# Patient Record
Sex: MALE | Race: WHITE | NOT HISPANIC OR LATINO | URBAN - METROPOLITAN AREA
[De-identification: names, ages, dates, MRNs, and addresses within clinical notes are randomized per-mention and may not be internally consistent; named-entity substitution may affect disease eponyms.]

---

## 2017-01-10 ENCOUNTER — HOSPITAL ENCOUNTER (OUTPATIENT)
Dept: LAB | Facility: MEDICAL CENTER | Age: 62
End: 2017-01-10
Payer: COMMERCIAL

## 2017-01-10 DIAGNOSIS — Z51.81 ENCOUNTER FOR THERAPEUTIC DRUG MONITORING: ICD-10-CM

## 2017-01-10 DIAGNOSIS — I25.10 CORONARY ARTERY DISEASE INVOLVING NATIVE CORONARY ARTERY OF NATIVE HEART WITHOUT ANGINA PECTORIS: ICD-10-CM

## 2017-01-10 DIAGNOSIS — E78.2 MIXED HYPERLIPIDEMIA: ICD-10-CM

## 2017-01-10 LAB
ALBUMIN SERPL BCP-MCNC: 4.5 G/DL (ref 3.2–4.9)
ALBUMIN/GLOB SERPL: 1.9 G/DL
ALP SERPL-CCNC: 50 U/L (ref 30–99)
ALT SERPL-CCNC: 34 U/L (ref 2–50)
ANION GAP SERPL CALC-SCNC: 5 MMOL/L (ref 0–11.9)
AST SERPL-CCNC: 34 U/L (ref 12–45)
BILIRUB SERPL-MCNC: 1.6 MG/DL (ref 0.1–1.5)
BUN SERPL-MCNC: 17 MG/DL (ref 8–22)
CALCIUM SERPL-MCNC: 9.5 MG/DL (ref 8.4–10.2)
CHLORIDE SERPL-SCNC: 108 MMOL/L (ref 96–112)
CHOLEST SERPL-MCNC: 108 MG/DL (ref 100–199)
CO2 SERPL-SCNC: 29 MMOL/L (ref 20–33)
CREAT SERPL-MCNC: 0.88 MG/DL (ref 0.5–1.4)
GFR SERPL CREATININE-BSD FRML MDRD: >60 ML/MIN/1.73 M 2
GLOBULIN SER CALC-MCNC: 2.4 G/DL (ref 1.9–3.5)
GLUCOSE SERPL-MCNC: 98 MG/DL (ref 65–99)
HDLC SERPL-MCNC: 45 MG/DL
LDLC SERPL CALC-MCNC: 28 MG/DL
POTASSIUM SERPL-SCNC: 4.7 MMOL/L (ref 3.6–5.5)
PROT SERPL-MCNC: 6.9 G/DL (ref 6–8.2)
SODIUM SERPL-SCNC: 142 MMOL/L (ref 135–145)
TRIGL SERPL-MCNC: 175 MG/DL (ref 0–149)

## 2017-01-10 PROCEDURE — 80061 LIPID PANEL: CPT

## 2017-01-10 PROCEDURE — 36415 COLL VENOUS BLD VENIPUNCTURE: CPT

## 2017-01-10 PROCEDURE — 80053 COMPREHEN METABOLIC PANEL: CPT

## 2017-01-12 ENCOUNTER — OFFICE VISIT (OUTPATIENT)
Dept: CARDIOLOGY | Facility: MEDICAL CENTER | Age: 62
End: 2017-01-12
Payer: COMMERCIAL

## 2017-01-12 VITALS
HEIGHT: 68 IN | HEART RATE: 60 BPM | WEIGHT: 188 LBS | DIASTOLIC BLOOD PRESSURE: 82 MMHG | OXYGEN SATURATION: 94 % | BODY MASS INDEX: 28.49 KG/M2 | SYSTOLIC BLOOD PRESSURE: 116 MMHG

## 2017-01-12 DIAGNOSIS — R00.2 PALPITATIONS: ICD-10-CM

## 2017-01-12 DIAGNOSIS — I25.10 CORONARY ARTERY DISEASE INVOLVING NATIVE CORONARY ARTERY OF NATIVE HEART WITHOUT ANGINA PECTORIS: ICD-10-CM

## 2017-01-12 DIAGNOSIS — E78.5 HYPERLIPIDEMIA, UNSPECIFIED HYPERLIPIDEMIA TYPE: ICD-10-CM

## 2017-01-12 DIAGNOSIS — I49.3 PREMATURE VENTRICULAR CONTRACTIONS: ICD-10-CM

## 2017-01-12 DIAGNOSIS — R00.2 HEART PALPITATIONS: ICD-10-CM

## 2017-01-12 DIAGNOSIS — E78.2 MIXED HYPERLIPIDEMIA: ICD-10-CM

## 2017-01-12 PROCEDURE — 99214 OFFICE O/P EST MOD 30 MIN: CPT | Performed by: NURSE PRACTITIONER

## 2017-01-12 RX ORDER — CLOPIDOGREL BISULFATE 75 MG/1
75 TABLET ORAL DAILY
Qty: 90 TAB | Refills: 3 | Status: SHIPPED | OUTPATIENT
Start: 2017-01-12 | End: 2018-01-19

## 2017-01-12 RX ORDER — ROSUVASTATIN CALCIUM 40 MG/1
40 TABLET, COATED ORAL
Qty: 90 TAB | Refills: 3 | Status: SHIPPED | OUTPATIENT
Start: 2017-01-12 | End: 2018-01-19 | Stop reason: SDUPTHER

## 2017-01-12 RX ORDER — EZETIMIBE 10 MG/1
10 TABLET ORAL
Qty: 90 TAB | Refills: 3 | Status: SHIPPED | OUTPATIENT
Start: 2017-01-12 | End: 2018-01-19 | Stop reason: SDUPTHER

## 2017-01-12 RX ORDER — METOPROLOL SUCCINATE 25 MG/1
25 TABLET, EXTENDED RELEASE ORAL DAILY
Qty: 90 TAB | Refills: 3 | Status: SHIPPED | OUTPATIENT
Start: 2017-01-12 | End: 2018-01-19 | Stop reason: SDUPTHER

## 2017-01-12 ASSESSMENT — ENCOUNTER SYMPTOMS
CLAUDICATION: 0
ABDOMINAL PAIN: 0
MYALGIAS: 0
SHORTNESS OF BREATH: 0
PND: 0
DIZZINESS: 0
PALPITATIONS: 0
ORTHOPNEA: 0
FEVER: 0
COUGH: 0

## 2017-01-12 NOTE — MR AVS SNAPSHOT
"        Sarabjit Almendarez   2017 9:20 AM   Office Visit   MRN: [id number]    Department:  [facility]   Dept Phone:  [phone]    Description:  Male : 1955   Provider:  SHARON Villafuerte           Reason for Visit     Follow-Up           Allergies as of 2017     No Known Allergies      You were diagnosed with     Coronary artery disease involving native coronary artery of native heart without angina pectoris   [5025778]       Mixed hyperlipidemia   [272.2.ICD-9-CM]       Heart palpitations   [290515]       Premature ventricular contractions   [928940]       Palpitations   [785.1.ICD-9-CM]       Hyperlipidemia, unspecified hyperlipidemia type   [9053933]         Vital Signs     Blood Pressure Pulse Height Weight Body Mass Index Oxygen Saturation    116/82 mmHg 60 1.727 m (5' 8\") 85.276 kg (188 lb) 28.59 kg/m2 94%    Smoking Status                   Never Smoker            Basic Information     Date Of Birth Sex Race Ethnicity Preferred Language    1955 Male White Non- English      Problem List              ICD-10-CM Priority Class Noted - Resolved    Coronary artery disease I25.10 Medium  Unknown - Present    Hyperlipidemia E78.5 Medium  Unknown - Present    Heart palpitations R00.2 Medium  Unknown - Present    Premature ventricular contractions I49.3 Medium  Unknown - Present      Health Maintenance        Date Due Completion Dates    IMM DTaP/Tdap/Td Vaccine (1 - Tdap) 1973 ---    COLONOSCOPY 2004 ---    IMM ZOSTER VACCINE 2014 ---    IMM INFLUENZA (1) 2016 ---            Current Immunizations     No immunizations on file.      Below and/or attached are the medications your provider expects you to take. Review all of your home medications and newly ordered medications with your provider and/or pharmacist. Follow medication instructions as directed by your provider and/or pharmacist. Please keep your medication list with you and share with " your provider. Update the information when medications are discontinued, doses are changed, or new medications (including over-the-counter products) are added; and carry medication information at all times in the event of emergency situations     Allergies:  No Known Allergies          Medications  Valid as of: January 12, 2017 - 10:04 AM    Generic Name Brand Name Tablet Size Instructions for use    Aspirin (Tablet Delayed Response) ECOTRIN 81 MG Take 81 mg by mouth every day.        Clopidogrel Bisulfate (Tab) PLAVIX 75 MG Take 1 Tab by mouth every day.        Ezetimibe (Tab) ZETIA 10 MG Take 1 Tab by mouth every bedtime.        Metoprolol Succinate (TABLET SR 24 HR) TOPROL XL 25 MG Take 1 Tab by mouth every day.        Rosuvastatin Calcium (Tab) CRESTOR 40 MG Take 1 Tab by mouth every bedtime.        .                 Medicines prescribed today were sent to:     [facility] [address] [place] [address], [place] - [id number] [facility] [place] [address]    Phone: [phone] Fax: [phone]    Open 24 Hours?: No      Medication refill instructions:       If your prescription bottle indicates you have medication refills left, it is not necessary to call your provider’s office. Please contact your pharmacy and they will refill your medication.    If your prescription bottle indicates you do not have any refills left, you may request refills at any time through one of the following ways: The online Xoopit system (except Urgent Care), by calling your provider’s office, or by asking your pharmacy to contact your provider’s office with a refill request. Medication refills are processed only during regular business hours and may not be available until the next business day. Your provider may request additional information or to have a follow-up visit with you prior to refilling your medication.   *Please Note: Medication refills are assigned a new Rx number when refilled electronically. Your pharmacy may indicate that  no refills were authorized even though a new prescription for the same medication is available at the pharmacy. Please request the medicine by name with the pharmacy before contacting your provider for a refill.           MyRealTriphart Access Code: Activation code not generated  Current Pickiet Status: Active

## 2017-01-12 NOTE — Clinical Note
[facility]   [address]  [place] [address]  Phone: [phone]  Fax: [phone]              Sarabjit Almendarez  1955    Encounter Date: 1/12/2017    SHARON Villafuerte          PROGRESS NOTE:  Subjective:   Sarabjit Almendarez is a 61 y.o. male who presents today for yearly follow up.    He is a patient of Dr. Treadwell in our office. Hx of HLD, HTN, PVC's, and CAD with previous CABG in '05.    He continues to exercise daily with no problems. He does the elliptical for 40-50 minutes daily.    He has had no episodes of chest pain, palpitations, dizziness/lightheadedness, shortness of breath, orthopnea, or peripheral edema.    Past Medical History   Diagnosis Date   • Hyperlipidemia    • CAD (coronary artery disease)      CABG    • Hypertension    • Palpitations      PVC's     Past Surgical History   Procedure Laterality Date   • Multiple coronary artery bypass     • Cardiac cath       Family History   Problem Relation Age of Onset   • Heart Disease Other      History   Smoking status   • Never Smoker    Smokeless tobacco   • Never Used     No Known Allergies  Outpatient Encounter Prescriptions as of 1/12/2017   Medication Sig Dispense Refill   • ezetimibe (ZETIA) 10 MG Tab Take 1 Tab by mouth every bedtime. 90 Tab 3   • metoprolol SR (TOPROL XL) 25 MG TABLET SR 24 HR Take 1 Tab by mouth every day. 90 Tab 3   • rosuvastatin (CRESTOR) 40 MG tablet Take 1 Tab by mouth every bedtime. 90 Tab 3   • clopidogrel (PLAVIX) 75 MG Tab Take 1 Tab by mouth every day. 90 Tab 3   • [DISCONTINUED] clopidogrel (PLAVIX) 75 MG Tab Take 1 Tab by mouth every day. 90 Tab 3   • [DISCONTINUED] ezetimibe (ZETIA) 10 MG Tab Take 1 Tab by mouth every bedtime. 90 Tab 3   • [DISCONTINUED] metoprolol SR (TOPROL XL) 25 MG TABLET SR 24 HR Take 1 Tab by mouth every day. 90 Tab 3   • [DISCONTINUED] rosuvastatin (CRESTOR) 40 MG tablet Take 1 Tab by mouth every bedtime. 90  "Tab 3   • aspirin EC (ECOTRIN) 81 MG TBEC Take 81 mg by mouth every day.       No facility-administered encounter medications on file as of 1/12/2017.     Review of Systems   Constitutional: Negative for fever and malaise/fatigue.   Respiratory: Negative for cough and shortness of breath.    Cardiovascular: Negative for chest pain, palpitations, orthopnea, claudication, leg swelling and PND.   Gastrointestinal: Negative for abdominal pain.   Musculoskeletal: Negative for myalgias.   Neurological: Negative for dizziness.   All other systems reviewed and are negative.       Objective:   /82 mmHg  Pulse 60  Ht 1.727 m (5' 8\")  Wt 85.276 kg (188 lb)  BMI 28.59 kg/m2  SpO2 94%    Physical Exam   Constitutional: He is oriented to person, place, and time. He appears well-developed and well-nourished. No distress.   HENT:   Head: Normocephalic and atraumatic.   Eyes: EOM are normal.   Neck: Normal range of motion. No JVD present.   Cardiovascular: Normal rate, regular rhythm, normal heart sounds and intact distal pulses.    No murmur heard.  Pulmonary/Chest: Effort normal and breath sounds normal. No respiratory distress. He has no wheezes. He has no rales.   Abdominal: Soft. Bowel sounds are normal.   Musculoskeletal: Normal range of motion. He exhibits no edema.   Neurological: He is alert and oriented to person, place, and time.   Skin: Skin is warm and dry.   Psychiatric: He has a normal mood and affect.   Nursing note and vitals reviewed.    Lab Results   Component Value Date/Time    CHOLESTEROL, 01/10/2017 10:06 AM    LDL 28 01/10/2017 10:06 AM    HDL 45 01/10/2017 10:06 AM    TRIGLYCERIDES 175* 01/10/2017 10:06 AM       Lab Results   Component Value Date/Time    SODIUM 142 01/10/2017 10:06 AM    POTASSIUM 4.7 01/10/2017 10:06 AM    CHLORIDE 108 01/10/2017 10:06 AM    CO2 29 01/10/2017 10:06 AM    GLUCOSE 98 01/10/2017 10:06 AM    BUN 17 01/10/2017 10:06 AM    CREATININE 0.88 01/10/2017 10:06 AM    " BUN-CREATININE RATIO 16 01/21/2015 08:52 AM     Lab Results   Component Value Date/Time    ALKALINE PHOSPHATASE 50 01/10/2017 10:06 AM    AST(SGOT) 34 01/10/2017 10:06 AM    ALT(SGPT) 34 01/10/2017 10:06 AM    TOTAL BILIRUBIN 1.6* 01/10/2017 10:06 AM      Assessment:     1. Coronary artery disease involving native coronary artery of native heart without angina pectoris  ezetimibe (ZETIA) 10 MG Tab    metoprolol SR (TOPROL XL) 25 MG TABLET SR 24 HR    rosuvastatin (CRESTOR) 40 MG tablet    clopidogrel (PLAVIX) 75 MG Tab   2. Mixed hyperlipidemia     3. Heart palpitations  ezetimibe (ZETIA) 10 MG Tab    metoprolol SR (TOPROL XL) 25 MG TABLET SR 24 HR    rosuvastatin (CRESTOR) 40 MG tablet    clopidogrel (PLAVIX) 75 MG Tab   4. Premature ventricular contractions     5. Palpitations  ezetimibe (ZETIA) 10 MG Tab    metoprolol SR (TOPROL XL) 25 MG TABLET SR 24 HR    rosuvastatin (CRESTOR) 40 MG tablet    clopidogrel (PLAVIX) 75 MG Tab   6. Hyperlipidemia, unspecified hyperlipidemia type  ezetimibe (ZETIA) 10 MG Tab    metoprolol SR (TOPROL XL) 25 MG TABLET SR 24 HR    rosuvastatin (CRESTOR) 40 MG tablet    clopidogrel (PLAVIX) 75 MG Tab     Medical Decision Making:  Today's Assessment / Status / Plan:     1. CAD with previous CABG in '05, no angina or ZARAGOZA. Continue ASA, plavix (consider discontinuation at next apt), metoprolol, and statin.    2. HLD, LDL at goal <70. Triglycerides a little elevated, recommend cutting back on sugars. Continue crestor and zetia.    3. Palpitations with PVC's, rare and improved with metoprolol.    4. HTN, great control on metoprolol only.    FU in clinic in 12 months with ST with yearly CMP and lipid; re-filled all med's for one year    Patient verbalizes understanding and agrees with the plan of care.     Collaborating MD: Addison SUAREZ    Spent 45 minutes in face-to-face patient contact in which greater than 50% of the visit was spent in counseling/coordination of care of medication  management, symptom management, re-assurance, discussion of coronary disease, medications and future plan of care.        No Recipients

## 2017-01-12 NOTE — PROGRESS NOTES
Subjective:   Sarabjit Almendarez is a 61 y.o. male who presents today for yearly follow up.    He is a patient of Dr. Treadwell in our office. Hx of HLD, HTN, PVC's, and CAD with previous CABG in '05.    He continues to exercise daily with no problems. He does the elliptical for 40-50 minutes daily.    He has had no episodes of chest pain, palpitations, dizziness/lightheadedness, shortness of breath, orthopnea, or peripheral edema.    Past Medical History   Diagnosis Date   • Hyperlipidemia    • CAD (coronary artery disease)      CABG    • Hypertension    • Palpitations      PVC's     Past Surgical History   Procedure Laterality Date   • Multiple coronary artery bypass     • Cardiac cath       Family History   Problem Relation Age of Onset   • Heart Disease Other      History   Smoking status   • Never Smoker    Smokeless tobacco   • Never Used     No Known Allergies  Outpatient Encounter Prescriptions as of 1/12/2017   Medication Sig Dispense Refill   • ezetimibe (ZETIA) 10 MG Tab Take 1 Tab by mouth every bedtime. 90 Tab 3   • metoprolol SR (TOPROL XL) 25 MG TABLET SR 24 HR Take 1 Tab by mouth every day. 90 Tab 3   • rosuvastatin (CRESTOR) 40 MG tablet Take 1 Tab by mouth every bedtime. 90 Tab 3   • clopidogrel (PLAVIX) 75 MG Tab Take 1 Tab by mouth every day. 90 Tab 3   • [DISCONTINUED] clopidogrel (PLAVIX) 75 MG Tab Take 1 Tab by mouth every day. 90 Tab 3   • [DISCONTINUED] ezetimibe (ZETIA) 10 MG Tab Take 1 Tab by mouth every bedtime. 90 Tab 3   • [DISCONTINUED] metoprolol SR (TOPROL XL) 25 MG TABLET SR 24 HR Take 1 Tab by mouth every day. 90 Tab 3   • [DISCONTINUED] rosuvastatin (CRESTOR) 40 MG tablet Take 1 Tab by mouth every bedtime. 90 Tab 3   • aspirin EC (ECOTRIN) 81 MG TBEC Take 81 mg by mouth every day.       No facility-administered encounter medications on file as of 1/12/2017.     Review of Systems   Constitutional: Negative for fever and malaise/fatigue.   Respiratory: Negative for cough and  "shortness of breath.    Cardiovascular: Negative for chest pain, palpitations, orthopnea, claudication, leg swelling and PND.   Gastrointestinal: Negative for abdominal pain.   Musculoskeletal: Negative for myalgias.   Neurological: Negative for dizziness.   All other systems reviewed and are negative.       Objective:   /82 mmHg  Pulse 60  Ht 1.727 m (5' 8\")  Wt 85.276 kg (188 lb)  BMI 28.59 kg/m2  SpO2 94%    Physical Exam   Constitutional: He is oriented to person, place, and time. He appears well-developed and well-nourished. No distress.   HENT:   Head: Normocephalic and atraumatic.   Eyes: EOM are normal.   Neck: Normal range of motion. No JVD present.   Cardiovascular: Normal rate, regular rhythm, normal heart sounds and intact distal pulses.    No murmur heard.  Pulmonary/Chest: Effort normal and breath sounds normal. No respiratory distress. He has no wheezes. He has no rales.   Abdominal: Soft. Bowel sounds are normal.   Musculoskeletal: Normal range of motion. He exhibits no edema.   Neurological: He is alert and oriented to person, place, and time.   Skin: Skin is warm and dry.   Psychiatric: He has a normal mood and affect.   Nursing note and vitals reviewed.    Lab Results   Component Value Date/Time    CHOLESTEROL, 01/10/2017 10:06 AM    LDL 28 01/10/2017 10:06 AM    HDL 45 01/10/2017 10:06 AM    TRIGLYCERIDES 175* 01/10/2017 10:06 AM       Lab Results   Component Value Date/Time    SODIUM 142 01/10/2017 10:06 AM    POTASSIUM 4.7 01/10/2017 10:06 AM    CHLORIDE 108 01/10/2017 10:06 AM    CO2 29 01/10/2017 10:06 AM    GLUCOSE 98 01/10/2017 10:06 AM    BUN 17 01/10/2017 10:06 AM    CREATININE 0.88 01/10/2017 10:06 AM    BUN-CREATININE RATIO 16 01/21/2015 08:52 AM     Lab Results   Component Value Date/Time    ALKALINE PHOSPHATASE 50 01/10/2017 10:06 AM    AST(SGOT) 34 01/10/2017 10:06 AM    ALT(SGPT) 34 01/10/2017 10:06 AM    TOTAL BILIRUBIN 1.6* 01/10/2017 10:06 AM      Assessment: "     1. Coronary artery disease involving native coronary artery of native heart without angina pectoris  ezetimibe (ZETIA) 10 MG Tab    metoprolol SR (TOPROL XL) 25 MG TABLET SR 24 HR    rosuvastatin (CRESTOR) 40 MG tablet    clopidogrel (PLAVIX) 75 MG Tab   2. Mixed hyperlipidemia     3. Heart palpitations  ezetimibe (ZETIA) 10 MG Tab    metoprolol SR (TOPROL XL) 25 MG TABLET SR 24 HR    rosuvastatin (CRESTOR) 40 MG tablet    clopidogrel (PLAVIX) 75 MG Tab   4. Premature ventricular contractions     5. Palpitations  ezetimibe (ZETIA) 10 MG Tab    metoprolol SR (TOPROL XL) 25 MG TABLET SR 24 HR    rosuvastatin (CRESTOR) 40 MG tablet    clopidogrel (PLAVIX) 75 MG Tab   6. Hyperlipidemia, unspecified hyperlipidemia type  ezetimibe (ZETIA) 10 MG Tab    metoprolol SR (TOPROL XL) 25 MG TABLET SR 24 HR    rosuvastatin (CRESTOR) 40 MG tablet    clopidogrel (PLAVIX) 75 MG Tab     Medical Decision Making:  Today's Assessment / Status / Plan:     1. CAD with previous CABG in '05, no angina or ZARAGOZA. Continue ASA, plavix (consider discontinuation at next apt), metoprolol, and statin.    2. HLD, LDL at goal <70. Triglycerides a little elevated, recommend cutting back on sugars. Continue crestor and zetia.    3. Palpitations with PVC's, rare and improved with metoprolol.    4. HTN, great control on metoprolol only.    FU in clinic in 12 months with ST with yearly CMP and lipid; re-filled all med's for one year    Patient verbalizes understanding and agrees with the plan of care.     Collaborating MD: Addison SUAREZ    Spent 45 minutes in face-to-face patient contact in which greater than 50% of the visit was spent in counseling/coordination of care of medication management, symptom management, re-assurance, discussion of coronary disease, medications and future plan of care.

## 2017-03-31 ENCOUNTER — TELEPHONE (OUTPATIENT)
Dept: CARDIOLOGY | Facility: MEDICAL CENTER | Age: 62
End: 2017-03-31

## 2017-03-31 NOTE — TELEPHONE ENCOUNTER
Message  Received: 3 days ago       Fiorella Dasilva, Med Ass't  Chen Rosales R.N.       Caller: Unspecified (3 days ago, 1:30 PM)                     i will send in PA   Thank you         ----------------------------------------------------------------------------------------  [facility] has denied coverage of Zetia.     To Fiorella to initiate a PA.

## 2017-10-18 ENCOUNTER — TELEPHONE (OUTPATIENT)
Dept: CARDIOLOGY | Facility: MEDICAL CENTER | Age: 62
End: 2017-10-18

## 2017-10-18 DIAGNOSIS — Z95.1 HX OF CABG: ICD-10-CM

## 2017-10-18 DIAGNOSIS — R07.89 CHEST DISCOMFORT: ICD-10-CM

## 2017-10-18 NOTE — TELEPHONE ENCOUNTER
"Pt explains he was out walking on the golf [place] this weekend and had \"discomfort\" in his chest but not pain he says. Discomfort lasted for the rest of the day until after the round. He was walking the tournament the next day and again had same feeling but went away with rest. He denies any other symptoms of SOB, weakness ect currently or during the activity.    To SC       ----- Message from Tamera Coburn sent at 10/18/2017 11:36 AM PDT -----  Regarding: tightening of chest  Contact: [phone]  SC/israel    Pt calling to discuss episodes over the w/e which included chest discomfort, a feeling of tightening that lasted during exertion.  Symptoms are gone at this time.  Please call pt at [phone].    "

## 2017-10-18 NOTE — TELEPHONE ENCOUNTER
Get him in for treadmill stress soon. If pain persists or worsens to go to ER for eval and EKG. Has his BP been okay at home? SC

## 2017-10-18 NOTE — TELEPHONE ENCOUNTER
Pt notified and treadmill stress test ordered. Ordered handed to scheduling to arrange in first available opening. Pt has not checked his BP lately. He will monitor this. Pt advised to take it easy and to call EMS if symptoms worsen.

## 2017-10-31 ENCOUNTER — NON-PROVIDER VISIT (OUTPATIENT)
Dept: CARDIOLOGY | Facility: MEDICAL CENTER | Age: 62
End: 2017-10-31
Payer: COMMERCIAL

## 2017-10-31 VITALS
HEIGHT: 68 IN | OXYGEN SATURATION: 93 % | HEART RATE: 72 BPM | WEIGHT: 188 LBS | SYSTOLIC BLOOD PRESSURE: 122 MMHG | DIASTOLIC BLOOD PRESSURE: 70 MMHG | BODY MASS INDEX: 28.49 KG/M2

## 2017-10-31 DIAGNOSIS — I25.10 CORONARY ARTERY DISEASE INVOLVING NATIVE CORONARY ARTERY OF NATIVE HEART WITHOUT ANGINA PECTORIS: ICD-10-CM

## 2017-10-31 LAB — TREADMILL STRESS: NORMAL

## 2017-10-31 PROCEDURE — 93015 CV STRESS TEST SUPVJ I&R: CPT | Performed by: INTERNAL MEDICINE

## 2017-11-06 ENCOUNTER — TELEPHONE (OUTPATIENT)
Dept: CARDIOLOGY | Facility: MEDICAL CENTER | Age: 62
End: 2017-11-06

## 2017-11-06 NOTE — TELEPHONE ENCOUNTER
L/m notifying pt of Stress test results. Educated him to please call office to discuss more in detail or if he has any questions.     ----- Message -----  From: SHARON Villafuerte  Sent: 10/31/2017   4:17 PM  To: Chen Donald R.N.    Good news. Treadmill stress looked good. No signs of ischemia. Some rare irregular heart beats noted. If these are bothersome, we can increase his metoprolol if BP allows. SC

## 2018-01-19 ENCOUNTER — OFFICE VISIT (OUTPATIENT)
Dept: CARDIOLOGY | Facility: MEDICAL CENTER | Age: 63
End: 2018-01-19
Payer: COMMERCIAL

## 2018-01-19 VITALS
DIASTOLIC BLOOD PRESSURE: 76 MMHG | SYSTOLIC BLOOD PRESSURE: 102 MMHG | WEIGHT: 190 LBS | OXYGEN SATURATION: 95 % | HEART RATE: 75 BPM | BODY MASS INDEX: 28.79 KG/M2 | HEIGHT: 68 IN

## 2018-01-19 DIAGNOSIS — E78.5 HYPERLIPIDEMIA, UNSPECIFIED HYPERLIPIDEMIA TYPE: ICD-10-CM

## 2018-01-19 DIAGNOSIS — R00.2 HEART PALPITATIONS: ICD-10-CM

## 2018-01-19 DIAGNOSIS — I25.10 CORONARY ARTERY DISEASE INVOLVING NATIVE CORONARY ARTERY OF NATIVE HEART WITHOUT ANGINA PECTORIS: ICD-10-CM

## 2018-01-19 DIAGNOSIS — E78.2 MIXED HYPERLIPIDEMIA: ICD-10-CM

## 2018-01-19 DIAGNOSIS — R00.2 PALPITATIONS: ICD-10-CM

## 2018-01-19 DIAGNOSIS — I49.3 PREMATURE VENTRICULAR CONTRACTIONS: ICD-10-CM

## 2018-01-19 PROCEDURE — 99214 OFFICE O/P EST MOD 30 MIN: CPT | Performed by: NURSE PRACTITIONER

## 2018-01-19 RX ORDER — ROSUVASTATIN CALCIUM 40 MG/1
40 TABLET, COATED ORAL
Qty: 90 TAB | Refills: 3 | Status: SHIPPED | OUTPATIENT
Start: 2018-01-19 | End: 2019-02-05 | Stop reason: SDUPTHER

## 2018-01-19 RX ORDER — METOPROLOL SUCCINATE 25 MG/1
12.5 TABLET, EXTENDED RELEASE ORAL DAILY
Qty: 45 TAB | Refills: 3 | Status: SHIPPED | OUTPATIENT
Start: 2018-01-19 | End: 2019-02-05 | Stop reason: SDUPTHER

## 2018-01-19 RX ORDER — EZETIMIBE 10 MG/1
10 TABLET ORAL
Qty: 90 TAB | Refills: 3 | Status: SHIPPED | OUTPATIENT
Start: 2018-01-19 | End: 2019-02-05 | Stop reason: SDUPTHER

## 2018-01-19 ASSESSMENT — ENCOUNTER SYMPTOMS
PALPITATIONS: 0
PND: 0
COUGH: 0
ABDOMINAL PAIN: 0
SHORTNESS OF BREATH: 0
MYALGIAS: 0
DIZZINESS: 0
ORTHOPNEA: 0
FEVER: 0
CLAUDICATION: 0

## 2018-01-19 NOTE — PROGRESS NOTES
Subjective:   Sarabjit Almendarez is a 61 y.o. male who presents today for yearly follow up.    He is a previous patient of Dr. Treadwell in our office, we will set him up with Dr. Vu in our office. Hx of HLD, HTN, PVC's, and CAD with previous CABG in '05.    He continues to exercise daily with no problems. 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 He golfs regularly and exercises daily with no exertional symptoms.    He has had no episodes of chest pain, palpitations, dizziness/lightheadedness, shortness of breath, orthopnea, or peripheral edema.    Past Medical History:   Diagnosis Date   • CAD (coronary artery disease)     CABG    • Hyperlipidemia    • Hypertension    • Palpitations     PVC's     Past Surgical History:   Procedure Laterality Date   • CARDIAC CATH     • MULTIPLE CORONARY ARTERY BYPASS       Family History   Problem Relation Age of Onset   • Heart Disease Other      History   Smoking Status   • Never Smoker   Smokeless Tobacco   • Never Used     No Known Allergies  Outpatient Encounter Prescriptions as of 1/19/2018   Medication Sig Dispense Refill   • metoprolol SR (TOPROL XL) 25 MG TABLET SR 24 HR Take 0.5 Tabs by mouth every day. 45 Tab 3   • ezetimibe (ZETIA) 10 MG Tab Take 1 Tab by mouth every bedtime. 90 Tab 3   • rosuvastatin (CRESTOR) 40 MG tablet Take 1 Tab by mouth every bedtime. 90 Tab 3   • aspirin EC (ECOTRIN) 81 MG TBEC Take 81 mg by mouth every day.     • [DISCONTINUED] ezetimibe (ZETIA) 10 MG Tab Take 1 Tab by mouth every bedtime. 90 Tab 3   • [DISCONTINUED] metoprolol SR (TOPROL XL) 25 MG TABLET SR 24 HR Take 1 Tab by mouth every day. 90 Tab 3   • [DISCONTINUED] rosuvastatin (CRESTOR) 40 MG tablet Take 1 Tab by mouth every bedtime. 90 Tab 3   • [DISCONTINUED] clopidogrel (PLAVIX) 75 MG Tab Take 1 Tab by mouth every day. 90 Tab 3     No facility-administered encounter medications on file as of 1/19/2018.      Review of Systems   Constitutional: Negative for fever and  "malaise/fatigue.   Respiratory: Negative for cough and shortness of breath.    Cardiovascular: Negative for chest pain, palpitations, orthopnea, claudication, leg swelling and PND.   Gastrointestinal: Negative for abdominal pain.   Musculoskeletal: Negative for myalgias.   Neurological: Negative for dizziness.   All other systems reviewed and are negative.       Objective:   /76   Pulse 75   Ht 1.727 m (5' 8\")   Wt 86.2 kg (190 lb)   SpO2 95%   BMI 28.89 kg/m²     Physical Exam   Constitutional: He is oriented to person, place, and time. He appears well-developed and well-nourished. No distress.   HENT:   Head: Normocephalic and atraumatic.   Eyes: EOM are normal.   Neck: Normal range of motion. No JVD present.   Cardiovascular: Normal rate, regular rhythm, normal heart sounds and intact distal pulses.    No murmur heard.  Pulmonary/Chest: Effort normal and breath sounds normal. No respiratory distress. He has no wheezes. He has no rales.   Abdominal: Soft. Bowel sounds are normal.   Musculoskeletal: Normal range of motion. He exhibits no edema.   Neurological: He is alert and oriented to person, place, and time.   Skin: Skin is warm and dry.   Psychiatric: He has a normal mood and affect.   Nursing note and vitals reviewed.    Lab Results   Component Value Date/Time    CHOLSTRLTOT 108 01/10/2017 10:06 AM    LDL 28 01/10/2017 10:06 AM    HDL 45 01/10/2017 10:06 AM    TRIGLYCERIDE 175 (H) 01/10/2017 10:06 AM       Lab Results   Component Value Date/Time    SODIUM 142 01/10/2017 10:06 AM    POTASSIUM 4.7 01/10/2017 10:06 AM    CHLORIDE 108 01/10/2017 10:06 AM    CO2 29 01/10/2017 10:06 AM    GLUCOSE 98 01/10/2017 10:06 AM    BUN 17 01/10/2017 10:06 AM    CREATININE 0.88 01/10/2017 10:06 AM    CREATININE 1.0 08/07/2006 10:20 AM    BUNCREATRAT 16 01/21/2015 08:52 AM     Lab Results   Component Value Date/Time    ALKPHOSPHAT 50 01/10/2017 10:06 AM    ASTSGOT 34 01/10/2017 10:06 AM    ALTSGPT 34 01/10/2017 " 10:06 AM    MARK ANTHONYBIN 1.6 (H) 01/10/2017 10:06 AM      Assessment:     1. Coronary artery disease involving native coronary artery of native heart without angina pectoris  metoprolol SR (TOPROL XL) 25 MG TABLET SR 24 HR    ezetimibe (ZETIA) 10 MG Tab    rosuvastatin (CRESTOR) 40 MG tablet    COMP METABOLIC PANEL    LIPID PANEL   2. Heart palpitations  metoprolol SR (TOPROL XL) 25 MG TABLET SR 24 HR    ezetimibe (ZETIA) 10 MG Tab    rosuvastatin (CRESTOR) 40 MG tablet   3. Mixed hyperlipidemia  COMP METABOLIC PANEL    LIPID PANEL   4. Premature ventricular contractions     5. Palpitations  metoprolol SR (TOPROL XL) 25 MG TABLET SR 24 HR    ezetimibe (ZETIA) 10 MG Tab    rosuvastatin (CRESTOR) 40 MG tablet   6. Hyperlipidemia, unspecified hyperlipidemia type  metoprolol SR (TOPROL XL) 25 MG TABLET SR 24 HR    ezetimibe (ZETIA) 10 MG Tab    rosuvastatin (CRESTOR) 40 MG tablet     Medical Decision Making:  Today's Assessment / Status / Plan:     1. CAD with previous CABG in '05, no angina or ZARAGOZA. Continue ASA, metoprolol, and statin. Stop plavix.    2. HLD, LDL at goal <70. Triglycerides were elevated last year, recommend cutting back on sugars/ETOH/carbs. Continue crestor and zetia.    3. Palpitations with PVC's, rare and improved with metoprolol.    4. HTN, great control on metoprolol only. Okay to cut back metoprolol to 12.5 mg QD, if PVC's bother him more, go back on 25 mg dose.    FU in clinic in 12 months with [facility] with yearly CMP and lipid; re-filled all med's for one year    Patient verbalizes understanding and agrees with the plan of care.     Collaborating MD: Addison SUAREZ

## 2018-01-19 NOTE — LETTER
[facility]   [address]  [place] [address]  Phone: [phone]  Fax: [phone]              Sarabjit Almendarez  1955    Encounter Date: 1/19/2018    SHARON Villafuerte          PROGRESS NOTE:  Subjective:   Sarabjit Almendarez is a 61 y.o. male who presents today for yearly follow up.    He is a previous patient of Dr. Treadwell in our office, we will set him up with Dr. Vu in our office. Hx of HLD, HTN, PVC's, and CAD with previous CABG in '05.    He continues to exercise daily with no problems. 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 He golfs regularly and exercises daily with no exertional symptoms.    He has had no episodes of chest pain, palpitations, dizziness/lightheadedness, shortness of breath, orthopnea, or peripheral edema.    Past Medical History:   Diagnosis Date   • CAD (coronary artery disease)     CABG    • Hyperlipidemia    • Hypertension    • Palpitations     PVC's     Past Surgical History:   Procedure Laterality Date   • CARDIAC CATH     • MULTIPLE CORONARY ARTERY BYPASS       Family History   Problem Relation Age of Onset   • Heart Disease Other      History   Smoking Status   • Never Smoker   Smokeless Tobacco   • Never Used     No Known Allergies  Outpatient Encounter Prescriptions as of 1/19/2018   Medication Sig Dispense Refill   • metoprolol SR (TOPROL XL) 25 MG TABLET SR 24 HR Take 0.5 Tabs by mouth every day. 45 Tab 3   • ezetimibe (ZETIA) 10 MG Tab Take 1 Tab by mouth every bedtime. 90 Tab 3   • rosuvastatin (CRESTOR) 40 MG tablet Take 1 Tab by mouth every bedtime. 90 Tab 3   • aspirin EC (ECOTRIN) 81 MG TBEC Take 81 mg by mouth every day.     • [DISCONTINUED] ezetimibe (ZETIA) 10 MG Tab Take 1 Tab by mouth every bedtime. 90 Tab 3   • [DISCONTINUED] metoprolol SR (TOPROL XL) 25 MG TABLET SR 24 HR Take 1 Tab by mouth every day. 90 Tab 3   • [DISCONTINUED] rosuvastatin (CRESTOR) 40 MG tablet Take 1  "Tab by mouth every bedtime. 90 Tab 3   • [DISCONTINUED] clopidogrel (PLAVIX) 75 MG Tab Take 1 Tab by mouth every day. 90 Tab 3     No facility-administered encounter medications on file as of 1/19/2018.      Review of Systems   Constitutional: Negative for fever and malaise/fatigue.   Respiratory: Negative for cough and shortness of breath.    Cardiovascular: Negative for chest pain, palpitations, orthopnea, claudication, leg swelling and PND.   Gastrointestinal: Negative for abdominal pain.   Musculoskeletal: Negative for myalgias.   Neurological: Negative for dizziness.   All other systems reviewed and are negative.       Objective:   /76   Pulse 75   Ht 1.727 m (5' 8\")   Wt 86.2 kg (190 lb)   SpO2 95%   BMI 28.89 kg/m²      Physical Exam   Constitutional: He is oriented to person, place, and time. He appears well-developed and well-nourished. No distress.   HENT:   Head: Normocephalic and atraumatic.   Eyes: EOM are normal.   Neck: Normal range of motion. No JVD present.   Cardiovascular: Normal rate, regular rhythm, normal heart sounds and intact distal pulses.    No murmur heard.  Pulmonary/Chest: Effort normal and breath sounds normal. No respiratory distress. He has no wheezes. He has no rales.   Abdominal: Soft. Bowel sounds are normal.   Musculoskeletal: Normal range of motion. He exhibits no edema.   Neurological: He is alert and oriented to person, place, and time.   Skin: Skin is warm and dry.   Psychiatric: He has a normal mood and affect.   Nursing note and vitals reviewed.    Lab Results   Component Value Date/Time    CHOLSTRLTOT 108 01/10/2017 10:06 AM    LDL 28 01/10/2017 10:06 AM    HDL 45 01/10/2017 10:06 AM    TRIGLYCERIDE 175 (H) 01/10/2017 10:06 AM       Lab Results   Component Value Date/Time    SODIUM 142 01/10/2017 10:06 AM    POTASSIUM 4.7 01/10/2017 10:06 AM    CHLORIDE 108 01/10/2017 10:06 AM    CO2 29 01/10/2017 10:06 AM    GLUCOSE 98 01/10/2017 10:06 AM    BUN 17 01/10/2017 " 10:06 AM    CREATININE 0.88 01/10/2017 10:06 AM    CREATININE 1.0 08/07/2006 10:20 AM    BUNCREATRAT 16 01/21/2015 08:52 AM     Lab Results   Component Value Date/Time    ALKPHOSPHAT 50 01/10/2017 10:06 AM    ASTSGOT 34 01/10/2017 10:06 AM    ALTSGPT 34 01/10/2017 10:06 AM    TBILIRUBIN 1.6 (H) 01/10/2017 10:06 AM      Assessment:     1. Coronary artery disease involving native coronary artery of native heart without angina pectoris  metoprolol SR (TOPROL XL) 25 MG TABLET SR 24 HR    ezetimibe (ZETIA) 10 MG Tab    rosuvastatin (CRESTOR) 40 MG tablet    COMP METABOLIC PANEL    LIPID PANEL   2. Heart palpitations  metoprolol SR (TOPROL XL) 25 MG TABLET SR 24 HR    ezetimibe (ZETIA) 10 MG Tab    rosuvastatin (CRESTOR) 40 MG tablet   3. Mixed hyperlipidemia  COMP METABOLIC PANEL    LIPID PANEL   4. Premature ventricular contractions     5. Palpitations  metoprolol SR (TOPROL XL) 25 MG TABLET SR 24 HR    ezetimibe (ZETIA) 10 MG Tab    rosuvastatin (CRESTOR) 40 MG tablet   6. Hyperlipidemia, unspecified hyperlipidemia type  metoprolol SR (TOPROL XL) 25 MG TABLET SR 24 HR    ezetimibe (ZETIA) 10 MG Tab    rosuvastatin (CRESTOR) 40 MG tablet     Medical Decision Making:  Today's Assessment / Status / Plan:     1. CAD with previous CABG in '05, no angina or ZARAGOZA. Continue ASA, metoprolol, and statin. Stop plavix.    2. HLD, LDL at goal <70. Triglycerides were elevated last year, recommend cutting back on sugars/ETOH/carbs. Continue crestor and zetia.    3. Palpitations with PVC's, rare and improved with metoprolol.    4. HTN, great control on metoprolol only. Okay to cut back metoprolol to 12.5 mg QD, if PVC's bother him more, go back on 25 mg dose.    FU in clinic in 12 months with SIGIFREDO with yearly CMP and lipid; re-filled all med's for one year    Patient verbalizes understanding and agrees with the plan of care.     Collaborating MD: Addison SUAREZ        No Recipients

## 2018-01-26 ENCOUNTER — HOSPITAL ENCOUNTER (OUTPATIENT)
Dept: LAB | Facility: MEDICAL CENTER | Age: 63
End: 2018-01-26
Attending: NURSE PRACTITIONER
Payer: COMMERCIAL

## 2018-01-26 DIAGNOSIS — E78.2 MIXED HYPERLIPIDEMIA: ICD-10-CM

## 2018-01-26 DIAGNOSIS — I25.10 CORONARY ARTERY DISEASE INVOLVING NATIVE CORONARY ARTERY OF NATIVE HEART WITHOUT ANGINA PECTORIS: ICD-10-CM

## 2018-01-26 LAB
ALBUMIN SERPL BCP-MCNC: 4.6 G/DL (ref 3.2–4.9)
ALBUMIN/GLOB SERPL: 2.2 G/DL
ALP SERPL-CCNC: 50 U/L (ref 30–99)
ALT SERPL-CCNC: 24 U/L (ref 2–50)
ANION GAP SERPL CALC-SCNC: 5 MMOL/L (ref 0–11.9)
AST SERPL-CCNC: 24 U/L (ref 12–45)
BILIRUB SERPL-MCNC: 1.4 MG/DL (ref 0.1–1.5)
BUN SERPL-MCNC: 24 MG/DL (ref 8–22)
CALCIUM SERPL-MCNC: 9.3 MG/DL (ref 8.5–10.5)
CHLORIDE SERPL-SCNC: 108 MMOL/L (ref 96–112)
CHOLEST SERPL-MCNC: 106 MG/DL (ref 100–199)
CO2 SERPL-SCNC: 29 MMOL/L (ref 20–33)
CREAT SERPL-MCNC: 0.97 MG/DL (ref 0.5–1.4)
GLOBULIN SER CALC-MCNC: 2.1 G/DL (ref 1.9–3.5)
GLUCOSE SERPL-MCNC: 89 MG/DL (ref 65–99)
HDLC SERPL-MCNC: 42 MG/DL
LDLC SERPL CALC-MCNC: 30 MG/DL
POTASSIUM SERPL-SCNC: 4.8 MMOL/L (ref 3.6–5.5)
PROT SERPL-MCNC: 6.7 G/DL (ref 6–8.2)
SODIUM SERPL-SCNC: 142 MMOL/L (ref 135–145)
TRIGL SERPL-MCNC: 168 MG/DL (ref 0–149)

## 2018-01-26 PROCEDURE — 80053 COMPREHEN METABOLIC PANEL: CPT

## 2018-01-26 PROCEDURE — 36415 COLL VENOUS BLD VENIPUNCTURE: CPT

## 2018-01-26 PROCEDURE — 80061 LIPID PANEL: CPT

## 2018-01-29 ENCOUNTER — TELEPHONE (OUTPATIENT)
Dept: CARDIOLOGY | Facility: MEDICAL CENTER | Age: 63
End: 2018-01-29

## 2018-01-29 NOTE — TELEPHONE ENCOUNTER
L/m notifying pt of CMP and Lipid results per Fariba GUSTAFSON. Educated pt to please call back with any questions or concerns.     ----- Message -----  From: TAMANNA VillafuerteRJimN.  Sent: 1/29/2018   8:18 AM  To: Chen Donald R.N.    CMP looks good just mild BUN elevation-could just be a little dehydration. Encourage H20. Lipid panel shows good LDL control with CAD, but triglycerides a little elevated-cut back on ETOH,carbs, sweets. FU as planned. SC

## 2018-11-26 ENCOUNTER — OFFICE VISIT (OUTPATIENT)
Dept: CARDIOLOGY | Facility: MEDICAL CENTER | Age: 63
End: 2018-11-26
Payer: COMMERCIAL

## 2018-11-26 VITALS
DIASTOLIC BLOOD PRESSURE: 80 MMHG | WEIGHT: 183.2 LBS | HEIGHT: 68 IN | HEART RATE: 74 BPM | BODY MASS INDEX: 27.77 KG/M2 | SYSTOLIC BLOOD PRESSURE: 122 MMHG | OXYGEN SATURATION: 92 %

## 2018-11-26 DIAGNOSIS — I10 ESSENTIAL HYPERTENSION: ICD-10-CM

## 2018-11-26 DIAGNOSIS — I49.3 PREMATURE VENTRICULAR CONTRACTIONS: ICD-10-CM

## 2018-11-26 DIAGNOSIS — E78.2 MIXED HYPERLIPIDEMIA: ICD-10-CM

## 2018-11-26 DIAGNOSIS — I25.10 CORONARY ARTERY DISEASE INVOLVING NATIVE CORONARY ARTERY OF NATIVE HEART WITHOUT ANGINA PECTORIS: ICD-10-CM

## 2018-11-26 DIAGNOSIS — R01.1 SYSTOLIC MURMUR: ICD-10-CM

## 2018-11-26 PROCEDURE — 99214 OFFICE O/P EST MOD 30 MIN: CPT | Performed by: INTERNAL MEDICINE

## 2018-11-26 ASSESSMENT — ENCOUNTER SYMPTOMS
DIZZINESS: 0
ABDOMINAL PAIN: 0
PALPITATIONS: 0
ORTHOPNEA: 0
LOSS OF CONSCIOUSNESS: 0
DEPRESSION: 0
PND: 0
SHORTNESS OF BREATH: 0
FALLS: 0

## 2018-11-26 NOTE — LETTER
[facility]   [address]  [place] [address]  Phone: [phone]  Fax: [phone]              Sarabjit Almendarez  1955    Encounter Date: 11/26/2018    Margarita Escamilla M.D.          PROGRESS NOTE:  Chief Complaint   Patient presents with   • Coronary Artery Disease     x10mon. f/v   • Palpitations   • Hyperlipidemia       Subjective:   Sarabjit Almendarez is a 62 y.o. male who presents today to follow-up on his coronary artery disease.    Pertinent history:  Coronary artery disease status post CABG in 2005  Hypertension  Hyperlipidemia  Symptomatic PVCs    Patient is a  at [employer].  He walks regularly.  Denies any anginal symptoms.  Continues to have palpitations due to his PVCs.  Symptoms have been stable since his CABG.  No associated dizziness.  He is currently on rosuvastatin for his hyperlipidemia which is tolerating well.    Past Medical History:   Diagnosis Date   • CAD (coronary artery disease)     CABG    • Hyperlipidemia    • Hypertension    • Palpitations     PVC's     Past Surgical History:   Procedure Laterality Date   • CARDIAC CATH     • MULTIPLE CORONARY ARTERY BYPASS       Family History   Problem Relation Age of Onset   • Heart Disease Other      Social History     Social History   • Marital status:      Spouse name: N/A   • Number of children: N/A   • Years of education: N/A     Occupational History   • Not on file.     Social History Main Topics   • Smoking status: Never Smoker   • Smokeless tobacco: Never Used   • Alcohol use No   • Drug use: Unknown   • Sexual activity: Not on file     Other Topics Concern   • Not on file     Social History Narrative   • No narrative on file     No Known Allergies  Outpatient Encounter Prescriptions as of 11/26/2018   Medication Sig Dispense Refill   • metoprolol SR (TOPROL XL) 25 MG TABLET SR 24 HR Take 0.5 Tabs by mouth every day. 45 Tab 3   • ezetimibe (ZETIA) 10 MG Tab Take 1  "Tab by mouth every bedtime. 90 Tab 3   • rosuvastatin (CRESTOR) 40 MG tablet Take 1 Tab by mouth every bedtime. 90 Tab 3   • aspirin EC (ECOTRIN) 81 MG TBEC Take 81 mg by mouth every day.       No facility-administered encounter medications on file as of 11/26/2018.      Review of Systems   Constitutional: Negative for malaise/fatigue.   Respiratory: Negative for shortness of breath.    Cardiovascular: Negative for chest pain, palpitations, orthopnea, leg swelling and PND.   Gastrointestinal: Negative for abdominal pain.   Musculoskeletal: Negative for falls.   Neurological: Negative for dizziness and loss of consciousness.   Psychiatric/Behavioral: Negative for depression.   All other systems reviewed and are negative.       Objective:   /80 (BP Location: Left arm, Patient Position: Sitting, BP Cuff Size: Adult)   Pulse 74   Ht 1.727 m (5' 8\")   Wt 83.1 kg (183 lb 3.2 oz)   SpO2 92%   BMI 27.86 kg/m²      Physical Exam   Constitutional: He is oriented to person, place, and time. He appears well-developed and well-nourished. No distress.   HENT:   Head: Normocephalic and atraumatic.   Eyes: Conjunctivae are normal.   Neck: Normal range of motion. Neck supple.   Cardiovascular: Normal rate and regular rhythm.  Exam reveals no gallop and no friction rub.    Murmur heard.   Crescendo decrescendo systolic murmur is present with a grade of 2/6   Pulmonary/Chest: Effort normal and breath sounds normal. No respiratory distress. He has no wheezes. He has no rales.   Abdominal: Soft. He exhibits no distension. There is no tenderness.   Musculoskeletal: He exhibits no edema.   Neurological: He is alert and oriented to person, place, and time.   Skin: Skin is warm and dry. He is not diaphoretic.   Psychiatric: He has a normal mood and affect. His behavior is normal.   Nursing note and vitals reviewed.    Assessment:     1. Coronary artery disease involving native coronary artery of native heart without angina " pectoris     2. Mixed hyperlipidemia  Lipid Profile   3. Essential hypertension  BASIC METABOLIC PANEL   4. Premature ventricular contractions     5. Systolic murmur  EC-ECHOCARDIOGRAM COMPLETE W/O CONT       Medical Decision Making:  Today's Assessment / Status / Plan:     Systolic murmur: New issue for the patient.  He will be referred for an echocardiogram for further evaluation.    Coronary artery disease: Status post CABG.  No anginal symptoms.  Continue aspirin and statin.    Hyperlipidemia:  Continue Crestor and Zetia at current dose.  Lipid panel ordered.    Symptomatic PVCs: Patient has been asymptomatic.  Continue metoprolol at current dose.    Return to clinic in 1 year or earlier if needed.    Thank you for allowing me to participate in the care of this patient. Please do not hesitate to contact me with any questions.    Margarita Escamilla MD  Cardiologist  [facility]      PLEASE NOTE: This dictation was created using voice recognition software.         Juanito Hardwick M.D.  [address]  [place] [id number]  VIA Facsimile: [phone]

## 2018-11-26 NOTE — PROGRESS NOTES
Chief Complaint   Patient presents with   • Coronary Artery Disease     x10mon. f/v   • Palpitations   • Hyperlipidemia       Subjective:   Sarabjit Almendarez is a 62 y.o. male who presents today to follow-up on his coronary artery disease.    Pertinent history:  Coronary artery disease status post CABG in 2005  Hypertension  Hyperlipidemia  Symptomatic PVCs    Patient is a  at [employer].  He walks regularly.  Denies any anginal symptoms.  Continues to have palpitations due to his PVCs.  Symptoms have been stable since his CABG.  No associated dizziness.  He is currently on rosuvastatin for his hyperlipidemia which is tolerating well.    Past Medical History:   Diagnosis Date   • CAD (coronary artery disease)     CABG    • Hyperlipidemia    • Hypertension    • Palpitations     PVC's     Past Surgical History:   Procedure Laterality Date   • CARDIAC CATH     • MULTIPLE CORONARY ARTERY BYPASS       Family History   Problem Relation Age of Onset   • Heart Disease Other      Social History     Social History   • Marital status:      Spouse name: N/A   • Number of children: N/A   • Years of education: N/A     Occupational History   • Not on file.     Social History Main Topics   • Smoking status: Never Smoker   • Smokeless tobacco: Never Used   • Alcohol use No   • Drug use: Unknown   • Sexual activity: Not on file     Other Topics Concern   • Not on file     Social History Narrative   • No narrative on file     No Known Allergies  Outpatient Encounter Prescriptions as of 11/26/2018   Medication Sig Dispense Refill   • metoprolol SR (TOPROL XL) 25 MG TABLET SR 24 HR Take 0.5 Tabs by mouth every day. 45 Tab 3   • ezetimibe (ZETIA) 10 MG Tab Take 1 Tab by mouth every bedtime. 90 Tab 3   • rosuvastatin (CRESTOR) 40 MG tablet Take 1 Tab by mouth every bedtime. 90 Tab 3   • aspirin EC (ECOTRIN) 81 MG TBEC Take 81 mg by mouth every day.       No facility-administered encounter medications on file as of  "11/26/2018.      Review of Systems   Constitutional: Negative for malaise/fatigue.   Respiratory: Negative for shortness of breath.    Cardiovascular: Negative for chest pain, palpitations, orthopnea, leg swelling and PND.   Gastrointestinal: Negative for abdominal pain.   Musculoskeletal: Negative for falls.   Neurological: Negative for dizziness and loss of consciousness.   Psychiatric/Behavioral: Negative for depression.   All other systems reviewed and are negative.       Objective:   /80 (BP Location: Left arm, Patient Position: Sitting, BP Cuff Size: Adult)   Pulse 74   Ht 1.727 m (5' 8\")   Wt 83.1 kg (183 lb 3.2 oz)   SpO2 92%   BMI 27.86 kg/m²     Physical Exam   Constitutional: He is oriented to person, place, and time. He appears well-developed and well-nourished. No distress.   HENT:   Head: Normocephalic and atraumatic.   Eyes: Conjunctivae are normal.   Neck: Normal range of motion. Neck supple.   Cardiovascular: Normal rate and regular rhythm.  Exam reveals no gallop and no friction rub.    Murmur heard.   Crescendo decrescendo systolic murmur is present with a grade of 2/6   Pulmonary/Chest: Effort normal and breath sounds normal. No respiratory distress. He has no wheezes. He has no rales.   Abdominal: Soft. He exhibits no distension. There is no tenderness.   Musculoskeletal: He exhibits no edema.   Neurological: He is alert and oriented to person, place, and time.   Skin: Skin is warm and dry. He is not diaphoretic.   Psychiatric: He has a normal mood and affect. His behavior is normal.   Nursing note and vitals reviewed.    Assessment:     1. Coronary artery disease involving native coronary artery of native heart without angina pectoris     2. Mixed hyperlipidemia  Lipid Profile   3. Essential hypertension  BASIC METABOLIC PANEL   4. Premature ventricular contractions     5. Systolic murmur  EC-ECHOCARDIOGRAM COMPLETE W/O CONT       Medical Decision Making:  Today's Assessment / Status " / Plan:     Systolic murmur: New issue for the patient.  He will be referred for an echocardiogram for further evaluation.    Coronary artery disease: Status post CABG.  No anginal symptoms.  Continue aspirin and statin.    Hyperlipidemia:  Continue Crestor and Zetia at current dose.  Lipid panel ordered.    Symptomatic PVCs: Patient has been asymptomatic.  Continue metoprolol at current dose.    Return to clinic in 1 year or earlier if needed.    Thank you for allowing me to participate in the care of this patient. Please do not hesitate to contact me with any questions.    Margarita Escamilla MD  Cardiologist  [facility]      PLEASE NOTE: This dictation was created using voice recognition software.

## 2018-12-05 ENCOUNTER — HOSPITAL ENCOUNTER (OUTPATIENT)
Dept: CARDIOLOGY | Facility: MEDICAL CENTER | Age: 63
End: 2018-12-05
Attending: INTERNAL MEDICINE
Payer: COMMERCIAL

## 2018-12-05 DIAGNOSIS — R01.1 SYSTOLIC MURMUR: ICD-10-CM

## 2018-12-05 LAB
LV EJECT FRACT  99904: 55
LV EJECT FRACT MOD 2C 99903: 41.1
LV EJECT FRACT MOD 4C 99902: 55.89
LV EJECT FRACT MOD BP 99901: 50.26

## 2018-12-05 PROCEDURE — 93306 TTE W/DOPPLER COMPLETE: CPT

## 2018-12-05 PROCEDURE — 93306 TTE W/DOPPLER COMPLETE: CPT | Mod: 26 | Performed by: INTERNAL MEDICINE

## 2018-12-10 ENCOUNTER — HOSPITAL ENCOUNTER (OUTPATIENT)
Dept: LAB | Facility: MEDICAL CENTER | Age: 63
End: 2018-12-10
Attending: INTERNAL MEDICINE
Payer: COMMERCIAL

## 2018-12-10 DIAGNOSIS — I10 ESSENTIAL HYPERTENSION: ICD-10-CM

## 2018-12-10 DIAGNOSIS — E78.2 MIXED HYPERLIPIDEMIA: ICD-10-CM

## 2018-12-10 PROCEDURE — 80048 BASIC METABOLIC PNL TOTAL CA: CPT

## 2018-12-10 PROCEDURE — 36415 COLL VENOUS BLD VENIPUNCTURE: CPT

## 2018-12-10 PROCEDURE — 80061 LIPID PANEL: CPT

## 2018-12-11 LAB
ANION GAP SERPL CALC-SCNC: 5 MMOL/L (ref 0–11.9)
BUN SERPL-MCNC: 20 MG/DL (ref 8–22)
CALCIUM SERPL-MCNC: 9.7 MG/DL (ref 8.5–10.5)
CHLORIDE SERPL-SCNC: 107 MMOL/L (ref 96–112)
CHOLEST SERPL-MCNC: 109 MG/DL (ref 100–199)
CO2 SERPL-SCNC: 31 MMOL/L (ref 20–33)
CREAT SERPL-MCNC: 0.98 MG/DL (ref 0.5–1.4)
FASTING STATUS PATIENT QL REPORTED: NORMAL
GLUCOSE SERPL-MCNC: 88 MG/DL (ref 65–99)
HDLC SERPL-MCNC: 44 MG/DL
LDLC SERPL CALC-MCNC: 27 MG/DL
POTASSIUM SERPL-SCNC: 4.4 MMOL/L (ref 3.6–5.5)
SODIUM SERPL-SCNC: 143 MMOL/L (ref 135–145)
TRIGL SERPL-MCNC: 188 MG/DL (ref 0–149)

## 2019-02-05 DIAGNOSIS — E78.5 HYPERLIPIDEMIA, UNSPECIFIED HYPERLIPIDEMIA TYPE: ICD-10-CM

## 2019-02-05 DIAGNOSIS — R00.2 PALPITATIONS: ICD-10-CM

## 2019-02-05 DIAGNOSIS — I25.10 CORONARY ARTERY DISEASE INVOLVING NATIVE CORONARY ARTERY OF NATIVE HEART WITHOUT ANGINA PECTORIS: ICD-10-CM

## 2019-02-05 DIAGNOSIS — R00.2 HEART PALPITATIONS: ICD-10-CM

## 2019-02-05 RX ORDER — EZETIMIBE 10 MG/1
10 TABLET ORAL
Qty: 90 TAB | Refills: 3 | Status: SHIPPED | OUTPATIENT
Start: 2019-02-05 | End: 2020-02-25 | Stop reason: SDUPTHER

## 2019-02-05 RX ORDER — ROSUVASTATIN CALCIUM 40 MG/1
40 TABLET, COATED ORAL
Qty: 90 TAB | Refills: 3 | Status: SHIPPED | OUTPATIENT
Start: 2019-02-05 | End: 2020-02-25 | Stop reason: SDUPTHER

## 2019-02-05 RX ORDER — METOPROLOL SUCCINATE 25 MG/1
12.5 TABLET, EXTENDED RELEASE ORAL DAILY
Qty: 45 TAB | Refills: 3 | Status: SHIPPED | OUTPATIENT
Start: 2019-02-05 | End: 2020-02-25 | Stop reason: SDUPTHER

## 2019-12-26 ENCOUNTER — OFFICE VISIT (OUTPATIENT)
Dept: URGENT CARE | Facility: MEDICAL CENTER | Age: 64
End: 2019-12-26
Payer: MEDICARE

## 2019-12-26 VITALS
TEMPERATURE: 98.7 F | SYSTOLIC BLOOD PRESSURE: 118 MMHG | OXYGEN SATURATION: 95 % | HEART RATE: 80 BPM | WEIGHT: 193 LBS | DIASTOLIC BLOOD PRESSURE: 82 MMHG | RESPIRATION RATE: 20 BRPM | BODY MASS INDEX: 29.35 KG/M2

## 2019-12-26 DIAGNOSIS — J40 BRONCHITIS: ICD-10-CM

## 2019-12-26 PROCEDURE — 99214 OFFICE O/P EST MOD 30 MIN: CPT | Performed by: FAMILY MEDICINE

## 2019-12-26 RX ORDER — BENZONATATE 100 MG/1
100 CAPSULE ORAL 3 TIMES DAILY PRN
Qty: 20 CAP | Refills: 0 | Status: SHIPPED
Start: 2019-12-26 | End: 2020-02-26

## 2019-12-26 RX ORDER — DOXYCYCLINE HYCLATE 100 MG
100 TABLET ORAL 2 TIMES DAILY
Qty: 20 TAB | Refills: 0 | Status: SHIPPED | OUTPATIENT
Start: 2019-12-26 | End: 2020-01-05

## 2019-12-26 ASSESSMENT — ENCOUNTER SYMPTOMS
DIZZINESS: 0
FEVER: 0
CHILLS: 0
COUGH: 1
EYE PAIN: 0
MYALGIAS: 0
VOMITING: 0
SPUTUM PRODUCTION: 1
NAUSEA: 0
SORE THROAT: 0
SHORTNESS OF BREATH: 0

## 2019-12-26 NOTE — PATIENT INSTRUCTIONS
Bronchitis  Bronchitis is a problem of the air tubes leading to your lungs. This problem makes it hard for air to get in and out of the lungs. You may cough a lot because your air tubes are narrow. Going without care can cause lasting (chronic) bronchitis.  HOME CARE   · Drink enough fluids to keep your pee (urine) clear or pale yellow.  · Use a cool mist humidifier.  · Quit smoking if you smoke. If you keep smoking, the bronchitis might not get better.  · Only take medicine as told by your doctor.  GET HELP RIGHT AWAY IF:   · Coughing keeps you awake.  · You start to wheeze.  · You become more sick or weak.  · You have a hard time breathing or get short of breath.  · You cough up blood.  · Coughing lasts more than 2 weeks.  · You have a fever.  · Your baby is older than 3 months with a rectal temperature of 102° F (38.9° C) or higher.  · Your baby is 3 months old or younger with a rectal temperature of 100.4° F (38° C) or higher.  MAKE SURE YOU:  · Understand these instructions.  · Will watch your condition.  · Will get help right away if you are not doing well or get worse.  Document Released: 06/05/2009 Document Revised: 03/11/2013 Document Reviewed: 11/19/2010  [employer]® Patient Information ©2014 [employer], [employer].

## 2019-12-26 NOTE — PROGRESS NOTES
Subjective:   Sarabjit Almendarez is a 64 y.o. male who presents for Cough (3 months , )        64-year-old male with a history of coronary artery disease status post CABG 2005 presents to the urgent care with a chief complaint of a cough for the past 3 months, worse past 3 days. the cough has recently kept the patient awake at night.  Denies dyspnea.  Denies hemoptysis.  Complains of intermittent rib cage soreness secondary to the coughing.    Cough   Associated symptoms include nasal congestion. Pertinent negatives include no chest pain, chills, fever, myalgias, rash, sore throat or shortness of breath. He has tried OTC cough suppressant for the symptoms. The treatment provided mild relief.     Review of Systems   Constitutional: Negative for chills and fever.   HENT: Negative for sore throat.    Eyes: Negative for pain.   Respiratory: Positive for cough and sputum production. Negative for shortness of breath.    Cardiovascular: Negative for chest pain.   Gastrointestinal: Negative for nausea and vomiting.   Genitourinary: Negative for hematuria.   Musculoskeletal: Negative for myalgias.   Skin: Negative for rash.   Neurological: Negative for dizziness.     No Known Allergies   Objective:   /82   Pulse 80   Temp 37.1 °C (98.7 °F) (Temporal)   Resp 20   Wt 87.5 kg (193 lb)   SpO2 95%   BMI 29.35 kg/m²   Physical Exam  Vitals signs and nursing note reviewed.   Constitutional:       General: He is not in acute distress.     Appearance: He is well-developed.   HENT:      Head: Normocephalic and atraumatic.   Eyes:      Conjunctiva/sclera: Conjunctivae normal.      Pupils: Pupils are equal, round, and reactive to light.   Cardiovascular:      Rate and Rhythm: Normal rate and regular rhythm.      Heart sounds: No murmur.   Pulmonary:      Effort: Pulmonary effort is normal. No respiratory distress.      Breath sounds: Rhonchi present.   Abdominal:      General: There is no distension.      Palpations:  Abdomen is soft.      Tenderness: There is no tenderness.   Musculoskeletal: Normal range of motion.   Skin:     General: Skin is warm and dry.   Neurological:      General: No focal deficit present.      Mental Status: He is alert and oriented to person, place, and time. Mental status is at baseline.      Gait: Gait (gait at baseline) normal.   Psychiatric:         Judgment: Judgment normal.           Assessment/Plan:   1. Bronchitis  - doxycycline (VIBRAMYCIN) 100 MG Tab; Take 1 Tab by mouth 2 times a day for 10 days.  Dispense: 20 Tab; Refill: 0  - benzonatate (TESSALON) 100 MG Cap; Take 1 Cap by mouth 3 times a day as needed for Cough.  Dispense: 20 Cap; Refill: 0    Differential diagnosis, natural history, supportive care, and indications for immediate follow-up discussed.     Advised the patient to follow-up with the primary care physician for recheck, reevaluation, and consideration of further management.

## 2020-02-25 ENCOUNTER — TELEPHONE (OUTPATIENT)
Dept: CARDIOLOGY | Facility: MEDICAL CENTER | Age: 65
End: 2020-02-25

## 2020-02-25 DIAGNOSIS — E78.5 HYPERLIPIDEMIA, UNSPECIFIED HYPERLIPIDEMIA TYPE: ICD-10-CM

## 2020-02-25 DIAGNOSIS — R00.2 PALPITATIONS: ICD-10-CM

## 2020-02-25 DIAGNOSIS — R00.2 HEART PALPITATIONS: ICD-10-CM

## 2020-02-25 DIAGNOSIS — I25.10 CORONARY ARTERY DISEASE INVOLVING NATIVE CORONARY ARTERY OF NATIVE HEART WITHOUT ANGINA PECTORIS: ICD-10-CM

## 2020-02-25 RX ORDER — ROSUVASTATIN CALCIUM 40 MG/1
40 TABLET, COATED ORAL
Qty: 30 TAB | Refills: 0 | Status: SHIPPED | OUTPATIENT
Start: 2020-02-25 | End: 2020-02-26 | Stop reason: SDUPTHER

## 2020-02-25 RX ORDER — EZETIMIBE 10 MG/1
10 TABLET ORAL
Qty: 30 TAB | Refills: 0 | Status: SHIPPED | OUTPATIENT
Start: 2020-02-25 | End: 2020-02-26 | Stop reason: SDUPTHER

## 2020-02-25 RX ORDER — METOPROLOL SUCCINATE 25 MG/1
12.5 TABLET, EXTENDED RELEASE ORAL DAILY
Qty: 15 TAB | Refills: 0 | Status: SHIPPED | OUTPATIENT
Start: 2020-02-25 | End: 2020-02-26 | Stop reason: SDUPTHER

## 2020-02-25 NOTE — TELEPHONE ENCOUNTER
Fiorella,         Patient needs refills for Zetia, Metoprolol and Rosuvastatin called in to [facility]. He said he only has one Rosuvastatin left, and is asking for it to be called in today.

## 2020-02-26 ENCOUNTER — OFFICE VISIT (OUTPATIENT)
Dept: CARDIOLOGY | Facility: MEDICAL CENTER | Age: 65
End: 2020-02-26
Payer: MEDICARE

## 2020-02-26 VITALS
WEIGHT: 185 LBS | OXYGEN SATURATION: 94 % | HEIGHT: 68 IN | DIASTOLIC BLOOD PRESSURE: 70 MMHG | SYSTOLIC BLOOD PRESSURE: 118 MMHG | HEART RATE: 80 BPM | BODY MASS INDEX: 28.04 KG/M2

## 2020-02-26 DIAGNOSIS — I10 ESSENTIAL HYPERTENSION: ICD-10-CM

## 2020-02-26 DIAGNOSIS — I25.83 CORONARY ARTERY DISEASE DUE TO LIPID RICH PLAQUE: ICD-10-CM

## 2020-02-26 DIAGNOSIS — E78.2 MIXED HYPERLIPIDEMIA: ICD-10-CM

## 2020-02-26 DIAGNOSIS — Z79.899 ENCOUNTER FOR LONG-TERM (CURRENT) USE OF HIGH-RISK MEDICATION: ICD-10-CM

## 2020-02-26 DIAGNOSIS — I49.3 PVC (PREMATURE VENTRICULAR CONTRACTION): ICD-10-CM

## 2020-02-26 DIAGNOSIS — I25.10 CORONARY ARTERY DISEASE DUE TO LIPID RICH PLAQUE: ICD-10-CM

## 2020-02-26 PROCEDURE — 99215 OFFICE O/P EST HI 40 MIN: CPT | Performed by: INTERNAL MEDICINE

## 2020-02-26 RX ORDER — METOPROLOL SUCCINATE 25 MG/1
12.5 TABLET, EXTENDED RELEASE ORAL DAILY
Qty: 45 TAB | Refills: 3 | Status: SHIPPED | OUTPATIENT
Start: 2020-02-26 | End: 2021-02-23 | Stop reason: SDUPTHER

## 2020-02-26 RX ORDER — ROSUVASTATIN CALCIUM 40 MG/1
40 TABLET, COATED ORAL
Qty: 90 TAB | Refills: 3 | Status: SHIPPED | OUTPATIENT
Start: 2020-02-26 | End: 2021-03-12 | Stop reason: SDUPTHER

## 2020-02-26 RX ORDER — EZETIMIBE 10 MG/1
10 TABLET ORAL
Qty: 90 TAB | Refills: 3 | Status: SHIPPED | OUTPATIENT
Start: 2020-02-26 | End: 2021-03-12 | Stop reason: SDUPTHER

## 2020-02-26 ASSESSMENT — ENCOUNTER SYMPTOMS
LOSS OF CONSCIOUSNESS: 0
SHORTNESS OF BREATH: 0
PALPITATIONS: 0
ORTHOPNEA: 0
DEPRESSION: 0
DIZZINESS: 0
ABDOMINAL PAIN: 0
PND: 0
FALLS: 0

## 2020-02-26 NOTE — PROGRESS NOTES
Chief Complaint   Patient presents with   • Coronary Artery Disease   • Premature Ventricular Contractions (PVCs)       Subjective:   Sarabjit Almendarez is a 64-year-old male presenting to clinic for follow-up on coronary artery disease.    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  He continues to exercise regularly.  Denies any anginal symptoms.  His blood pressures have been at goal, like today.  For his hyperlipidemia he continues to be on Crestor and Zetia which he is tolerating well.  He denies any recurrent palpitations and continues to be on metoprolol.    Past Medical History:   Diagnosis Date   • CAD (coronary artery disease)     CABG    • Hyperlipidemia    • Hypertension    • Palpitations     PVC's     Past Surgical History:   Procedure Laterality Date   • MULTIPLE CORONARY ARTERY BYPASS     • ZZZ CARDIAC CATH       Family History   Problem Relation Age of Onset   • Heart Disease Other      Social History     Socioeconomic History   • Marital status:      Spouse name: Not on file   • Number of children: Not on file   • Years of education: Not on file   • Highest education level: Not on file   Occupational History   • Not on file   Social Needs   • Financial resource strain: Not on file   • Food insecurity     Worry: Not on file     Inability: Not on file   • Transportation needs     Medical: Not on file     Non-medical: Not on file   Tobacco Use   • Smoking status: Never Smoker   • Smokeless tobacco: Never Used   Substance and Sexual Activity   • Alcohol use: No   • Drug use: Not on file   • Sexual activity: Not on file   Lifestyle   • Physical activity     Days per week: Not on file     Minutes per session: Not on file   • Stress: Not on file   Relationships   • Social connections     Talks on phone: Not on file     Gets together: Not on file     Attends Nondenominational service: Not on  file     Active member of club or organization: Not on file     Attends meetings of clubs or organizations: Not on file     Relationship status: Not on file   • Intimate partner violence     Fear of current or ex partner: Not on file     Emotionally abused: Not on file     Physically abused: Not on file     Forced sexual activity: Not on file   Other Topics Concern   • Not on file   Social History Narrative   • Not on file     No Known Allergies  Outpatient Encounter Medications as of 2/26/2020   Medication Sig Dispense Refill   • rosuvastatin (CRESTOR) 40 MG tablet Take 1 Tab by mouth every bedtime. 90 Tab 3   • metoprolol SR (TOPROL XL) 25 MG TABLET SR 24 HR Take 0.5 Tabs by mouth every day. 45 Tab 3   • ezetimibe (ZETIA) 10 MG Tab Take 1 Tab by mouth every bedtime. 90 Tab 3   • aspirin EC (ECOTRIN) 81 MG TBEC Take 81 mg by mouth every day.     • [DISCONTINUED] ezetimibe (ZETIA) 10 MG Tab Take 1 Tab by mouth every bedtime. 30 Tab 0   • [DISCONTINUED] metoprolol SR (TOPROL XL) 25 MG TABLET SR 24 HR Take 0.5 Tabs by mouth every day. 15 Tab 0   • [DISCONTINUED] rosuvastatin (CRESTOR) 40 MG tablet Take 1 Tab by mouth every bedtime. 30 Tab 0   • [DISCONTINUED] benzonatate (TESSALON) 100 MG Cap Take 1 Cap by mouth 3 times a day as needed for Cough. (Patient not taking: Reported on 2/26/2020) 20 Cap 0     No facility-administered encounter medications on file as of 2/26/2020.      Review of Systems   Constitutional: Negative for malaise/fatigue.   Respiratory: Negative for shortness of breath.    Cardiovascular: Negative for chest pain, palpitations, orthopnea, leg swelling and PND.   Gastrointestinal: Negative for abdominal pain.   Musculoskeletal: Negative for falls.   Neurological: Negative for dizziness and loss of consciousness.   Psychiatric/Behavioral: Negative for depression.   All other systems reviewed and are negative.       Objective:   /70 (BP Location: Left arm, Patient Position: Sitting, BP Cuff  "Size: Adult)   Pulse 80   Ht 1.727 m (5' 8\")   Wt 83.9 kg (185 lb)   SpO2 94%   BMI 28.13 kg/m²     Physical Exam   Constitutional: He is oriented to person, place, and time. He appears well-developed and well-nourished. No distress.   HENT:   Head: Normocephalic and atraumatic.   Eyes: Conjunctivae are normal. No scleral icterus.   Neck: Normal range of motion. Neck supple.   Cardiovascular: Normal rate, regular rhythm and normal heart sounds. Exam reveals no gallop and no friction rub.   No murmur heard.  Pulmonary/Chest: Effort normal and breath sounds normal. No respiratory distress. He has no wheezes. He has no rales.   Abdominal: Soft. He exhibits no distension. There is no abdominal tenderness.   Musculoskeletal:         General: No edema.   Neurological: He is alert and oriented to person, place, and time.   Skin: Skin is warm and dry. He is not diaphoretic.   Psychiatric: He has a normal mood and affect. His behavior is normal.   Nursing note and vitals reviewed.    Echocardiogram performed December 2018 was personally reviewed and per my interpretation showed normal LV systolic function.  RVSP 27 mmHg.  Aortic valve sclerosis.    Assessment:     1. Coronary artery disease due to lipid rich plaque  CBC WITHOUT DIFFERENTIAL   2. Essential hypertension  Basic Metabolic Panel   3. PVC (premature ventricular contraction)     4. Mixed hyperlipidemia  Lipid Profile   5. Encounter for long-term (current) use of high-risk medication         Medical Decision Making:  Today's Assessment / Status / Plan:     Coronary artery disease:  Hypertension:  Hyperlipidemia:  No anginal symptoms.  Continue aspirin.    Blood pressure is at goal.  Continue metoprolol.  Lipid panel ordered today.  Continue Lipitor at current dose.    Symptomatic PVCs: Patient has been asymptomatic.  Continue metoprolol.  Chem-7 ordered today.    Return to clinic in 1 year or earlier if needed.    Thank you for allowing me to participate in the " care of this patient. Please do not hesitate to contact me with any questions.    Margarita Escamilla MD  Cardiologist  [facility]      PLEASE NOTE: This dictation was created using voice recognition software.

## 2021-02-08 ENCOUNTER — TELEPHONE (OUTPATIENT)
Dept: CARDIOLOGY | Facility: MEDICAL CENTER | Age: 66
End: 2021-02-08

## 2021-02-08 NOTE — TELEPHONE ENCOUNTER
Chart Prep     Called patient in regards to standing lab order. No reply, left voicemail to call back.

## 2021-02-09 NOTE — TELEPHONE ENCOUNTER
Patient called back in regards to message above, patient states he will get labs done prior to appointment w/ at [facility].

## 2021-02-20 ENCOUNTER — HOSPITAL ENCOUNTER (OUTPATIENT)
Dept: LAB | Facility: MEDICAL CENTER | Age: 66
End: 2021-02-20
Attending: INTERNAL MEDICINE
Payer: MEDICARE

## 2021-02-20 DIAGNOSIS — E78.2 MIXED HYPERLIPIDEMIA: ICD-10-CM

## 2021-02-20 DIAGNOSIS — I10 ESSENTIAL HYPERTENSION: ICD-10-CM

## 2021-02-20 LAB
ANION GAP SERPL CALC-SCNC: 7 MMOL/L (ref 7–16)
BASOPHILS # BLD AUTO: 1.1 % (ref 0–1.8)
BASOPHILS # BLD: 0.08 K/UL (ref 0–0.12)
BUN SERPL-MCNC: 18 MG/DL (ref 8–22)
CALCIUM SERPL-MCNC: 9.7 MG/DL (ref 8.5–10.5)
CHLORIDE SERPL-SCNC: 106 MMOL/L (ref 96–112)
CHOLEST SERPL-MCNC: 112 MG/DL (ref 100–199)
CO2 SERPL-SCNC: 28 MMOL/L (ref 20–33)
CREAT SERPL-MCNC: 0.82 MG/DL (ref 0.5–1.4)
EOSINOPHIL # BLD AUTO: 0.44 K/UL (ref 0–0.51)
EOSINOPHIL NFR BLD: 5.9 % (ref 0–6.9)
ERYTHROCYTE [DISTWIDTH] IN BLOOD BY AUTOMATED COUNT: 40.2 FL (ref 35.9–50)
FASTING STATUS PATIENT QL REPORTED: NORMAL
GLUCOSE SERPL-MCNC: 84 MG/DL (ref 65–99)
HCT VFR BLD AUTO: 48.4 % (ref 42–52)
HDLC SERPL-MCNC: 45 MG/DL
HGB BLD-MCNC: 15.9 G/DL (ref 14–18)
IMM GRANULOCYTES # BLD AUTO: 0.04 K/UL (ref 0–0.11)
IMM GRANULOCYTES NFR BLD AUTO: 0.5 % (ref 0–0.9)
LDLC SERPL CALC-MCNC: 37 MG/DL
LYMPHOCYTES # BLD AUTO: 1.9 K/UL (ref 1–4.8)
LYMPHOCYTES NFR BLD: 25.3 % (ref 22–41)
MCH RBC QN AUTO: 28.6 PG (ref 27–33)
MCHC RBC AUTO-ENTMCNC: 32.9 G/DL (ref 33.7–35.3)
MCV RBC AUTO: 87.1 FL (ref 81.4–97.8)
MONOCYTES # BLD AUTO: 0.63 K/UL (ref 0–0.85)
MONOCYTES NFR BLD AUTO: 8.4 % (ref 0–13.4)
NEUTROPHILS # BLD AUTO: 4.41 K/UL (ref 1.82–7.42)
NEUTROPHILS NFR BLD: 58.8 % (ref 44–72)
NRBC # BLD AUTO: 0 K/UL
NRBC BLD-RTO: 0 /100 WBC
PLATELET # BLD AUTO: 209 K/UL (ref 164–446)
PMV BLD AUTO: 10.7 FL (ref 9–12.9)
POTASSIUM SERPL-SCNC: 4.4 MMOL/L (ref 3.6–5.5)
RBC # BLD AUTO: 5.56 M/UL (ref 4.7–6.1)
SODIUM SERPL-SCNC: 141 MMOL/L (ref 135–145)
TRIGL SERPL-MCNC: 148 MG/DL (ref 0–149)
WBC # BLD AUTO: 7.5 K/UL (ref 4.8–10.8)

## 2021-02-20 PROCEDURE — 80061 LIPID PANEL: CPT

## 2021-02-20 PROCEDURE — 36415 COLL VENOUS BLD VENIPUNCTURE: CPT

## 2021-02-20 PROCEDURE — 80048 BASIC METABOLIC PNL TOTAL CA: CPT

## 2021-02-20 PROCEDURE — 85025 COMPLETE CBC W/AUTO DIFF WBC: CPT

## 2021-02-23 ENCOUNTER — OFFICE VISIT (OUTPATIENT)
Dept: CARDIOLOGY | Facility: MEDICAL CENTER | Age: 66
End: 2021-02-23
Payer: MEDICARE

## 2021-02-23 VITALS
BODY MASS INDEX: 28.56 KG/M2 | HEART RATE: 80 BPM | DIASTOLIC BLOOD PRESSURE: 72 MMHG | WEIGHT: 182 LBS | HEIGHT: 67 IN | SYSTOLIC BLOOD PRESSURE: 120 MMHG | OXYGEN SATURATION: 94 %

## 2021-02-23 DIAGNOSIS — R06.02 SHORTNESS OF BREATH: ICD-10-CM

## 2021-02-23 DIAGNOSIS — I25.83 CORONARY ARTERY DISEASE DUE TO LIPID RICH PLAQUE: ICD-10-CM

## 2021-02-23 DIAGNOSIS — I49.3 PREMATURE VENTRICULAR CONTRACTIONS: ICD-10-CM

## 2021-02-23 DIAGNOSIS — I10 ESSENTIAL HYPERTENSION: ICD-10-CM

## 2021-02-23 DIAGNOSIS — Z79.899 ENCOUNTER FOR LONG-TERM (CURRENT) USE OF HIGH-RISK MEDICATION: ICD-10-CM

## 2021-02-23 DIAGNOSIS — I25.10 CORONARY ARTERY DISEASE DUE TO LIPID RICH PLAQUE: ICD-10-CM

## 2021-02-23 DIAGNOSIS — E78.2 MIXED HYPERLIPIDEMIA: ICD-10-CM

## 2021-02-23 PROCEDURE — 99214 OFFICE O/P EST MOD 30 MIN: CPT | Performed by: INTERNAL MEDICINE

## 2021-02-23 RX ORDER — METOPROLOL SUCCINATE 25 MG/1
25 TABLET, EXTENDED RELEASE ORAL DAILY
Qty: 90 TABLET | Refills: 3 | Status: SHIPPED | OUTPATIENT
Start: 2021-02-23 | End: 2021-12-07 | Stop reason: SDUPTHER

## 2021-02-23 ASSESSMENT — ENCOUNTER SYMPTOMS
ORTHOPNEA: 0
ABDOMINAL PAIN: 0
DEPRESSION: 0
PND: 0
SHORTNESS OF BREATH: 1
PALPITATIONS: 0
FALLS: 0
DIZZINESS: 0
LOSS OF CONSCIOUSNESS: 0

## 2021-02-23 NOTE — LETTER
[facility]   [address] [facility] [address]  [place] [address]  Phone: [phone]  Fax: [phone]              Sarabjit Almendarez  1955    Encounter Date: 2/23/2021    Margarita Escamilla M.D.          PROGRESS NOTE:  Chief Complaint   Patient presents with   • Coronary Artery Disease     F/V Dx: Coronary artery disease due to lipid rich plaque   • Premature Ventricular Contractions (PVCs)   • Hyperlipidemia       Subjective:   Sarabjit Almendarez is a 65-year-old male presenting to clinic for follow-up on coronary artery disease.    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  He denies having any anginal symptoms with exercise however at night when he is lying down he often feels short of breath.  No clear orthopnea.  No lower extremity edema.    His blood pressures usually in the 120s systolic.    Past Medical History:   Diagnosis Date   • CAD (coronary artery disease)     CABG    • Hyperlipidemia    • Hypertension    • Palpitations     PVC's     Past Surgical History:   Procedure Laterality Date   • MULTIPLE CORONARY ARTERY BYPASS     • ZZZ CARDIAC CATH       Family History   Problem Relation Age of Onset   • Heart Disease Other      Social History     Socioeconomic History   • Marital status:      Spouse name: Not on file   • Number of children: Not on file   • Years of education: Not on file   • Highest education level: Not on file   Occupational History   • Not on file   Tobacco Use   • Smoking status: Never Smoker   • Smokeless tobacco: Never Used   Substance and Sexual Activity   • Alcohol use: No   • Drug use: Not on file   • Sexual activity: Not on file   Other Topics Concern   • Not on file   Social History Narrative   • Not on file     Social Determinants of Health     Financial Resource Strain:    • Difficulty of Paying Living  Expenses:    Food Insecurity:    • Worried About Running Out of Food in the Last Year:    • Ran Out of Food in the Last Year:    Transportation Needs:    • Lack of Transportation (Medical):    • Lack of Transportation (Non-Medical):    Physical Activity:    • Days of Exercise per Week:    • Minutes of Exercise per Session:    Stress:    • Feeling of Stress :    Social Connections:    • Frequency of Communication with Friends and Family:    • Frequency of Social Gatherings with Friends and Family:    • Attends [employer] Services:    • Active Member of Clubs or Organizations:    • Attends Club or Organization Meetings:    • Marital Status:    Intimate Partner Violence:    • Fear of Current or Ex-Partner:    • Emotionally Abused:    • Physically Abused:    • Sexually Abused:      No Known Allergies  Outpatient Encounter Medications as of 2/23/2021   Medication Sig Dispense Refill   • metoprolol SR (TOPROL XL) 25 MG TABLET SR 24 HR Take 1 tablet by mouth every day. 90 tablet 3   • rosuvastatin (CRESTOR) 40 MG tablet Take 1 Tab by mouth every bedtime. 90 Tab 3   • ezetimibe (ZETIA) 10 MG Tab Take 1 Tab by mouth every bedtime. 90 Tab 3   • aspirin EC (ECOTRIN) 81 MG TBEC Take 81 mg by mouth every day.     • [DISCONTINUED] metoprolol SR (TOPROL XL) 25 MG TABLET SR 24 HR Take 0.5 Tabs by mouth every day. 45 Tab 3     No facility-administered encounter medications on file as of 2/23/2021.     Review of Systems   Constitutional: Negative for malaise/fatigue.   Respiratory: Positive for shortness of breath.    Cardiovascular: Negative for chest pain, palpitations, orthopnea, leg swelling and PND.   Gastrointestinal: Negative for abdominal pain.   Musculoskeletal: Negative for falls.   Neurological: Negative for dizziness and loss of consciousness.   Psychiatric/Behavioral: Negative for depression.   All other systems reviewed and are negative.       Objective:   /72 (BP Location: Left arm, Patient Position: Sitting, BP  "Cuff Size: Adult)   Pulse 80   Ht 1.702 m (5' 7\")   Wt 82.6 kg (182 lb)   SpO2 94%   BMI 28.51 kg/m²     Physical Exam   Constitutional: He is oriented to person, place, and time. He appears well-developed and well-nourished. No distress.   HENT:   Head: Normocephalic and atraumatic.   Eyes: Conjunctivae are normal. No scleral icterus.   Cardiovascular: Normal rate, regular rhythm and normal heart sounds. Exam reveals no gallop and no friction rub.   No murmur heard.  Pulmonary/Chest: Effort normal and breath sounds normal. No respiratory distress. He has no wheezes. He has no rales.   Musculoskeletal:         General: No edema.      Cervical back: Normal range of motion and neck supple.   Neurological: He is alert and oriented to person, place, and time.   Skin: Skin is warm and dry. He is not diaphoretic.   Psychiatric: He has a normal mood and affect. His behavior is normal. Judgment and thought content normal.   Nursing note and vitals reviewed.    Echocardiogram performed December 2018 was personally reviewed and per my interpretation showed normal LV systolic function.  RVSP 27 mmHg.  Aortic valve sclerosis.    Labs performed February 2021 were reviewed and showed normal creatinine.  LDL 37.    Assessment:     1. Shortness of breath  EC-ECHOCARDIOGRAM COMPLETE W/O CONT    EC-ECHOCARDIOGRAM REST/STRESS W/O CONT   2. Coronary artery disease due to lipid rich plaque  EC-ECHOCARDIOGRAM REST/STRESS W/O CONT   3. Essential hypertension     4. Mixed hyperlipidemia     5. Premature ventricular contractions     6. Encounter for long-term (current) use of high-risk medication         Medical Decision Making:  Today's Assessment / Status / Plan:     Patient reports having new onset shortness of breath.  Doubt angina however with his cardiac history, would recommend ischemic work-up with an exercise stress echocardiogram.  An echocardiogram to evaluate LV function and for valvular pathology has been ordered as " well.  Will increase metoprolol to 25 mg once daily.  Hypertension is well controlled.    For hyperlipidemia continue Crestor and Zetia at current dose.  LDL is at goal.    Patient has a history of symptomatic PVCs.  He has been asymptomatic.  Metoprolol as above.    Return to clinic in 3 months or earlier if needed.    Thank you for allowing me to participate in the care of this patient. Please do not hesitate to contact me with any questions.    Margarita Escamilla MD  Cardiologist  [facility]      PLEASE NOTE: This dictation was created using voice recognition software.           Mundo Van M.D.  [address]  [address]  [place] [address]  Via Fax: [phone]

## 2021-02-23 NOTE — PROGRESS NOTES
Chief Complaint   Patient presents with   • Coronary Artery Disease     F/V Dx: Coronary artery disease due to lipid rich plaque   • Premature Ventricular Contractions (PVCs)   • Hyperlipidemia       Subjective:   Sarabjit Almendarez is a 65-year-old male presenting to clinic for follow-up on coronary artery disease.    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  He denies having any anginal symptoms with exercise however at night when he is lying down he often feels short of breath.  No clear orthopnea.  No lower extremity edema.    His blood pressures usually in the 120s systolic.    Past Medical History:   Diagnosis Date   • CAD (coronary artery disease)     CABG    • Hyperlipidemia    • Hypertension    • Palpitations     PVC's     Past Surgical History:   Procedure Laterality Date   • MULTIPLE CORONARY ARTERY BYPASS     • ZZZ CARDIAC CATH       Family History   Problem Relation Age of Onset   • Heart Disease Other      Social History     Socioeconomic History   • Marital status:      Spouse name: Not on file   • Number of children: Not on file   • Years of education: Not on file   • Highest education level: Not on file   Occupational History   • Not on file   Tobacco Use   • Smoking status: Never Smoker   • Smokeless tobacco: Never Used   Substance and Sexual Activity   • Alcohol use: No   • Drug use: Not on file   • Sexual activity: Not on file   Other Topics Concern   • Not on file   Social History Narrative   • Not on file     Social Determinants of Health     Financial Resource Strain:    • Difficulty of Paying Living Expenses:    Food Insecurity:    • Worried About Running Out of Food in the Last Year:    • Ran Out of Food in the Last Year:    Transportation Needs:    • Lack of Transportation (Medical):    • Lack of Transportation (Non-Medical):    Physical Activity:    • Days of  "Exercise per Week:    • Minutes of Exercise per Session:    Stress:    • Feeling of Stress :    Social Connections:    • Frequency of Communication with Friends and Family:    • Frequency of Social Gatherings with Friends and Family:    • Attends [employer] Services:    • Active Member of Clubs or Organizations:    • Attends Club or Organization Meetings:    • Marital Status:    Intimate Partner Violence:    • Fear of Current or Ex-Partner:    • Emotionally Abused:    • Physically Abused:    • Sexually Abused:      No Known Allergies  Outpatient Encounter Medications as of 2/23/2021   Medication Sig Dispense Refill   • metoprolol SR (TOPROL XL) 25 MG TABLET SR 24 HR Take 1 tablet by mouth every day. 90 tablet 3   • rosuvastatin (CRESTOR) 40 MG tablet Take 1 Tab by mouth every bedtime. 90 Tab 3   • ezetimibe (ZETIA) 10 MG Tab Take 1 Tab by mouth every bedtime. 90 Tab 3   • aspirin EC (ECOTRIN) 81 MG TBEC Take 81 mg by mouth every day.     • [DISCONTINUED] metoprolol SR (TOPROL XL) 25 MG TABLET SR 24 HR Take 0.5 Tabs by mouth every day. 45 Tab 3     No facility-administered encounter medications on file as of 2/23/2021.     Review of Systems   Constitutional: Negative for malaise/fatigue.   Respiratory: Positive for shortness of breath.    Cardiovascular: Negative for chest pain, palpitations, orthopnea, leg swelling and PND.   Gastrointestinal: Negative for abdominal pain.   Musculoskeletal: Negative for falls.   Neurological: Negative for dizziness and loss of consciousness.   Psychiatric/Behavioral: Negative for depression.   All other systems reviewed and are negative.       Objective:   /72 (BP Location: Left arm, Patient Position: Sitting, BP Cuff Size: Adult)   Pulse 80   Ht 1.702 m (5' 7\")   Wt 82.6 kg (182 lb)   SpO2 94%   BMI 28.51 kg/m²     Physical Exam   Constitutional: He is oriented to person, place, and time. He appears well-developed and well-nourished. No distress.   HENT:   Head: " Normocephalic and atraumatic.   Eyes: Conjunctivae are normal. No scleral icterus.   Cardiovascular: Normal rate, regular rhythm and normal heart sounds. Exam reveals no gallop and no friction rub.   No murmur heard.  Pulmonary/Chest: Effort normal and breath sounds normal. No respiratory distress. He has no wheezes. He has no rales.   Musculoskeletal:         General: No edema.      Cervical back: Normal range of motion and neck supple.   Neurological: He is alert and oriented to person, place, and time.   Skin: Skin is warm and dry. He is not diaphoretic.   Psychiatric: He has a normal mood and affect. His behavior is normal. Judgment and thought content normal.   Nursing note and vitals reviewed.    Echocardiogram performed December 2018 was personally reviewed and per my interpretation showed normal LV systolic function.  RVSP 27 mmHg.  Aortic valve sclerosis.    Labs performed February 2021 were reviewed and showed normal creatinine.  LDL 37.    Assessment:     1. Shortness of breath  EC-ECHOCARDIOGRAM COMPLETE W/O CONT    EC-ECHOCARDIOGRAM REST/STRESS W/O CONT   2. Coronary artery disease due to lipid rich plaque  EC-ECHOCARDIOGRAM REST/STRESS W/O CONT   3. Essential hypertension     4. Mixed hyperlipidemia     5. Premature ventricular contractions     6. Encounter for long-term (current) use of high-risk medication         Medical Decision Making:  Today's Assessment / Status / Plan:     Patient reports having new onset shortness of breath.  Doubt angina however with his cardiac history, would recommend ischemic work-up with an exercise stress echocardiogram.  An echocardiogram to evaluate LV function and for valvular pathology has been ordered as well.  Will increase metoprolol to 25 mg once daily.  Hypertension is well controlled.    For hyperlipidemia continue Crestor and Zetia at current dose.  LDL is at goal.    Patient has a history of symptomatic PVCs.  He has been asymptomatic.  Metoprolol as  above.    Return to clinic in 3 months or earlier if needed.    Thank you for allowing me to participate in the care of this patient. Please do not hesitate to contact me with any questions.    Margarita Escamilla MD  Cardiologist  [facility]      PLEASE NOTE: This dictation was created using voice recognition software.

## 2021-03-12 DIAGNOSIS — I10 ESSENTIAL HYPERTENSION: ICD-10-CM

## 2021-03-12 DIAGNOSIS — I25.10 CORONARY ARTERY DISEASE DUE TO LIPID RICH PLAQUE: ICD-10-CM

## 2021-03-12 DIAGNOSIS — R00.2 PALPITATIONS: ICD-10-CM

## 2021-03-12 DIAGNOSIS — I25.83 CORONARY ARTERY DISEASE DUE TO LIPID RICH PLAQUE: ICD-10-CM

## 2021-03-12 DIAGNOSIS — I49.3 PREMATURE VENTRICULAR CONTRACTIONS: ICD-10-CM

## 2021-03-17 RX ORDER — EZETIMIBE 10 MG/1
10 TABLET ORAL
Qty: 90 TABLET | Refills: 3 | Status: SHIPPED | OUTPATIENT
Start: 2021-03-17 | End: 2021-12-07 | Stop reason: SDUPTHER

## 2021-03-17 RX ORDER — ROSUVASTATIN CALCIUM 40 MG/1
40 TABLET, COATED ORAL
Qty: 90 TABLET | Refills: 3 | Status: SHIPPED | OUTPATIENT
Start: 2021-03-17 | End: 2021-12-07 | Stop reason: SDUPTHER

## 2021-04-01 ENCOUNTER — HOSPITAL ENCOUNTER (OUTPATIENT)
Dept: CARDIOLOGY | Facility: MEDICAL CENTER | Age: 66
End: 2021-04-01
Attending: INTERNAL MEDICINE
Payer: MEDICARE

## 2021-04-01 DIAGNOSIS — I25.83 CORONARY ARTERY DISEASE DUE TO LIPID RICH PLAQUE: ICD-10-CM

## 2021-04-01 DIAGNOSIS — R06.02 SHORTNESS OF BREATH: ICD-10-CM

## 2021-04-01 DIAGNOSIS — I25.10 CORONARY ARTERY DISEASE DUE TO LIPID RICH PLAQUE: ICD-10-CM

## 2021-04-01 LAB
LV EJECT FRACT  99904: 60
LV EJECT FRACT  99904: 65
LV EJECT FRACT MOD 2C 99903: 53.35
LV EJECT FRACT MOD 4C 99902: 64.82
LV EJECT FRACT MOD BP 99901: 57.85

## 2021-04-01 PROCEDURE — 93306 TTE W/DOPPLER COMPLETE: CPT | Mod: 26,59 | Performed by: INTERNAL MEDICINE

## 2021-04-01 PROCEDURE — 93017 CV STRESS TEST TRACING ONLY: CPT

## 2021-04-01 PROCEDURE — 93018 CV STRESS TEST I&R ONLY: CPT | Performed by: INTERNAL MEDICINE

## 2021-04-01 PROCEDURE — 93350 STRESS TTE ONLY: CPT | Mod: 26,59 | Performed by: INTERNAL MEDICINE

## 2021-04-01 PROCEDURE — 93306 TTE W/DOPPLER COMPLETE: CPT | Mod: XU

## 2021-12-07 ENCOUNTER — OFFICE VISIT (OUTPATIENT)
Dept: CARDIOLOGY | Facility: MEDICAL CENTER | Age: 66
End: 2021-12-07
Payer: MEDICARE

## 2021-12-07 VITALS
WEIGHT: 183.1 LBS | HEART RATE: 78 BPM | BODY MASS INDEX: 28.74 KG/M2 | HEIGHT: 67 IN | SYSTOLIC BLOOD PRESSURE: 118 MMHG | DIASTOLIC BLOOD PRESSURE: 80 MMHG | RESPIRATION RATE: 14 BRPM | OXYGEN SATURATION: 96 %

## 2021-12-07 DIAGNOSIS — I10 ESSENTIAL HYPERTENSION: ICD-10-CM

## 2021-12-07 DIAGNOSIS — R00.2 PALPITATIONS: ICD-10-CM

## 2021-12-07 DIAGNOSIS — I25.83 CORONARY ARTERY DISEASE DUE TO LIPID RICH PLAQUE: ICD-10-CM

## 2021-12-07 DIAGNOSIS — I49.3 PREMATURE VENTRICULAR CONTRACTIONS: ICD-10-CM

## 2021-12-07 DIAGNOSIS — I25.10 CORONARY ARTERY DISEASE DUE TO LIPID RICH PLAQUE: ICD-10-CM

## 2021-12-07 PROCEDURE — 99214 OFFICE O/P EST MOD 30 MIN: CPT | Performed by: INTERNAL MEDICINE

## 2021-12-07 RX ORDER — ROSUVASTATIN CALCIUM 40 MG/1
40 TABLET, COATED ORAL
Qty: 90 TABLET | Refills: 3 | Status: SHIPPED | OUTPATIENT
Start: 2021-12-07 | End: 2022-12-03 | Stop reason: SDUPTHER

## 2021-12-07 RX ORDER — DOXYCYCLINE HYCLATE 50 MG/1
CAPSULE ORAL
COMMUNITY
Start: 2021-11-15 | End: 2021-12-07

## 2021-12-07 RX ORDER — METOPROLOL SUCCINATE 25 MG/1
25 TABLET, EXTENDED RELEASE ORAL DAILY
Qty: 90 TABLET | Refills: 3 | Status: SHIPPED | OUTPATIENT
Start: 2021-12-07 | End: 2022-12-03 | Stop reason: SDUPTHER

## 2021-12-07 RX ORDER — EZETIMIBE 10 MG/1
10 TABLET ORAL
Qty: 90 TABLET | Refills: 3 | Status: SHIPPED | OUTPATIENT
Start: 2021-12-07 | End: 2022-12-03 | Stop reason: SDUPTHER

## 2021-12-07 NOTE — PROGRESS NOTES
"      Cardiology Follow-up Consultation Note    Date of note:    12/7/2021    Primary Care Provider: Mundo Van M.D.    Name:             Sarabjit Almendarez     YOB: 1955  MRN:               [id number]    CC: Follow-up coronary artery disease, hyperlipidemia, hypertension    Patient ID/HPI:   65-year-old male patient with history of coronary artery bypass surgery in 2015, hyperlipidemia, hypertension here for follow-up.  Previously seen by Dr. Escamilla, during last evaluation he was having shortness of breath.  He was evaluated by echocardiogram, stress test both were negative.  He is here for follow-up.  He feels well.  He does not have the shortness of breath anymore.  No complaints today.        Past Medical History:   Diagnosis Date   • CAD (coronary artery disease)     CABG    • Hyperlipidemia    • Hypertension    • Palpitations     PVC's         Past Surgical History:   Procedure Laterality Date   • MULTIPLE CORONARY ARTERY BYPASS     • ZZZ CARDIAC CATH           Current Outpatient Medications   Medication Sig Dispense Refill   • rosuvastatin (CRESTOR) 40 MG tablet Take 1 Tablet by mouth at bedtime. 90 Tablet 3   • metoprolol SR (TOPROL XL) 25 MG TABLET SR 24 HR Take 1 Tablet by mouth every day. 90 Tablet 3   • ezetimibe (ZETIA) 10 MG Tab Take 1 Tablet by mouth at bedtime. 90 Tablet 3   • aspirin EC (ECOTRIN) 81 MG TBEC Take 81 mg by mouth every day.       No current facility-administered medications for this visit.         No Known Allergies      Family History   Problem Relation Age of Onset   • Heart Disease Other              Physical Exam:  Ambulatory Vitals  /80 (BP Location: Left arm, Patient Position: Sitting, BP Cuff Size: Adult)   Pulse 78   Resp 14   Ht 1.702 m (5' 7\")   Wt 83.1 kg (183 lb 1.6 oz)   SpO2 96%    Oxygen Therapy:  Pulse Oximetry: 96 %  BP Readings from Last 4 Encounters:   12/07/21 118/80   02/23/21 120/72   02/26/20 118/70   12/26/19 118/82 "       Weight/BMI: Body mass index is 28.68 kg/m².  Wt Readings from Last 4 Encounters:   12/07/21 83.1 kg (183 lb 1.6 oz)   02/23/21 82.6 kg (182 lb)   02/26/20 83.9 kg (185 lb)   12/26/19 87.5 kg (193 lb)       General: Well appearing and in no apparent distress  Head: atrumatic  Eyes: No conjunctival pallor   ENT: normal external appearance of nose and ears  Neck: JVD absent, carotid bruits absent  Lungs: respiratory sounds  normal, additional breath sounds absent  Heart: Regular rhythm,   No palpable thrills on palpation, murmurs absent, no rubs,   Lower extremity edema absent.   Pedal pulses normal    Lab Data Review:  Lab Results   Component Value Date/Time    CHOLSTRLTOT 112 02/20/2021 09:18 AM    LDL 37 02/20/2021 09:18 AM    HDL 45 02/20/2021 09:18 AM    TRIGLYCERIDE 148 02/20/2021 09:18 AM       Lab Results   Component Value Date/Time    SODIUM 141 02/20/2021 09:18 AM    POTASSIUM 4.4 02/20/2021 09:18 AM    CHLORIDE 106 02/20/2021 09:18 AM    CO2 28 02/20/2021 09:18 AM    GLUCOSE 84 02/20/2021 09:18 AM    BUN 18 02/20/2021 09:18 AM    CREATININE 0.82 02/20/2021 09:18 AM    CREATININE 1.0 08/07/2006 10:20 AM    BUNCREATRAT 16 01/21/2015 08:52 AM     Lab Results   Component Value Date/Time    ALKPHOSPHAT 50 01/26/2018 09:18 AM    ASTSGOT 24 01/26/2018 09:18 AM    ALTSGPT 24 01/26/2018 09:18 AM    TBILIRUBIN 1.4 01/26/2018 09:18 AM      Lab Results   Component Value Date/Time    WBC 7.5 02/20/2021 09:18 AM     Prior cardiology notes reviewed.  Echocardiogram, stress test done in April 2021 reviewed, personally interpreted normal.    Impression and Plan:  65-year-old male patient with coronary artery disease prior bypass surgery, hyperlipidemia, hypertension, here for follow-up.    He is stable from cardiac standpoint.  No symptoms or complaints today.  Continue current therapy with aspirin, Zetia, metoprolol, Crestor.  Blood pressure, cholesterol well controlled.    Return in about 1 year (around  12/7/2022).      Tyrone HERNANDEZ  Interventional cardiologist  [facility], [address]  [address] [address]  Phone: [phone]  Fax: [phone]

## 2022-11-08 ENCOUNTER — PATIENT MESSAGE (OUTPATIENT)
Dept: HEALTH INFORMATION MANAGEMENT | Facility: OTHER | Age: 67
End: 2022-11-08

## 2022-12-01 DIAGNOSIS — R00.2 PALPITATIONS: ICD-10-CM

## 2022-12-01 DIAGNOSIS — I25.10 CORONARY ARTERY DISEASE DUE TO LIPID RICH PLAQUE: ICD-10-CM

## 2022-12-01 DIAGNOSIS — I49.3 PREMATURE VENTRICULAR CONTRACTIONS: ICD-10-CM

## 2022-12-01 DIAGNOSIS — I25.83 CORONARY ARTERY DISEASE DUE TO LIPID RICH PLAQUE: ICD-10-CM

## 2022-12-01 DIAGNOSIS — I10 ESSENTIAL HYPERTENSION: ICD-10-CM

## 2022-12-01 NOTE — TELEPHONE ENCOUNTER
MEDICATION REFILL : AK    Caller: Sarabjit Almendarez    Medication Name and Dosage:     METOPROLOL 25MG  ROSUVASTATIN 40MG  ZETIA 10MG     Please call your pharmacy and have them send us a refill request or speak to a live representative, RX number may have changed.    Medication amount left: NONE    Preferred Pharmacy:     [facility] [address] [facility], [place] - [address]    Other questions (Topic): NONE    Callback Number (Will only call for issues): [phone] (home)

## 2022-12-03 NOTE — TELEPHONE ENCOUNTER
Is the patient due for a refill? Yes    Was the patient seen the past year? Yes    Date of last office visit: 12/7/21    Does the patient have an upcoming appointment?  No   If yes, When?     Provider to refill:AK    Does the patients insurance require a 100 day supply?  No

## 2022-12-05 RX ORDER — EZETIMIBE 10 MG/1
10 TABLET ORAL
Qty: 90 TABLET | Refills: 0 | Status: SHIPPED | OUTPATIENT
Start: 2022-12-05 | End: 2023-03-09 | Stop reason: SDUPTHER

## 2022-12-05 RX ORDER — METOPROLOL SUCCINATE 25 MG/1
25 TABLET, EXTENDED RELEASE ORAL DAILY
Qty: 90 TABLET | Refills: 0 | Status: SHIPPED | OUTPATIENT
Start: 2022-12-05 | End: 2023-03-09 | Stop reason: SDUPTHER

## 2022-12-05 RX ORDER — ROSUVASTATIN CALCIUM 40 MG/1
40 TABLET, COATED ORAL
Qty: 90 TABLET | Refills: 0 | Status: SHIPPED | OUTPATIENT
Start: 2022-12-05 | End: 2023-03-09 | Stop reason: SDUPTHER

## 2023-03-07 ENCOUNTER — TELEPHONE (OUTPATIENT)
Dept: CARDIOLOGY | Facility: MEDICAL CENTER | Age: 68
End: 2023-03-07
Payer: MEDICARE

## 2023-03-07 DIAGNOSIS — I49.3 PREMATURE VENTRICULAR CONTRACTIONS: ICD-10-CM

## 2023-03-07 DIAGNOSIS — I25.83 CORONARY ARTERY DISEASE DUE TO LIPID RICH PLAQUE: ICD-10-CM

## 2023-03-07 DIAGNOSIS — R00.2 PALPITATIONS: ICD-10-CM

## 2023-03-07 DIAGNOSIS — I25.10 CORONARY ARTERY DISEASE DUE TO LIPID RICH PLAQUE: ICD-10-CM

## 2023-03-07 DIAGNOSIS — I10 ESSENTIAL HYPERTENSION: ICD-10-CM

## 2023-03-08 NOTE — TELEPHONE ENCOUNTER
AK    Caller: Oscar Whipple    Medication Name and Dosage:    rosuvastatin (CRESTOR) 40 MG tablet [[id number]]    metoprolol SR (TOPROL XL) 25 MG TABLET SR 24 HR [[id number]]    ezetimibe (ZETIA) 10 MG Tab [[id number]]    Medication amount left: 5    Preferred Pharmacy:   [facility]    Other questions (Topic): N/A    Callback Number (Will only call for issues): [phone]    Thank you,   Alejandrina LOPEZ

## 2023-03-09 RX ORDER — EZETIMIBE 10 MG/1
10 TABLET ORAL
Qty: 30 TABLET | Refills: 0 | Status: SHIPPED | OUTPATIENT
Start: 2023-03-09 | End: 2023-05-03 | Stop reason: SDUPTHER

## 2023-03-09 RX ORDER — METOPROLOL SUCCINATE 25 MG/1
25 TABLET, EXTENDED RELEASE ORAL DAILY
Qty: 30 TABLET | Refills: 0 | Status: SHIPPED | OUTPATIENT
Start: 2023-03-09 | End: 2023-04-03 | Stop reason: SDUPTHER

## 2023-03-09 RX ORDER — ROSUVASTATIN CALCIUM 40 MG/1
40 TABLET, COATED ORAL
Qty: 30 TABLET | Refills: 0 | Status: SHIPPED | OUTPATIENT
Start: 2023-03-09 | End: 2023-04-03 | Stop reason: SDUPTHER

## 2023-03-09 NOTE — TELEPHONE ENCOUNTER
Is the patient due for a refill? Yes    Was the patient seen the past year? No    Date of last office visit: 12/7/21    Does the patient have an upcoming appointment?  No   If yes, When?     Provider to refill:AK    Does the patients insurance require a 100 day supply?  No

## 2023-04-03 ENCOUNTER — TELEPHONE (OUTPATIENT)
Dept: CARDIOLOGY | Facility: MEDICAL CENTER | Age: 68
End: 2023-04-03
Payer: MEDICARE

## 2023-04-03 DIAGNOSIS — I49.3 PREMATURE VENTRICULAR CONTRACTIONS: ICD-10-CM

## 2023-04-03 DIAGNOSIS — I25.83 CORONARY ARTERY DISEASE DUE TO LIPID RICH PLAQUE: ICD-10-CM

## 2023-04-03 DIAGNOSIS — I10 ESSENTIAL HYPERTENSION: ICD-10-CM

## 2023-04-03 DIAGNOSIS — R00.2 PALPITATIONS: ICD-10-CM

## 2023-04-03 DIAGNOSIS — I25.10 CORONARY ARTERY DISEASE DUE TO LIPID RICH PLAQUE: ICD-10-CM

## 2023-04-03 RX ORDER — METOPROLOL SUCCINATE 25 MG/1
25 TABLET, EXTENDED RELEASE ORAL DAILY
Qty: 30 TABLET | Refills: 0 | Status: SHIPPED | OUTPATIENT
Start: 2023-04-03 | End: 2023-04-04 | Stop reason: SDUPTHER

## 2023-04-03 RX ORDER — ROSUVASTATIN CALCIUM 40 MG/1
40 TABLET, COATED ORAL
Qty: 30 TABLET | Refills: 0 | Status: SHIPPED | OUTPATIENT
Start: 2023-04-03 | End: 2023-04-04 | Stop reason: SDUPTHER

## 2023-04-04 RX ORDER — METOPROLOL SUCCINATE 25 MG/1
25 TABLET, EXTENDED RELEASE ORAL DAILY
Qty: 90 TABLET | Refills: 3 | Status: SHIPPED | OUTPATIENT
Start: 2023-04-04 | End: 2024-03-26 | Stop reason: SDUPTHER

## 2023-04-04 RX ORDER — ROSUVASTATIN CALCIUM 40 MG/1
40 TABLET, COATED ORAL
Qty: 90 TABLET | Refills: 3 | Status: SHIPPED | OUTPATIENT
Start: 2023-04-04 | End: 2024-03-26 | Stop reason: SDUPTHER

## 2023-05-03 ENCOUNTER — OFFICE VISIT (OUTPATIENT)
Dept: CARDIOLOGY | Facility: MEDICAL CENTER | Age: 68
End: 2023-05-03
Payer: MEDICARE

## 2023-05-03 VITALS
WEIGHT: 185 LBS | HEART RATE: 60 BPM | BODY MASS INDEX: 28.04 KG/M2 | RESPIRATION RATE: 16 BRPM | SYSTOLIC BLOOD PRESSURE: 110 MMHG | HEIGHT: 68 IN | OXYGEN SATURATION: 93 % | DIASTOLIC BLOOD PRESSURE: 66 MMHG

## 2023-05-03 DIAGNOSIS — I25.83 CORONARY ARTERY DISEASE DUE TO LIPID RICH PLAQUE: ICD-10-CM

## 2023-05-03 DIAGNOSIS — E78.2 MIXED HYPERLIPIDEMIA: ICD-10-CM

## 2023-05-03 DIAGNOSIS — I10 ESSENTIAL HYPERTENSION: ICD-10-CM

## 2023-05-03 DIAGNOSIS — I25.10 CORONARY ARTERY DISEASE DUE TO LIPID RICH PLAQUE: ICD-10-CM

## 2023-05-03 PROBLEM — L71.9 ROSACEA: Status: ACTIVE | Noted: 2022-12-19

## 2023-05-03 PROCEDURE — 99214 OFFICE O/P EST MOD 30 MIN: CPT

## 2023-05-03 RX ORDER — EZETIMIBE 10 MG/1
10 TABLET ORAL
Qty: 100 TABLET | Refills: 3 | Status: SHIPPED | OUTPATIENT
Start: 2023-05-03 | End: 2024-03-26 | Stop reason: SDUPTHER

## 2023-05-03 ASSESSMENT — ENCOUNTER SYMPTOMS
DEPRESSION: 0
NEUROLOGICAL NEGATIVE: 1
EYES NEGATIVE: 1
MUSCULOSKELETAL NEGATIVE: 1
PND: 0
ORTHOPNEA: 0
GASTROINTESTINAL NEGATIVE: 1
SHORTNESS OF BREATH: 0
CONSTITUTIONAL NEGATIVE: 1
NERVOUS/ANXIOUS: 0
PALPITATIONS: 0

## 2023-05-03 ASSESSMENT — FIBROSIS 4 INDEX: FIB4 SCORE: 1.68

## 2023-05-03 NOTE — PROGRESS NOTES
Chief Complaint   Patient presents with    Coronary Artery Disease    Palpitations    Premature Ventricular Contractions (PVCs)       Subjective     Sarabjit Almendarez is a 67 y.o. male who presents today for annual follow up. They have a history of CAD s/p CABG in 2005, HTN, HLD.    They are a patient of Dr. Viveros, last seen on 12/7/2021.  At that visit he was doing well over all, no complaints, he had had an echocardiogram and a stress test for some shortness of breath at his previous visit but these were both negative.    Since their last visit they have been feeling really well, continues to have no cardiovascular complaints.    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    Past Medical History:   Diagnosis Date    CAD (coronary artery disease)     CABG     Hyperlipidemia     Hypertension     Palpitations     PVC's     Past Surgical History:   Procedure Laterality Date    MULTIPLE CORONARY ARTERY BYPASS      ZZZ CARDIAC CATH       Family History   Problem Relation Age of Onset    Heart Disease Other      Social History     Socioeconomic History    Marital status:      Spouse name: Not on file    Number of children: Not on file    Years of education: Not on file    Highest education level: Not on file   Occupational History    Not on file   Tobacco Use    Smoking status: Never    Smokeless tobacco: Never   Substance and Sexual Activity    Alcohol use: No    Drug use: Never    Sexual activity: Not on file   Other Topics Concern    Not on file   Social History Narrative    Not on file     Social Determinants of Health     Financial Resource Strain: Not on file   Food Insecurity: Not on file   Transportation Needs: Not on file   Physical Activity: Not on file   Stress: Not on file   Social Connections: Not on file   Intimate Partner Violence: Not on file   Housing Stability: Not on  "file     No Known Allergies  Outpatient Encounter Medications as of 5/3/2023   Medication Sig Dispense Refill    ezetimibe (ZETIA) 10 MG Tab Take 1 Tablet by mouth at bedtime. 100 Tablet 3    metoprolol SR (TOPROL XL) 25 MG TABLET SR 24 HR Take 1 Tablet by mouth every day. 90 Tablet 3    rosuvastatin (CRESTOR) 40 MG tablet Take 1 Tablet by mouth at bedtime. 90 Tablet 3    aspirin EC (ECOTRIN) 81 MG TBEC Take 81 mg by mouth every day.      [DISCONTINUED] ezetimibe (ZETIA) 10 MG Tab Take 1 Tablet by mouth at bedtime. 30 Tablet 0     No facility-administered encounter medications on file as of 5/3/2023.     Review of Systems   Constitutional: Negative.    HENT: Negative.     Eyes: Negative.    Respiratory:  Negative for shortness of breath.    Cardiovascular:  Negative for chest pain, palpitations, orthopnea, leg swelling and PND.   Gastrointestinal: Negative.    Genitourinary: Negative.    Musculoskeletal: Negative.    Skin: Negative.    Neurological: Negative.    Endo/Heme/Allergies: Negative.    Psychiatric/Behavioral:  Negative for depression. The patient is not nervous/anxious.             Objective     /66 (BP Location: Left arm, Patient Position: Sitting, BP Cuff Size: Adult)   Pulse 60   Resp 16   Ht 1.727 m (5' 8\")   Wt 83.9 kg (185 lb)   SpO2 93%   BMI 28.13 kg/m²     Physical Exam  Constitutional:       Appearance: Normal appearance.   HENT:      Head: Normocephalic.   Neck:      Vascular: No JVD.   Cardiovascular:      Rate and Rhythm: Normal rate and regular rhythm.      Pulses: Normal pulses.      Heart sounds: Normal heart sounds. No murmur heard.    No friction rub.   Pulmonary:      Effort: Pulmonary effort is normal.      Breath sounds: Normal breath sounds.   Abdominal:      Palpations: Abdomen is soft.   Musculoskeletal:         General: Normal range of motion.      Right lower leg: No edema.      Left lower leg: No edema.   Skin:     General: Skin is warm and dry.   Neurological:      " General: No focal deficit present.      Mental Status: He is alert and oriented to person, place, and time.   Psychiatric:         Mood and Affect: Mood normal.         Behavior: Behavior normal.          Lab Results   Component Value Date/Time    CHOLSTRLTOT 112 02/20/2021 09:18 AM    LDL 37 02/20/2021 09:18 AM    HDL 45 02/20/2021 09:18 AM    TRIGLYCERIDE 148 02/20/2021 09:18 AM       Lab Results   Component Value Date/Time    SODIUM 143 12/20/2022 06:31 AM    SODIUM 141 02/20/2021 09:18 AM    POTASSIUM 4.6 12/20/2022 06:31 AM    POTASSIUM 4.4 02/20/2021 09:18 AM    CHLORIDE 105 12/20/2022 06:31 AM    CHLORIDE 106 02/20/2021 09:18 AM    CO2 28 12/20/2022 06:31 AM    CO2 28 02/20/2021 09:18 AM    GLUCOSE 92 12/20/2022 06:31 AM    GLUCOSE 84 02/20/2021 09:18 AM    BUN 15 12/20/2022 06:31 AM    BUN 18 02/20/2021 09:18 AM    CREATININE 0.89 12/20/2022 06:31 AM    CREATININE 0.82 02/20/2021 09:18 AM    CREATININE 1.0 08/07/2006 10:20 AM    BUNCREATRAT 17 12/20/2022 06:31 AM    BUNCREATRAT 16 01/21/2015 08:52 AM     Lab Results   Component Value Date/Time    ALKPHOSPHAT 67 12/20/2022 06:31 AM    ALKPHOSPHAT 50 01/26/2018 09:18 AM    ASTSGOT 33 12/20/2022 06:31 AM    ASTSGOT 24 01/26/2018 09:18 AM    ALTSGPT 34 12/20/2022 06:31 AM    ALTSGPT 24 01/26/2018 09:18 AM    TBILIRUBIN 1.0 12/20/2022 06:31 AM    TBILIRUBIN 1.4 01/26/2018 09:18 AM      Echocardiogram 4/1/2021  CONCLUSIONS  Normal left ventricular systolic function and diastolic function.  Left ventricular ejection fraction is visually estimated to be 60%.  Normal inferior vena cava size and inspiratory collapse.  Estimated right ventricular systolic pressure  is 25 mmHg.    Stress echocardiogram 4/1/2021  CONCLUSIONS   Negative stress echocardiogram for ischemia with adequate stress achieved by   heart rate criteria.     Assessment & Plan     1. Coronary artery disease due to lipid rich plaque  ezetimibe (ZETIA) 10 MG Tab      2. Essential hypertension  ezetimibe  (ZETIA) 10 MG Tab      3. Mixed hyperlipidemia            Medical Decision Making: Today's Assessment/Status/Plan:          Coronary Artery Disease  -Status post bypass in 2005  - continue aspirin 81 mg daily, metoprolol 25 mg daily, rosuvastatin 40 mg daily, Zetia 10 mg daily  We addressed the management of atherosclerotic artery disease.  He is on proper antiplatelet, cholesterol management and beta-blockers as appropriate.  We addressed the potential side effects and laboratory follow-up for these medications.    Hypertension  -Good control  - continue medications as outlined above  - goal < 130/80  - check BP daily 2 hours after taking medication and keep log of measurements     Hyperlipidemia  -Most recent LDL 45  -Continue rosuvastatin and Zetia  -Goal of less than 70  -Check lipid panel annually    Follow-up with JANAY White in 1 year with annual labs    This note was dictated using Dragon speech recognition software.

## 2023-05-17 ENCOUNTER — TELEPHONE (OUTPATIENT)
Dept: CARDIOLOGY | Facility: MEDICAL CENTER | Age: 68
End: 2023-05-17
Payer: MEDICARE

## 2023-05-17 NOTE — TELEPHONE ENCOUNTER
Caller: Sarabjit Almendarez    Topic/issue: RESULTS    Patient states that he had a Xray done at [facility] on [address] and plaque was found in the abdominal aorta and he would like to know what this means. Please advise.    Thank you,  Ciro WISE    Callback Number: [phone] (home)

## 2023-05-18 ENCOUNTER — PATIENT MESSAGE (OUTPATIENT)
Dept: CARDIOLOGY | Facility: MEDICAL CENTER | Age: 68
End: 2023-05-18
Payer: MEDICARE

## 2023-05-18 NOTE — TELEPHONE ENCOUNTER
Records need to be requested, MyChart message sent to patient, awaiting patient response and will follow up as needed.

## 2023-05-19 NOTE — PATIENT COMMUNICATION
Faxed stat request for imaging report from [facility]. Conformation received and scan into [facility] [place] [place].

## 2024-01-31 ENCOUNTER — TELEPHONE (OUTPATIENT)
Dept: CARDIOLOGY | Facility: MEDICAL CENTER | Age: 69
End: 2024-01-31
Payer: MEDICARE

## 2024-01-31 NOTE — TELEPHONE ENCOUNTER
Caller: Altagracia(pt's spouse)    Office Name, phone number, fax number: Office of [facility], # [phone], fax# n/a    Fax clearance to [phone]    Procedure Name: oncology surgery for squamous carcinoma    Procedure Scheduled Date: n/a    Callback Number: [phone]      Thank you    -Jonah SARKAR

## 2024-02-12 ENCOUNTER — TELEPHONE (OUTPATIENT)
Dept: CARDIOLOGY | Facility: MEDICAL CENTER | Age: 69
End: 2024-02-12
Payer: MEDICARE

## 2024-02-12 NOTE — LETTER
PROCEDURE/SURGERY CLEARANCE FORM      Encounter Date: 2/12/2024    Patient: Sarabjit Almendarez  YOB: 1955    CARDIOLOGIST:  SHARON Villafuerte     REFERRING DOCTOR:  No ref. provider found        The above patient is cleared to have the following procedure/surgery: Left transoral robotic surgery, left selective neck dissection, left tongue base resection, dental extractions x 1 tooth #26                                            Additional comments: Ok to proceed. Hold aspirin if needed.       SHARON Villafuerte   Electronically Signed

## 2024-02-12 NOTE — TELEPHONE ENCOUNTER
Last OV: 5.3.2023  Proposed Surgery: Left transoral robotic surgery, left selective neck dissection, left tongue base resection, dental extractions x 1 tooth #26  Surgery Date: 2.27.2024  Requesting Office Name: [facility]  Fax Number: [phone]  Preference of Location (default is surgery center unless specified by Cardiologist or PIPPA)  Prior Clearance Addressed: No      Anticoags/Antiplatelets: Aspirin  Anticoags/Antiplatelet managed by Cardiology? No Provider to advise.    Outstanding Cardiac Imaging : No  Stent, Cardiac Devices, or Catheterization: No  Ablation, TAVR/Valve (including open heart), Cardioversion: No  Recent Cardiac Hospitalization: No            When: N/A  History (cardiac history):   Past Medical History:   Diagnosis Date    CAD (coronary artery disease)     CABG     Hyperlipidemia     Hypertension     Palpitations     PVC's             Surgical Clearance Letter Sent: No Provider to advise.   **Scan clearance request letter into [facility].**

## 2024-03-26 ENCOUNTER — OFFICE VISIT (OUTPATIENT)
Dept: CARDIOLOGY | Facility: MEDICAL CENTER | Age: 69
End: 2024-03-26
Payer: MEDICARE

## 2024-03-26 VITALS
HEART RATE: 66 BPM | RESPIRATION RATE: 16 BRPM | WEIGHT: 177 LBS | SYSTOLIC BLOOD PRESSURE: 102 MMHG | BODY MASS INDEX: 26.83 KG/M2 | DIASTOLIC BLOOD PRESSURE: 68 MMHG | HEIGHT: 68 IN | OXYGEN SATURATION: 94 %

## 2024-03-26 DIAGNOSIS — I25.10 CORONARY ARTERY DISEASE DUE TO LIPID RICH PLAQUE: ICD-10-CM

## 2024-03-26 DIAGNOSIS — I25.83 CORONARY ARTERY DISEASE DUE TO LIPID RICH PLAQUE: ICD-10-CM

## 2024-03-26 DIAGNOSIS — I49.3 PREMATURE VENTRICULAR CONTRACTIONS: ICD-10-CM

## 2024-03-26 DIAGNOSIS — I10 ESSENTIAL HYPERTENSION: ICD-10-CM

## 2024-03-26 DIAGNOSIS — R00.2 PALPITATIONS: ICD-10-CM

## 2024-03-26 DIAGNOSIS — E78.2 MIXED HYPERLIPIDEMIA: ICD-10-CM

## 2024-03-26 PROBLEM — R73.03 PRE-DIABETES: Status: ACTIVE | Noted: 2024-02-20

## 2024-03-26 PROBLEM — C01 SQUAMOUS CELL CARCINOMA OF BASE OF TONGUE (HCC): Status: ACTIVE | Noted: 2024-01-29

## 2024-03-26 PROBLEM — B97.7 HPV IN MALE: Status: ACTIVE | Noted: 2024-01-29

## 2024-03-26 PROBLEM — C79.89 METASTATIC SQUAMOUS CELL CARCINOMA TO HEAD AND NECK (HCC): Status: ACTIVE | Noted: 2024-01-29

## 2024-03-26 PROCEDURE — 3074F SYST BP LT 130 MM HG: CPT

## 2024-03-26 PROCEDURE — 99211 OFF/OP EST MAY X REQ PHY/QHP: CPT

## 2024-03-26 PROCEDURE — 99214 OFFICE O/P EST MOD 30 MIN: CPT

## 2024-03-26 PROCEDURE — 3078F DIAST BP <80 MM HG: CPT

## 2024-03-26 RX ORDER — METOPROLOL SUCCINATE 25 MG/1
25 TABLET, EXTENDED RELEASE ORAL DAILY
Qty: 90 TABLET | Refills: 3 | Status: SHIPPED | OUTPATIENT
Start: 2024-03-26

## 2024-03-26 RX ORDER — ROSUVASTATIN CALCIUM 40 MG/1
40 TABLET, COATED ORAL
Qty: 90 TABLET | Refills: 3 | Status: SHIPPED | OUTPATIENT
Start: 2024-03-26

## 2024-03-26 RX ORDER — EZETIMIBE 10 MG/1
10 TABLET ORAL
Qty: 100 TABLET | Refills: 3 | Status: SHIPPED | OUTPATIENT
Start: 2024-03-26

## 2024-03-26 ASSESSMENT — ENCOUNTER SYMPTOMS
GASTROINTESTINAL NEGATIVE: 1
PALPITATIONS: 0
ORTHOPNEA: 0
SHORTNESS OF BREATH: 0
NERVOUS/ANXIOUS: 0
MUSCULOSKELETAL NEGATIVE: 1
CONSTITUTIONAL NEGATIVE: 1
NEUROLOGICAL NEGATIVE: 1
PND: 0
EYES NEGATIVE: 1
DEPRESSION: 0

## 2024-03-26 ASSESSMENT — FIBROSIS 4 INDEX: FIB4 SCORE: 2.31

## 2024-03-26 NOTE — PROGRESS NOTES
Chief Complaint   Patient presents with    Coronary Artery Disease     Coronary artery disease due to lipid rich plaque       Subjective     Sarabjit Almendarez is a 68 y.o. male who presents today for follow up. They have a history of CAD s/p CABG in 2005, HTN, HLD.     They are a patient of Dr. Viveros, last seen on 12/7/2021.  At that visit he was doing well over all, no complaints, he had had an echocardiogram and a stress test for some shortness of breath at his previous visit but these were both negative.     Seen by myself on 5/3/2023 since their last visit they have been feeling really well, continues to have no cardiovascular complaints. 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  Today 3/26/2024 doing well from cardiac prospective.  He denies acute cardiovascular symptoms.  He is currently walking 2-4 miles per day.       Past Medical History:   Diagnosis Date    CAD (coronary artery disease)     CABG     Hyperlipidemia     Hypertension     Palpitations     PVC's     Past Surgical History:   Procedure Laterality Date    MULTIPLE CORONARY ARTERY BYPASS      ZZZ CARDIAC CATH       Family History   Problem Relation Age of Onset    Heart Disease Other      Social History     Socioeconomic History    Marital status:      Spouse name: Not on file    Number of children: Not on file    Years of education: Not on file    Highest education level: Not on file   Occupational History    Not on file   Tobacco Use    Smoking status: Never    Smokeless tobacco: Never   Substance and Sexual Activity    Alcohol use: No    Drug use: Never    Sexual activity: Not on file   Other Topics Concern    Not on file   Social History Narrative    Not on file     Social Determinants of Health     Financial Resource Strain: Not on file   Food Insecurity: Not on  "file   Transportation Needs: Not on file   Physical Activity: Not on file   Stress: Not on file   Social Connections: Not on file   Intimate Partner Violence: Not on file   Housing Stability: Not on file     No Known Allergies  Outpatient Encounter Medications as of 3/26/2024   Medication Sig Dispense Refill    ezetimibe (ZETIA) 10 MG Tab Take 1 Tablet by mouth at bedtime. 100 Tablet 3    metoprolol SR (TOPROL XL) 25 MG TABLET SR 24 HR Take 1 Tablet by mouth every day. 90 Tablet 3    rosuvastatin (CRESTOR) 40 MG tablet Take 1 Tablet by mouth at bedtime. 90 Tablet 3    aspirin EC (ECOTRIN) 81 MG TBEC Take 81 mg by mouth every day.      [DISCONTINUED] ezetimibe (ZETIA) 10 MG Tab Take 1 Tablet by mouth at bedtime. 100 Tablet 3    [DISCONTINUED] metoprolol SR (TOPROL XL) 25 MG TABLET SR 24 HR Take 1 Tablet by mouth every day. 90 Tablet 3    [DISCONTINUED] rosuvastatin (CRESTOR) 40 MG tablet Take 1 Tablet by mouth at bedtime. 90 Tablet 3     No facility-administered encounter medications on file as of 3/26/2024.     Review of Systems   Constitutional: Negative.    HENT: Negative.     Eyes: Negative.    Respiratory:  Negative for shortness of breath.    Cardiovascular:  Negative for chest pain, palpitations, orthopnea, leg swelling and PND.   Gastrointestinal: Negative.    Genitourinary: Negative.    Musculoskeletal: Negative.    Skin: Negative.    Neurological: Negative.    Endo/Heme/Allergies: Negative.    Psychiatric/Behavioral:  Negative for depression. The patient is not nervous/anxious.               Objective     /68 (BP Location: Right arm, Patient Position: Sitting, BP Cuff Size: Adult)   Pulse 66   Resp 16   Ht 1.727 m (5' 8\")   Wt 80.3 kg (177 lb)   SpO2 94%   BMI 26.91 kg/m²     Physical Exam  Constitutional:       Appearance: Normal appearance.   HENT:      Head: Normocephalic.   Neck:      Vascular: No JVD.   Cardiovascular:      Rate and Rhythm: Normal rate and regular rhythm.      Pulses: " Normal pulses.      Heart sounds: Normal heart sounds. No murmur heard.     No friction rub.   Pulmonary:      Effort: Pulmonary effort is normal.      Breath sounds: Normal breath sounds.   Abdominal:      Palpations: Abdomen is soft.   Musculoskeletal:         General: Normal range of motion.      Right lower leg: No edema.      Left lower leg: No edema.   Skin:     General: Skin is warm and dry.   Neurological:      General: No focal deficit present.      Mental Status: He is alert and oriented to person, place, and time.   Psychiatric:         Mood and Affect: Mood normal.         Behavior: Behavior normal.            Lab Results   Component Value Date/Time    CHOLSTRLTOT 112 02/20/2021 09:18 AM    LDL 37 02/20/2021 09:18 AM    HDL 45 02/20/2021 09:18 AM    TRIGLYCERIDE 148 02/20/2021 09:18 AM       Lab Results   Component Value Date/Time    SODIUM 142 12/01/2023 10:40 AM    SODIUM 141 02/20/2021 09:18 AM    POTASSIUM 4.3 12/01/2023 10:40 AM    POTASSIUM 4.4 02/20/2021 09:18 AM    CHLORIDE 102 12/01/2023 10:40 AM    CHLORIDE 106 02/20/2021 09:18 AM    CO2 23 12/01/2023 10:40 AM    CO2 28 02/20/2021 09:18 AM    GLUCOSE 92 12/01/2023 10:40 AM    GLUCOSE 84 02/20/2021 09:18 AM    BUN 17 12/01/2023 10:40 AM    BUN 18 02/20/2021 09:18 AM    CREATININE 0.91 12/01/2023 10:40 AM    CREATININE 0.82 02/20/2021 09:18 AM    CREATININE 1.0 08/07/2006 10:20 AM    BUNCREATRAT 19 12/01/2023 10:40 AM    BUNCREATRAT 16 01/21/2015 08:52 AM     Lab Results   Component Value Date/Time    ALKPHOSPHAT 92 12/01/2023 10:40 AM    ALKPHOSPHAT 50 01/26/2018 09:18 AM    ASTSGOT 60 (H) 12/01/2023 10:40 AM    ASTSGOT 24 01/26/2018 09:18 AM    ALTSGPT 65 (H) 12/01/2023 10:40 AM    ALTSGPT 24 01/26/2018 09:18 AM    TBILIRUBIN 1.1 12/01/2023 10:40 AM    TBILIRUBIN 1.4 01/26/2018 09:18 AM          Assessment & Plan     1. Coronary artery disease due to lipid rich plaque  ezetimibe (ZETIA) 10 MG Tab    metoprolol SR (TOPROL XL) 25 MG TABLET SR 24  HR    rosuvastatin (CRESTOR) 40 MG tablet      2. Essential hypertension  ezetimibe (ZETIA) 10 MG Tab    rosuvastatin (CRESTOR) 40 MG tablet      3. Mixed hyperlipidemia  Lipid Profile      4. Premature ventricular contractions  rosuvastatin (CRESTOR) 40 MG tablet      5. Heart palpitations  rosuvastatin (CRESTOR) 40 MG tablet          Medical Decision Making: Today's Assessment/Status/Plan:        Coronary Artery Disease  -Status post bypass in 2005  - continue aspirin 81 mg daily, metoprolol 25 mg daily, rosuvastatin 40 mg daily, Zetia 10 mg daily  We addressed the management of atherosclerotic artery disease.  He is on proper antiplatelet, cholesterol management and beta-blockers as appropriate.  We addressed the potential side effects and laboratory follow-up for these medications.     Hypertension  -Good control  - continue medications as outlined above  - goal < 130/80  - check BP daily 2 hours after taking medication and keep log of measurements      Hyperlipidemia  -Most recent LDL 45  -Continue rosuvastatin and Zetia  -Goal of less than 70  -Check lipid panel      Follow-up with JANAY White in 1 year with annual labs     This note was dictated using Dragon speech recognition software.

## 2024-03-28 DIAGNOSIS — C01 CANCER OF BASE OF TONGUE (HCC): ICD-10-CM

## 2024-03-28 DIAGNOSIS — C09.9 TONSIL CANCER (HCC): ICD-10-CM

## 2024-03-28 PROBLEM — C79.89 METASTATIC SQUAMOUS CELL CARCINOMA TO HEAD AND NECK (HCC): Status: RESOLVED | Noted: 2024-01-29 | Resolved: 2024-03-28

## 2024-03-29 ENCOUNTER — HOSPITAL ENCOUNTER (OUTPATIENT)
Dept: RADIOLOGY | Facility: MEDICAL CENTER | Age: 69
End: 2024-03-29
Attending: OTOLARYNGOLOGY

## 2024-03-29 ENCOUNTER — HOSPITAL ENCOUNTER (OUTPATIENT)
Dept: RADIOLOGY | Facility: MEDICAL CENTER | Age: 69
End: 2024-03-29
Attending: FAMILY MEDICINE

## 2024-03-29 ENCOUNTER — HOSPITAL ENCOUNTER (OUTPATIENT)
Dept: RADIOLOGY | Facility: MEDICAL CENTER | Age: 69
End: 2024-03-29

## 2024-03-29 ENCOUNTER — DOCUMENTATION (OUTPATIENT)
Dept: RADIATION ONCOLOGY | Facility: MEDICAL CENTER | Age: 69
End: 2024-03-29
Payer: MEDICARE

## 2024-03-29 NOTE — PROGRESS NOTES
Nutrition Services: [place] for [facility] Referral  Sarabjit Almendarez is a 68 y.o. male with diagnosis of squamous cell carcinoma of base of tongue. Referral received for nutrition services. Due to high volume of referrals, please allow for up to 14 days for initial RD consultation per policy. RD will attempt to see at next oncology appointment or will contact per policy for nutrition assessment.    Please contact as needed.  [phone]

## 2024-04-01 ENCOUNTER — TELEPHONE (OUTPATIENT)
Dept: CARDIOLOGY | Facility: MEDICAL CENTER | Age: 69
End: 2024-04-01
Payer: MEDICARE

## 2024-04-01 ASSESSMENT — LIFESTYLE VARIABLES
SMOKING_STATUS: NO
TOBACCO_USE: NO

## 2024-04-01 NOTE — TELEPHONE ENCOUNTER
Caller: Sarabjit Almendarez    Topic/issue: Patient was experiencing chest pains yesterday, 03/31/2024. Patient states it was off and on most of the day, at one specific spot. He massaged it but states it felt swollen. Patient states no other symptoms. Patient states he is feeling some chest pain this morning, very slightly. Patient would like a call back to discuss.    Callback Number: [phone]    Thank you,  Tracie PLUNKETT

## 2024-04-01 NOTE — TELEPHONE ENCOUNTER
"Phone Number Called: [phone]    Call outcome: Spoke to patient regarding message below.    Message: Spoke to patient about chest pain he is experiencing. Chest pain is located left side of chest. Patient reports that pain feels tender to the touch and almost feels \"swollen\". Patient reports that the pain worsens with a deep breathe. Patient denies any shortness of breathe at this time. Patient reports BP today 123/75. Patient denies pain radiating to arms or jaw at this time. Patient denies any nausea with pain. Patient reports that massaging where pain was helped to alleviate discomfort. RN advised that she would reach out to provider for further recommendations. Patient educated on ER precautions at this time. All questions answered at this time. Advised to call back with any further questions or concerns.     To LH: please advise on chest pain, thank you!     "

## 2024-04-02 ENCOUNTER — HOSPITAL ENCOUNTER (OUTPATIENT)
Dept: RADIATION ONCOLOGY | Facility: MEDICAL CENTER | Age: 69
End: 2024-04-02
Attending: RADIOLOGY
Payer: MEDICARE

## 2024-04-02 ENCOUNTER — APPOINTMENT (OUTPATIENT)
Dept: HEMATOLOGY ONCOLOGY | Facility: MEDICAL CENTER | Age: 69
End: 2024-04-02
Payer: MEDICARE

## 2024-04-02 ENCOUNTER — HOSPITAL ENCOUNTER (OUTPATIENT)
Dept: HEMATOLOGY ONCOLOGY | Facility: MEDICAL CENTER | Age: 69
End: 2024-04-02
Attending: STUDENT IN AN ORGANIZED HEALTH CARE EDUCATION/TRAINING PROGRAM
Payer: MEDICARE

## 2024-04-02 ENCOUNTER — HOSPITAL ENCOUNTER (OUTPATIENT)
Dept: RADIATION ONCOLOGY | Facility: MEDICAL CENTER | Age: 69
End: 2024-04-02

## 2024-04-02 VITALS
TEMPERATURE: 99.3 F | SYSTOLIC BLOOD PRESSURE: 104 MMHG | BODY MASS INDEX: 26.76 KG/M2 | WEIGHT: 176.59 LBS | HEART RATE: 81 BPM | HEIGHT: 68 IN | DIASTOLIC BLOOD PRESSURE: 60 MMHG | OXYGEN SATURATION: 98 %

## 2024-04-02 VITALS
RESPIRATION RATE: 16 BRPM | OXYGEN SATURATION: 95 % | DIASTOLIC BLOOD PRESSURE: 76 MMHG | HEIGHT: 66 IN | TEMPERATURE: 98.4 F | SYSTOLIC BLOOD PRESSURE: 131 MMHG | HEART RATE: 70 BPM | WEIGHT: 173.8 LBS | BODY MASS INDEX: 27.93 KG/M2

## 2024-04-02 DIAGNOSIS — C01 SQUAMOUS CELL CARCINOMA OF BASE OF TONGUE (HCC): ICD-10-CM

## 2024-04-02 PROCEDURE — 31575 DIAGNOSTIC LARYNGOSCOPY: CPT | Performed by: RADIOLOGY

## 2024-04-02 PROCEDURE — 99205 OFFICE O/P NEW HI 60 MIN: CPT | Performed by: STUDENT IN AN ORGANIZED HEALTH CARE EDUCATION/TRAINING PROGRAM

## 2024-04-02 PROCEDURE — 77470 SPECIAL RADIATION TREATMENT: CPT | Mod: 26 | Performed by: RADIOLOGY

## 2024-04-02 PROCEDURE — 77334 RADIATION TREATMENT AID(S): CPT | Mod: 26 | Performed by: RADIOLOGY

## 2024-04-02 PROCEDURE — 77334 RADIATION TREATMENT AID(S): CPT | Performed by: RADIOLOGY

## 2024-04-02 PROCEDURE — 99214 OFFICE O/P EST MOD 30 MIN: CPT | Mod: 25 | Performed by: RADIOLOGY

## 2024-04-02 PROCEDURE — 77263 THER RADIOLOGY TX PLNG CPLX: CPT | Performed by: RADIOLOGY

## 2024-04-02 PROCEDURE — 99212 OFFICE O/P EST SF 10 MIN: CPT | Performed by: STUDENT IN AN ORGANIZED HEALTH CARE EDUCATION/TRAINING PROGRAM

## 2024-04-02 PROCEDURE — 77290 THER RAD SIMULAJ FIELD CPLX: CPT | Performed by: RADIOLOGY

## 2024-04-02 PROCEDURE — 77470 SPECIAL RADIATION TREATMENT: CPT | Performed by: RADIOLOGY

## 2024-04-02 PROCEDURE — 99205 OFFICE O/P NEW HI 60 MIN: CPT | Mod: 25 | Performed by: RADIOLOGY

## 2024-04-02 RX ORDER — SODIUM CHLORIDE 9 MG/ML
500 INJECTION, SOLUTION INTRAVENOUS ONCE
Status: CANCELLED | OUTPATIENT
Start: 2024-04-08

## 2024-04-02 RX ORDER — 0.9 % SODIUM CHLORIDE 0.9 %
3 VIAL (ML) INJECTION PRN
Status: CANCELLED | OUTPATIENT
Start: 2024-04-08

## 2024-04-02 RX ORDER — ONDANSETRON 4 MG/1
4 TABLET, FILM COATED ORAL EVERY 4 HOURS PRN
Qty: 30 TABLET | Refills: 6 | Status: SHIPPED | OUTPATIENT
Start: 2024-04-02

## 2024-04-02 RX ORDER — DIPHENHYDRAMINE HYDROCHLORIDE 50 MG/ML
50 INJECTION INTRAMUSCULAR; INTRAVENOUS PRN
Status: CANCELLED | OUTPATIENT
Start: 2024-04-08

## 2024-04-02 RX ORDER — EPINEPHRINE 1 MG/ML(1)
0.5 AMPUL (ML) INJECTION PRN
Status: CANCELLED | OUTPATIENT
Start: 2024-04-08

## 2024-04-02 RX ORDER — 0.9 % SODIUM CHLORIDE 0.9 %
VIAL (ML) INJECTION PRN
Status: CANCELLED | OUTPATIENT
Start: 2024-04-07

## 2024-04-02 RX ORDER — 0.9 % SODIUM CHLORIDE 0.9 %
10 VIAL (ML) INJECTION PRN
Status: CANCELLED | OUTPATIENT
Start: 2024-04-08

## 2024-04-02 RX ORDER — 0.9 % SODIUM CHLORIDE 0.9 %
3 VIAL (ML) INJECTION PRN
Status: CANCELLED | OUTPATIENT
Start: 2024-04-07

## 2024-04-02 RX ORDER — 0.9 % SODIUM CHLORIDE 0.9 %
10 VIAL (ML) INJECTION PRN
Status: CANCELLED | OUTPATIENT
Start: 2024-04-07

## 2024-04-02 RX ORDER — ONDANSETRON 8 MG/1
8 TABLET, ORALLY DISINTEGRATING ORAL PRN
Status: CANCELLED | OUTPATIENT
Start: 2024-04-08

## 2024-04-02 RX ORDER — PROCHLORPERAZINE MALEATE 10 MG
10 TABLET ORAL EVERY 6 HOURS PRN
Status: CANCELLED | OUTPATIENT
Start: 2024-04-08

## 2024-04-02 RX ORDER — SODIUM CHLORIDE 9 MG/ML
INJECTION, SOLUTION INTRAVENOUS CONTINUOUS
Status: CANCELLED | OUTPATIENT
Start: 2024-04-08

## 2024-04-02 RX ORDER — ONDANSETRON 2 MG/ML
4 INJECTION INTRAMUSCULAR; INTRAVENOUS PRN
Status: CANCELLED | OUTPATIENT
Start: 2024-04-08

## 2024-04-02 RX ORDER — LIDOCAINE HYDROCHLORIDE 20 MG/ML
SOLUTION OROPHARYNGEAL
Qty: 100 ML | Refills: 1 | Status: SHIPPED | OUTPATIENT
Start: 2024-04-02

## 2024-04-02 RX ORDER — METHYLPREDNISOLONE SODIUM SUCCINATE 125 MG/2ML
125 INJECTION, POWDER, LYOPHILIZED, FOR SOLUTION INTRAMUSCULAR; INTRAVENOUS PRN
Status: CANCELLED | OUTPATIENT
Start: 2024-04-08

## 2024-04-02 RX ORDER — PROCHLORPERAZINE MALEATE 10 MG
10 TABLET ORAL EVERY 6 HOURS PRN
Qty: 30 TABLET | Refills: 6 | Status: SHIPPED | OUTPATIENT
Start: 2024-04-02

## 2024-04-02 RX ORDER — 0.9 % SODIUM CHLORIDE 0.9 %
VIAL (ML) INJECTION PRN
Status: CANCELLED | OUTPATIENT
Start: 2024-04-08

## 2024-04-02 ASSESSMENT — ENCOUNTER SYMPTOMS
ABDOMINAL PAIN: 0
TREMORS: 0
WHEEZING: 0
TINGLING: 0
NECK PAIN: 0
DEPRESSION: 0
BRUISES/BLEEDS EASILY: 0
ORTHOPNEA: 0
SPUTUM PRODUCTION: 0
FEVER: 0
HEADACHES: 0
NAUSEA: 0
FOCAL WEAKNESS: 0
SHORTNESS OF BREATH: 0
DIZZINESS: 0
BLURRED VISION: 0
WEIGHT LOSS: 1
SENSORY CHANGE: 0
COUGH: 0
HEARTBURN: 0
CHILLS: 0
PALPITATIONS: 0
VOMITING: 0
SORE THROAT: 1
MEMORY LOSS: 0

## 2024-04-02 ASSESSMENT — FIBROSIS 4 INDEX
FIB4 SCORE: 2.31
FIB4 SCORE: 2.31

## 2024-04-02 ASSESSMENT — PAIN SCALES - GENERAL: PAINLEVEL: 1=MINIMAL PAIN

## 2024-04-02 NOTE — RADIATION PLANNING NOTES
PATIENT NAME Sarabjit Almendarez   PRIMARY PHYSICIAN Mundo Van [id number]   REFERRING PHYSICIAN No ref. provider found 1955       DATE OF SERVICE: 4/2/2024    DIAGNOSIS:  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 on 3/28/2024  Histopathologic type: Squamous cell carcinoma, NOS  Stage prefix: Initial diagnosis  Residual tumor (R): R0 - None  ECOG performance status: Grade 0  Karnofsky performance status: Score 100  Prognostic indicators: BERTRAM <2mm       SPECIAL TREATMENT PROCEDURE NOTE:  Considerable additional effort required in the management of this case because of administration of concurrent weekly cisplatin chemotherapy which may result in increased normal tissue toxicity. This includes longer and possibly more frequent on treatment visits.

## 2024-04-02 NOTE — RADIATION PLANNING NOTES
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 on 3/28/2024  Histopathologic type: Squamous cell carcinoma, NOS  Stage prefix: Initial diagnosis  Residual tumor (R): R0 - None  ECOG performance status: Grade 0  Karnofsky performance status: Score 100  Prognostic indicators: BERTRAM <2mm         IMAGING REVIEWED:  [x] CT     [] MRI     [x] PET/CT     [] BONE SCAN     [] MAMMO     [] OTHER      TREATMENT INTENT:   [x] CURATIVE     [] MAINTENANCE     []  PALLIATIVE      []  SUPPORTIVE     []  PROPHYLACTIC     [] BENIGN     []  CONSOLIDATIVE      [] DEFINITIVE   []  OLOGIMETASTATIC      LINE OF TREATMENT:  [x] ADJUVANT   [] DEFINITIVE   [] NEOADJUVANT   [] RE-TREATMENT      TECHNIQUE PLANNED:  [x] IMRT   [] 3D   [] SBRT   [] SRS/SRT   [] HDR   [] ELECTRON       IMRT JUSTIFICATION:  []   An immediately adjacent area has been previously irradiated and abutting portals must be established with high precision.    []  Dose escalation is planned to deliver radiation doses in excess of those commonly utilized for similar tumors with conventional treatment.    []  The target volume is concave or convex, and the critical normal tissues are within or around that convexity or concavity.    [x]  The target volume is in close proximity to critical structures that must be protected.    [x]  The volume of interest must be covered with narrow margins to adequately protect  immediately adjacent structures.      FIELDS & BLOCKING:  [] COMPLEX BLOCKS     []  = 3 TX AREAS     [x]  ARCS     []  CUSTOM SHEILD        []  SIMPLE BLOCK      CHEMOTHERAPY:  [x]  CONCURRENT     []  INDUCTION     [] SEQUENTIAL     []  <30 DAYS FROM XRT      NOTES:  OAR CONSTRAINTS: (GUIDELINES ONLY NOT ABSOLUTE)  Target  Prescribed Coverage   PTV1 95% of PTV1 covered by 100% (cGy) of RX Dose     PTV1 99% of PTV1 covered by 93% (cGy) of RX Dose       RAIN Goal   Any volume (1cc) outside PTV Max Dose < 74Gy   Plan Hot Spot Max Dose < 110%   Cord  Max Dose < 45Gy   Cord + 0.5cm Max Dose < 50Gy   Brainstem Max Dose < 52 Gy   Brainstem + 0.5cm                           (0.3cm w/ Dr. True.) Max Dose < 52Gy   Oral Pharynx  Mean Dose < 45Gy    Oral Cavity Mean Dose < 30Gy   R Parotid  Mean Dose < 23Gy    L Parotid  Mean Dose < 23Gy    Cervical Esophagus Mean Dose < 30Gy   Contralateral Submandibular gland (not target)  Max Dose < 39Gy   Optic Chiasm Max Dose < 54Gy   R Optic Nerve Max Dose < 54Gy   L Optic Nerve Max Dose < 54Gy   R Eye Max Dose < 35Gy   L Eye Max Dose < 35Gy   R Lens Max Dose < 10Gy   L Lens Max Dose < 10Gy   Inner Ear Mean Dose < 50Gy    Mandible Goal - 1cc Max Dose < 70Gy   *RTOG 1016, RTOG 0225

## 2024-04-02 NOTE — PROGRESS NOTES
"Patient was seen today in clinic with Dr. Shelby for consult.  Vitals signs and weight were obtained and pain assessment was completed.  Allergies and medications were reviewed with the patient.       Vitals/Pain:  Vitals:    04/02/24 1026   BP: 131/76   Pulse: 70   Resp: 16   Temp: 36.9 °C (98.4 °F)   SpO2: 95%   Weight: 78.8 kg (173 lb 12.8 oz)   Height: 1.676 m (5' 6\")   Pain Score: 1=Minimal Pain        Allergies:   Patient has no known allergies.    Current Medications:  Current Outpatient Medications   Medication Sig Dispense Refill    ondansetron (ZOFRAN) 4 MG Tab tablet Take 1 Tablet by mouth every four hours as needed for Nausea/Vomiting (for nausea, vomiting). 30 Tablet 6    prochlorperazine (COMPAZINE) 10 MG Tab Take 1 Tablet by mouth every 6 hours as needed (for nausea, vomiting). 30 Tablet 6    ezetimibe (ZETIA) 10 MG Tab Take 1 Tablet by mouth at bedtime. 100 Tablet 3    metoprolol SR (TOPROL XL) 25 MG TABLET SR 24 HR Take 1 Tablet by mouth every day. 90 Tablet 3    rosuvastatin (CRESTOR) 40 MG tablet Take 1 Tablet by mouth at bedtime. 90 Tablet 3    aspirin EC (ECOTRIN) 81 MG TBEC Take 81 mg by mouth every day.       No current facility-administered medications for this encounter.         PCP:  Rehan Waldrop R.N.  "

## 2024-04-02 NOTE — PROGRESS NOTES
Consult Note: Hematology/Oncology     Referring Provider: Dr. Heron SUAREZ  Primary Care:  Mundo Van M.D.    Chief Complaint   Patient presents with    New Patient     Cancer of base of tongue        Current Treatment: None    Prior Treatment:     February 27, 2024:  excision of his left glossopharyngeal sulcus muscle and left neck dissection along with dental extraction of tooth 24.     Subjective:   History of Presenting Illness:  Sarabjit Almendarez is a 68 y.o. 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    Patient reports that he was golfing and told a friend about pain on the left side of neck.  Friend is a physician who referred him to Dr. Silverio.  He had a CT of the soft tissue neck on December 19, 2023 which demonstrated left neck level 2 lymph nodes measuring 1.8 x 1.7 cm.  He saw Dr. Silverio who conducted a biopsy of the left cervical lymph node which demonstrated epithelial elements most consistent with squamous cell carcinoma p16 positive.  He was referred to [facility] and saw Dr. Maldonado on January 29, 2024.  He had a PET scan done on February 8, 2024 which demonstrated a mass at the left base of the tongue with 2 metastatic left cervical lymph nodes.    On February 27, 2024 he underwent a an excision of his left glossopharyngeal sulcus muscle and left neck dissection along with dental extraction of tooth 24.  Pathology demonstrated squamous cell carcinoma HPV associated with focal intra tumor all perineural invasion and monica metastases.  There was tumor present at the deep tissue edge 1.5 mm from the posterior mucosal edge.  Additional deep margins of the left constrictor muscle and left thigh localized cysts positive for carcinoma.  1 known level 2 level node positive for metastatic squamous cell    Is currently just over 1 month post surgery.  He reports doing very well and had some dysphagia after surgery and pain but is overall doing well.  He is  nervous for the treatment that is ahead of him.        Presents with Altagracia his wife        Past Medical History:   Diagnosis Date    CAD (coronary artery disease)     CABG     Hyperlipidemia     Hypertension     Palpitations     PVC's        Past Surgical History:   Procedure Laterality Date    MULTIPLE CORONARY ARTERY BYPASS      ZZZ CARDIAC CATH         Social History     Tobacco Use    Smoking status: Never    Smokeless tobacco: Never   Substance Use Topics    Alcohol use: No    Drug use: Never        Family History   Problem Relation Age of Onset    Heart Disease Other        No Known Allergies    Current Outpatient Medications   Medication Sig Dispense Refill    ezetimibe (ZETIA) 10 MG Tab Take 1 Tablet by mouth at bedtime. 100 Tablet 3    metoprolol SR (TOPROL XL) 25 MG TABLET SR 24 HR Take 1 Tablet by mouth every day. 90 Tablet 3    rosuvastatin (CRESTOR) 40 MG tablet Take 1 Tablet by mouth at bedtime. 90 Tablet 3    aspirin EC (ECOTRIN) 81 MG TBEC Take 81 mg by mouth every day.       No current facility-administered medications for this encounter.       Review of Systems   Constitutional:  Positive for malaise/fatigue and weight loss. Negative for chills and fever.   HENT:  Positive for sore throat. Negative for congestion, ear pain and nosebleeds.    Eyes:  Negative for blurred vision.   Respiratory:  Negative for cough, sputum production, shortness of breath and wheezing.    Cardiovascular:  Negative for chest pain, palpitations, orthopnea and leg swelling.   Gastrointestinal:  Negative for abdominal pain, heartburn, nausea and vomiting.   Genitourinary:  Negative for dysuria, frequency and urgency.   Musculoskeletal:  Negative for neck pain.   Neurological:  Negative for dizziness, tingling, tremors, sensory change, focal weakness and headaches.   Endo/Heme/Allergies:  Does not bruise/bleed easily.   Psychiatric/Behavioral:  Negative for depression, memory loss and suicidal ideas.    All other systems  "reviewed and are negative.      Problem list, medications, and allergies reviewed by myself today in Epic.     Objective:     Vitals:    04/02/24 0828   BP: 104/60   BP Location: Left arm   Patient Position: Sitting   BP Cuff Size: Adult   Pulse: 81   Temp: 37.4 °C (99.3 °F)   TempSrc: Temporal   SpO2: 98%   Weight: 80.1 kg (176 lb 9.4 oz)   Height: 1.727 m (5' 7.99\")       DESC; KARNOFSKY SCALE WITH ECOG EQUIVALENT: 90, Able to carry on normal activity; minor signs or symptoms of disease (ECOG equivalent 0)    DISTRESS LEVEL: no apparent distress    Physical Exam  Constitutional:       General: He is not in acute distress.     Appearance: Normal appearance.   HENT:      Head: Normocephalic and atraumatic.      Nose: Nose normal. No congestion.      Mouth/Throat:      Mouth: Mucous membranes are moist.      Pharynx: Oropharynx is clear.   Eyes:      General: No scleral icterus.     Conjunctiva/sclera: Conjunctivae normal.      Pupils: Pupils are equal, round, and reactive to light.   Neck:      Comments: L scar, well healing   Cardiovascular:      Rate and Rhythm: Normal rate and regular rhythm.      Pulses: Normal pulses.      Heart sounds: No murmur heard.     No friction rub.   Pulmonary:      Effort: Pulmonary effort is normal. No respiratory distress.      Breath sounds: Normal breath sounds. No stridor. No wheezing or rales.   Chest:      Chest wall: No tenderness.   Abdominal:      General: Abdomen is flat. Bowel sounds are normal. There is no distension.      Palpations: Abdomen is soft. There is no mass.      Tenderness: There is no abdominal tenderness. There is no guarding or rebound.   Musculoskeletal:         General: No swelling, tenderness or deformity. Normal range of motion.      Cervical back: Normal range of motion and neck supple. No rigidity or tenderness.      Right lower leg: No edema.      Left lower leg: No edema.   Lymphadenopathy:      Cervical: No cervical adenopathy.   Skin:     General: " Skin is warm.      Coloration: Skin is not jaundiced or pale.      Findings: No bruising or rash.   Neurological:      General: No focal deficit present.      Mental Status: He is alert and oriented to person, place, and time. Mental status is at baseline.      Motor: No weakness.   Psychiatric:         Mood and Affect: Mood normal.         Behavior: Behavior normal.         Thought Content: Thought content normal.         Judgment: Judgment normal.         Labs:   Most recent labs reviewed.  Please see the lab tab of chart review    Imaging:   Most recent images below have been independently reviewed by me.  Please see the imaging tab of chart review    Pathology:  2/27/24  A. 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 on 3/28/2024       Mr. Almendarez is a 67 yo M presents with stage I HPV mediated oropharyngeal carcinoma.    Today had a long discussion with the patient. We discussed his stage as well as the type of cancer he has.  Based on the new a [facility], patient has stage 1 oropharngeal carcinoma. We discussed the natural history of squamous cell carcinoma of the oropharyngeal.  We also went over the NCCN guidelines regarding treatment options going forward.      We discussed that treatment options include a resection of primary and ipsilateral or bilateral neck dissection.  We discussed adjuvant concurrent systemic chemotherapy with radiation.  Today I showed the patient to the NCCN guidelines for cancer of the oropharynx.    After consideration, the patient wishes to undergo concurrent chemoradiation.    Today we discussed the role of  chemotherapy as well as the logistics and side effects of the medication. We discussed that the side effects of cisplatin include but are not limited to blood in stools and urine; burning, numbness and tingling; change in frequency of urination; cough or hoarseness; shortness of breath; dizziness; drowsiness; ringing in the eats; loss of appetite; loss of balance; loss of hearing; back/side pain; NVD; weakness.    We then moved on to talk about prognosis.  I informed the patient that the treatment will be done with a curative intent.  We discussed that a prophylactic G-tube can prevent weight loss and ensure the patient gets proper nutrition throughout this treatment.  Today we also discussed the importance of nutrition and maintaining adequate caloric and fluid intake.    We also discussed that a nutritionist is necessary to go over a proper food that he should be eating during this treatment.    We also discussed port placement.  Patient does not want to want to proceed with this option at this time.     We also discussed that dental work should be performed prior to the beginning of treatment. Ideally he will have dental visit with dental trays for fluoride treatment.    Plan  -Start concurrent chemo/RT, start date 4/8/24  -will need chemo education  -G tube referral deferred for now  -nutritionist referral  -patient to see me 1 week after start on concurrent chemo RT for tox check    Chemo Regimen  7 day cycle for 6 cycles with concurrent RT     Cisplatin 40mg/m2 IV over 60 minutes on Day 1     References   NCCN Guidelines for HN Cancer V.1.2021  Shruthi FRANK et al. Radiother oncol 2006;79(1):34-38  Milton GUEVARA et al. HN 2005;27(1):36-43      No follow-ups on file.     Any questions and concerns raised by the patient were addressed and answered. Patient denies any further questions.  Patient encouraged to call the office with any concerns or issues.     Josefina Geronimo M.D.  Hematology/Oncology

## 2024-04-02 NOTE — CT SIMULATION
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 on 3/28/2024  Histopathologic type: Squamous cell carcinoma, NOS  Stage prefix: Initial diagnosis  Residual tumor (R): R0 - None  ECOG performance status: Grade 0  Karnofsky performance status: Score 100  Prognostic indicators: BERTRAM <2mm         Treatment Planning CT Simulation        Order Questions       Question Answer    Is this for a new course of treatment? Yes    Is this an Addendum? No    Implanted Device/Pacemaker No    Simulation Status Initial    Planned Start Date 4/9/2024    Treatment Site Oropharynx - Base of tongue    Laterality Bilateral    Treatment Technique IMRT    Treatment Pattern/Frequency Daily    Number of fractions: 33    Concurrent Chemotherapy Yes    Ordering Provider ALEJANDRO YADAV    CT Technique 3D    Slice Thickness 2mm    Contrast IV    IV Contrast Volume Other    Volume (cc) 100    Bowel Preparation No    Treatment Device(s) S-Frame Mask     Shoulder Pulls     Bolus 0.5 cm    Treatment Marker Scar    Patient Attire Gown    Patient Position Supine    Patient Orientation Head First    Arm Position Down    Dentures Out    Chin Position Neutral    Treatment Machine No preference    Treatment Image Guidance CBCT    Frequency (CBCT) Daily    Image Guidance Match Bone    In Vivo Dosimetry TLD    Other Orders Weekly Physics Check     Special Tx Procedure    Release to patient Immediate

## 2024-04-02 NOTE — RADIATION PLANNING NOTES
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 on 3/28/2024  Histopathologic type: Squamous cell carcinoma, NOS  Stage prefix: Initial diagnosis  Residual tumor (R): R0 - None  ECOG performance status: Grade 0  Karnofsky performance status: Score 100  Prognostic indicators: BERTRAM <2mm       DATE OF SERVICE: 4/2/2024    TYPE OF SIMULATION: Head & Neck    GOAL OF TREATMENT:   [x] Curative  [] Palliative  [] Oligometastatic    CONTRAST:    [x] IV Contrast*  [] Oral Contrast               POSITION:    [x]  Supine  [] Prone     COMPLEX:  [] Complex Blocking   [x]Arcs  [] Custom Blocks  [] >3 Sites    PROCEDURE: Patient place in supine position on CT table with head in neutral position. Dentures removed. Shoulder pulls used to stabilize shoulders. Bolus 0.5 cm placed on left neck.  Patient head and shoulders were immobilized with a thermoplastic mask.   films evaluated to ensure proper patient position.  CT obtained through entire volume of interest. Exam pushed to treatment planning system for contouring and planning.    I have personally reviewed the relevant data, performed the target localization, and determined all relevant factors for this patient’s simulation.    *Omnipaque 80 -100cc IVP in conjunction with 500cc NS

## 2024-04-02 NOTE — NON-PROVIDER
The following appointments, labs, and medications have been provided to the patient:  Line: PIV  ECHO: TBD  WBC Support (g-csf): TBD  Labs: CBC, CMP, MAG  Follow up appts: 4/12/24  Chemo/immunotherapy class: 4/2/24  Chemotherapy Regimen Head & Neck: Cicplatin + Radiation  Nausea medication: zofran/compazine/Lidocaine prescribed and sent to [facility]  Other treatment specific meds: TBD  Oral chemo follow up: N/A  Pain medication: Per provider discretion  Nurse sonja: Referred on 3/28/24 Established with TBD  Dietician:  YVETTE  SW: YVETTE  FRA: Referred on 4/2/24    Additional info/teaching for immunotherapy patients:  If hospitalized, inform staff of immunotherapy treatment and have them contact oncologist - immunotherapy alert card provided.    Additional info/teaching for chemotherapy patients:  Neutropenia reminder card provided to patient      Additional teaching:  Patient was provided with drug specific handouts and [facility] side effects sheet. Patient verbalized that questions and concerns regarding treatment have been addressed. Consent to proceed with treatment was reviewed and signed during this visit.    Spent 50 minutes of continuous, non-interrupted, face-to-face patient contact in which greater than 50% of the visit was spent counseling and coordinating of care.

## 2024-04-02 NOTE — PROCEDURES
DATE OF SERVICE: 4/2/2024         FIBEROPTIC NASO-PHARYNGOSCOPY NOTE      Flexible fiberoptic exam was performed after anesthesia of left nostril and oropharynx.    Nasopharynx:       R. Eustachian canal opening, torus, fossa of Rosenmuller appear normal  L. Eustachian canal opening, torus, fossa of Rosenmuller appear normal      Oropharynx:      Surgical stitch noted left lateral pharyngeal wall  Base of tongue normal appearing.  Vallecula normal appearing.  Epiglottis normal appearing.  PE folds normal appearing.    Larynx:      R. AE fold, false vocal fold, arytenoid appear normal  L. AE fold, false vocal fold, arytenoid appear normal  R. Cord mobile  L. Cord decreased mobility  R. Piriform sinus appears normal  L. Piriform sinus appears normal      Michael JOHNSON M.D.  Electronically signed by: Michael JOHNSON M.D., 4/2/2024 12:52 PM  [phone]

## 2024-04-02 NOTE — CONSULTS
RADIATION ONCOLOGY CONSULT    Patient name:  Sarabjit Almendarez    Primary Physician:  Mundo aVn M.D. MRN: [id number]  [facility]: [id number]   Referring physician:  Provider, Not In Epic  : 1955, 68 y.o.     DATE OF SERVICE:   2024    IDENTIFICATION:   A 68 y.o. male with  Visit Diagnoses     ICD-10-CM   1. 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 on 3/28/2024  Histopathologic type: Squamous cell carcinoma, NOS  Stage prefix: Initial diagnosis  Residual tumor (R): R0 - None  ECOG performance status: Grade 0  Karnofsky performance status: Score 100  Prognostic indicators: BERTRAM <2mm      He is being seen in consultation at the kind request of Dr. Rosa Conteh.    HISTORY OF PRESENT ILLNESS:   Subjective    68-year-old male,  at [facility].  Non-smoker nondrinker.  Presented with palpable left neck mass 2023.  Denies any dysphagia, odynophagia or otalgia.    CT soft tissue neck 2023 demonstrated left neck level 2 lymph node measuring 1.8 x 1.7 cm.    2024 saw Dr. Silverio core biopsy left cervical lymph node performed demonstrating epithelial elements most consistent with squamous cell carcinoma p16 positive.    Seen by Dr. Maldonado at [place] in consult 2024.  On exam noted to have a 2 cm lesion involving the left glossopharyngeal sulcus along with the left level 2 neck node.    2024 PET/CT scan demonstrated mass left base of tongue with 2 metastatic left cervical lymph nodes.    2024 underwent tors excision of left glossopharyngeal sulcus mass and left neck dissection along with dental extraction of tooth #26.  Pathology demonstrated squamous cell carcinoma HPV associated with focal intratumoral perineural invasion and monica metastasis.  Tumor  present at deep tissue edge 1.5 mm from the posterior mucosal edge.  Additional deep margin left constrictor muscle and left styloglossus positive for carcinoma.  Additional deep margin focal detached tumor.  1 level 2 lymph node positive for metastatic squamous cell carcinoma with extranodal extension present less than 2 mm.  28 additional lymph nodes level 2B3 and 4 negative for metastatic disease.    He is 4 and half weeks postop.  States having some dysphagia post surgery which is improving.  Reports some numbness in his left mandible submandibular region postoperatively.        PAST MEDICAL HISTORY:   Past Medical History:   Diagnosis Date    Arrhythmia     CAD (coronary artery disease)     CABG     Hyperlipidemia     Palpitations     PVC's       PAST SURGICAL HISTORY:  Past Surgical History:   Procedure Laterality Date    MULTIPLE CORONARY ARTERY BYPASS      TONSILLECTOMY Bilateral     ZZZ CARDIAC CATH         CURRENT MEDICATIONS:  Current Outpatient Medications   Medication Sig Dispense Refill    ondansetron (ZOFRAN) 4 MG Tab tablet Take 1 Tablet by mouth every four hours as needed for Nausea/Vomiting (for nausea, vomiting). 30 Tablet 6    prochlorperazine (COMPAZINE) 10 MG Tab Take 1 Tablet by mouth every 6 hours as needed (for nausea, vomiting). 30 Tablet 6    ezetimibe (ZETIA) 10 MG Tab Take 1 Tablet by mouth at bedtime. 100 Tablet 3    metoprolol SR (TOPROL XL) 25 MG TABLET SR 24 HR Take 1 Tablet by mouth every day. 90 Tablet 3    rosuvastatin (CRESTOR) 40 MG tablet Take 1 Tablet by mouth at bedtime. 90 Tablet 3    aspirin EC (ECOTRIN) 81 MG TBEC Take 81 mg by mouth every day.       No current facility-administered medications for this encounter.       ALLERGIES:  Patient has no known allergies.    FAMILY HISTORY:    family history includes Heart Disease in an other family member.    SOCIAL HISTORY:     reports that he has never smoked. He has never used smokeless tobacco. He reports that he does not  "drink alcohol and does not use drugs.  Patient currently lives in [place] with his spouse Altagracia, who accompanies him to this office visit.  He is a  for [employer].    REVIEW OF SYSTEMS:    A complete review of system taken. Pertinent items in HPI.  All others negative.    PHYSICAL EXAM:   PERFORMANCE STATUS:       No data to display                   No data to display              /76   Pulse 70   Temp 36.9 °C (98.4 °F)   Resp 16   Ht 1.676 m (5' 6\")   Wt 78.8 kg (173 lb 12.8 oz)   SpO2 95%   BMI 28.05 kg/m²   Physical Exam  Vitals and nursing note reviewed.   Constitutional:       General: He is not in acute distress.     Appearance: He is well-developed.   HENT:      Head: Normocephalic.      Mouth/Throat:      Mouth: Mucous membranes are moist.      Pharynx: Oropharynx is clear. No oropharyngeal exudate or posterior oropharyngeal erythema.   Eyes:      Conjunctiva/sclera: Conjunctivae normal.      Pupils: Pupils are equal, round, and reactive to light.   Neck:      Comments: Well-healed surgical scar left neck  Cardiovascular:      Rate and Rhythm: Normal rate and regular rhythm.   Pulmonary:      Effort: Pulmonary effort is normal.   Musculoskeletal:         General: No deformity. Normal range of motion.      Cervical back: Normal range of motion and neck supple.   Lymphadenopathy:      Cervical: No cervical adenopathy.   Skin:     General: Skin is warm and dry.      Findings: No erythema.   Neurological:      Mental Status: He is alert and oriented to person, place, and time.      Cranial Nerves: No cranial nerve deficit.      Sensory: No sensory deficit.      Motor: No abnormal muscle tone.      Coordination: Coordination normal.      Deep Tendon Reflexes: Reflexes normal.   Psychiatric:         Behavior: Behavior normal.         Thought Content: Thought content normal.         Judgment: Judgment normal.           FLEXIBLE FIBEROPTIC EXAM:  No visible tumor left glossopharyngeal sulcus.  Some " sutures noted on left lateral pharyngeal wall      LABORATORY DATA:   Lab Results   Component Value Date/Time    WBC 7.9 12/01/2023 10:40 AM    WBC 7.5 02/20/2021 09:18 AM    RBC 5.70 12/01/2023 10:40 AM    RBC 5.56 02/20/2021 09:18 AM    HEMOGLOBIN 16.2 12/01/2023 10:40 AM    HEMOGLOBIN 15.9 02/20/2021 09:18 AM    HEMATOCRIT 48.4 12/01/2023 10:40 AM    HEMATOCRIT 48.4 02/20/2021 09:18 AM    MCV 85 12/01/2023 10:40 AM    MCV 87.1 02/20/2021 09:18 AM    MCH 28.4 12/01/2023 10:40 AM    MCH 28.6 02/20/2021 09:18 AM    MCHC 33.5 12/01/2023 10:40 AM    MCHC 32.9 (L) 02/20/2021 09:18 AM    RDW 12.9 12/01/2023 10:40 AM    RDW 40.2 02/20/2021 09:18 AM    PLATELETCT 222 12/01/2023 10:40 AM    PLATELETCT 209 02/20/2021 09:18 AM    MPV 10.7 02/20/2021 09:18 AM    NEUTSPOLYS 61 12/01/2023 10:40 AM    NEUTSPOLYS 58.80 02/20/2021 09:18 AM    LYMPHOCYTES 19 12/01/2023 10:40 AM    LYMPHOCYTES 25.30 02/20/2021 09:18 AM    MONOCYTES 16 12/01/2023 10:40 AM    MONOCYTES 8.40 02/20/2021 09:18 AM    EOSINOPHILS 3 12/01/2023 10:40 AM    EOSINOPHILS 5.90 02/20/2021 09:18 AM    BASOPHILS 1 12/01/2023 10:40 AM    BASOPHILS 1.10 02/20/2021 09:18 AM      Lab Results   Component Value Date/Time    SODIUM 142 12/01/2023 10:40 AM    SODIUM 141 02/20/2021 09:18 AM    POTASSIUM 4.3 12/01/2023 10:40 AM    POTASSIUM 4.4 02/20/2021 09:18 AM    CHLORIDE 102 12/01/2023 10:40 AM    CHLORIDE 106 02/20/2021 09:18 AM    CO2 23 12/01/2023 10:40 AM    CO2 28 02/20/2021 09:18 AM    GLUCOSE 92 12/01/2023 10:40 AM    GLUCOSE 84 02/20/2021 09:18 AM    BUN 17 12/01/2023 10:40 AM    BUN 18 02/20/2021 09:18 AM    CREATININE 0.91 12/01/2023 10:40 AM    CREATININE 0.82 02/20/2021 09:18 AM    CREATININE 1.0 08/07/2006 10:20 AM    BUNCREATRAT 19 12/01/2023 10:40 AM    BUNCREATRAT 16 01/21/2015 08:52 AM           RADIOLOGY DATA:  No results found.    IMPRESSION:    A 68 y.o. with  Visit Diagnoses     ICD-10-CM   1. 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  Multiple attempts at clear deep margin at primary site.  Final margin clear.  1 positive node with extranodal extension less than 2 mm.  Considering the margin and the extranodal extension, did recommend chemoradiotherapy.  Deferred chemotherapy discussions to Dr. Geronimo.      We discussed IMRT based radiotherapy. This will involve 6600 cGy in 33 treatments delivered over 6.5 weeks to high risk region.  Areas of subclinical disease will receive 5600 cGy.  VMAT therapy will be used.  Treatments would be daily Monday through Friday.     We discussed the acute radiation effects associated with therapy including but not limited to: Xerostomia, dysgeusia, radiation pharyngitis, radiation mucositis, odynophagia , dysphagia, rare risk of osteoradionecrosis, and myelitis.      We discussed the importance of nutrition and maintaining fluid balance during therapy. We also discussed the importance of not having any treatment breaks during therapy.  Have made a referral for oncology nutrition consult.     Measures to ameliorate radiation effects include analgesics, placement of a feeding tube either prophylactic or  on an as needed basis to help with nutrition and fluid during therapy. Weekly nutritionist visits. Speech therapy referral for swallow evaluation and ongoing swallow exercises.     We reviewed the importance of dental  care prior to start of radiotherapy, during and after completion of radiotherapy.  Recommended every 3 month dental visits post completion of therapy.      After a lengthy one hour face-to-face discussion patient is willing to proceed.  He will undergo simulation today with treatment anticipated to start 4/8/2024 approximately 6 weeks postop.  We will coordinate start with Dr. Geronimo     Thank you for the opportunity to participate in his care.  If any questions or comments, please do not hesitate in calling.       Orders Placed This Encounter    Rad Onc Treatment Planning CT Simulation

## 2024-04-02 NOTE — TELEPHONE ENCOUNTER
Phone Number Called: [phone]    Call outcome: Left detailed message for patient. Informed to call back with any additional questions.    Message: Left message informing patient of LH recommendations. Advised that if symptoms continue to schedule appointment in office.

## 2024-04-03 ENCOUNTER — PATIENT OUTREACH (OUTPATIENT)
Dept: ONCOLOGY | Facility: MEDICAL CENTER | Age: 69
End: 2024-04-03
Payer: MEDICARE

## 2024-04-03 NOTE — PROGRESS NOTES
Outbound call to patient to offer TAMAR intake and patient requested a call back later this afternoon.

## 2024-04-03 NOTE — ADDENDUM NOTE
Encounter addended by: Lien Baxter, Med Ass't on: 4/3/2024 8:31 AM   Actions taken: Charge Capture section accepted

## 2024-04-04 ENCOUNTER — TELEPHONE (OUTPATIENT)
Dept: RADIATION ONCOLOGY | Facility: MEDICAL CENTER | Age: 69
End: 2024-04-04
Payer: MEDICARE

## 2024-04-04 PROCEDURE — 77300 RADIATION THERAPY DOSE PLAN: CPT | Performed by: RADIOLOGY

## 2024-04-04 PROCEDURE — 77333 RADIATION TREATMENT AID(S): CPT | Mod: XU | Performed by: RADIOLOGY

## 2024-04-04 PROCEDURE — 77301 RADIOTHERAPY DOSE PLAN IMRT: CPT | Mod: 26 | Performed by: RADIOLOGY

## 2024-04-04 PROCEDURE — 77333 RADIATION TREATMENT AID(S): CPT | Mod: 26,XU | Performed by: RADIOLOGY

## 2024-04-04 PROCEDURE — 77338 DESIGN MLC DEVICE FOR IMRT: CPT | Performed by: RADIOLOGY

## 2024-04-04 PROCEDURE — 77301 RADIOTHERAPY DOSE PLAN IMRT: CPT | Performed by: RADIOLOGY

## 2024-04-04 PROCEDURE — 77300 RADIATION THERAPY DOSE PLAN: CPT | Mod: 26 | Performed by: RADIOLOGY

## 2024-04-04 PROCEDURE — 77338 DESIGN MLC DEVICE FOR IMRT: CPT | Mod: 26 | Performed by: RADIOLOGY

## 2024-04-04 NOTE — TELEPHONE ENCOUNTER
Nutrition Services: RD Telephone Consultation/ New chemoradiation Start  Sarabjit Almendarez is a 68 y.o. male with diagnosis of squamous cell carcinoma of base of tongue     RD called pt to assess current intake, appetite, and nutritional status as starts chemoradiation next week. Pt answers, states is managing better as is further from his surgery. Confirms did see a dietitian while was at [place] and did receive some tips regarding his nutrition.  Roderick has since been able to include more solid food and is working hard to meet nutrition demands. Pt does express concern regarding cholesterol and diet. States has been having some chest tightness, believes it may be related to anxiety and stress with diagnosis. Roderick has communicated with his cardiologist who is not currently concerned. Pt did not express any further nutrition-related questions or concerns at this time.     Dietary intake pattern:  Consuming Ensure Complete 2-3x per day. States has started to include foods such as macaroni and cheese, pasta with chicken, mashed potatoes, cheerios, and banana.     Past Medical History:   Diagnosis Date    Arrhythmia     CAD (coronary artery disease)     CABG     Hyperlipidemia     Palpitations     PVC's       Past Surgical History:   Procedure Laterality Date    MULTIPLE CORONARY ARTERY BYPASS      TONSILLECTOMY Bilateral     ZZZ CARDIAC CATH     DENTAL EXTRACTION(S) N/A 2/27/2024   Performed by Courtney Doshi DDS at [facility]   LEFT SELECTIVE NECK DISSECTION, LEVELS II-IV Left 2/27/2024   Performed by Aquiles Maldonado MD at [facility]   NECK ARTERY MAJOR LIGATION 2/27/2024   Performed by Aquiles Maldonado MD at [facility]   ROBOTIC DA AURELIO SP SINGLE CONSOLE; SECONDARY PROCEDURE N/A 2/27/2024   Performed by Aquiles Maldonado MD at [facility]   TRANSORAL ROBOTIC SURGERY, EXCISION LEFT GLOSSOPHARYNGEAL SULCUS and TONGUE-BASE,  "PHARYNGOPLASTY, LEFT SELECTIVE NECK DISSECTION, LIGATION OF THE LEFT EXTERNAL CAROTID ARTERY AND LINGUAL, FACIAL, AND SUPERIOR THYROID BRANCHES; DIRECT LARYNGOPHARYNGOSCOPY WITH OPERATIVE TELESCOPE; DENTAL EXTRACTIONS X 1 TOOTH # 26 Left 2/27/2024   Performed by Aquiles Maldonado MD at [facility]     Biochemical/ Anthropometric Assessment:  Treatment Regimen: OP Head & Neck CISplatin Weekly + XRT with Dr. Shelby and Dr. Geronimo   Pertinent Labs: AST 60, ALT; A1c 6.1% (LDL <100 in 2021)  Pertinent Meds: zofran, compazine, zetia, metoprolol, crestor  Wt Readings from Last 1 Encounters:   04/02/24 80.1 kg (176 lb 9.4 oz)      Ht Readings from Last 1 Encounters:   04/02/24 1.727 m (5' 7.99\")      BMI Readings from Last 1 Encounters:   04/02/24 26.86 kg/m²   BMI Classification: Overweight   Nutrition-Focused Physical Exam: Unable to assess given telephone encounter     Weight History:  Wt Readings from Last 6 Encounters:   04/02/24 80.1 kg (176 lb 9.4 oz)   04/02/24 78.8 kg (173 lb 12.8 oz)   03/26/24 80.3 kg (177 lb)   05/03/23 83.9 kg (185 lb)   12/07/21 83.1 kg (183 lb 1.6 oz)   02/23/21 82.6 kg (182 lb)     Weight Change/Malnutrition Risk: Presents with potential 3.8 kg loss, this is a 4.5% loss. While this does not meet criteria, though is worth noting.     Interventions:  Introduced self and discussed role of dietitian throughout treatment process   Encouraged continuation of Ensure Complete 2-3x per day. Offered additional tips on additional calories and protein, though pt able to receive some information from previous RD. This RD will follow closely and provide additional information and assistance during course of treatment.   RD able to send contact information through [employer].     Pt reports understanding and was receptive to information provided.   RD available PRN   [phone]   "

## 2024-04-04 NOTE — PROGRESS NOTES
"Pharmacy Chemotherapy Calculations    Dx: Cancer of base of tongue  Cycle 1  Previous treatment = surgery    Protocol: Weekly Cisplatin with concurrent radiation  Cisplatin 40 mg/m2 IV over 60 minutes   7-day cycle for 6 cycles with concurrent radiation  NCCN Guidelines for Head and Neck Cancers V.2.2024.  Shruthi JA, et al. Radiother Oncol. 2006;79(1):34-38.  Milton GUEVARA, et al. Head Neck. 2005;27(1):36-43.  Lexis V, et al. J Clin Oncol. 2018;36(11):9516-7742.  Juma MEI, et al. Int J Radiat Oncol Biol Phys. 1996;36(5):999-1004.    Allergies:  Patient has no known allergies.       /69   Pulse 80   Temp 37.2 °C (99 °F) (Temporal)   Resp 18   Ht 1.7 m (5' 6.93\")   Wt 79 kg (174 lb 2.6 oz)   SpO2 93%   BMI 27.34 kg/m²  Body surface area is 1.93 meters squared.    Labs 4/8/24:  ANC~ 6070 Plt = 246k   Hgb = 15.1     SCr = 0.63 mg/dL CrCl ~ 111 mL/min   K = 4.0  Mag = 2.1    Cisplatin 40 mg/m² x 1.93 m² = 77.2 mg   <10% difference, OK to treat with final dose = 78 mg    Pato Glass, PharmD  "

## 2024-04-04 NOTE — TELEPHONE ENCOUNTER
Caller: Sarabjit Almendarez    Topic/issue: Patient returning Mirian's call and requesting to speak with her. Advised patient that she is out of office today. Please call back at number listed below.    Callback Number: [phone]     Thank you,  Zoe COOPER

## 2024-04-04 NOTE — TELEPHONE ENCOUNTER
Phone number called: [phone]     Call outcome: Spoke with patient and discussed 's recommendations and he states he can feel it differently when he moves his head. He recently had surgery for throat cancer, so advised patient to reach out to his surgeon or managing team to follow up with them as well. ER precautions given for worsening chest pain/new symptoms. All questions/concerns answered at this time, patient appreciative of information given.

## 2024-04-05 ENCOUNTER — PATIENT OUTREACH (OUTPATIENT)
Dept: ONCOLOGY | Facility: MEDICAL CENTER | Age: 69
End: 2024-04-05
Payer: MEDICARE

## 2024-04-05 DIAGNOSIS — Z65.8 PSYCHOSOCIAL DISTRESS: ICD-10-CM

## 2024-04-05 NOTE — PROGRESS NOTES
"Oncology [facility] Specialist Intake    Diagnosis Type: \"Throat Cancer and Squamous cell carcinoma on neck \"  Preferred Language: English  Insurance:medicare & AARP  Dental Insurance:Yes; Last Appointment Date: \" a month a half ago\"  Primary Care Provider Reviewed: yes  Last Appointment Date:\" early February 2024 with Dr. Mundo Van\"  Introduced Sarabjit Almendarez to Oncology Support Services and provided contact information on 4/5/2024 .  Current Barriers Identified Include: None at this time  MyChart Status:Activated  Communication Preferences:\"Any\" Best Contact Number: [phone]  Patient Contact's Reviewed: yes     Outbound call to patient for TAMAR intake. Patient states that he has his spouse and friends as his support system. Educated patient on [employer]/events for Cancer Support Groups & Classes, reviewed the Resource Center, patient agreed to receive TAM[facility] Resource folder by mail that will include JAYLIN Quach's and CORBY Barber's card for contact information. Verified mailing address. Administered Distress Screening, patient scored a nine stating \"The unknown\". Encouraged patient to reach out to Cancer Support Services and if needs arise.     Mailed patient TAMAR Resource folder.    Outcome:  No further needs at this time.          "

## 2024-04-07 NOTE — PROGRESS NOTES
"Pharmacy Chemotherapy Calculation:    Dx: Squamous cell carcinoma of base of tongue, stage I, HPV mediated.          Protocol: CISplatin + XRT     *Dosing Reference*  CISplatin 40 mg/m2 IV over 60 min on Day 1   Repeat weekly x 7 cycles with radiation.     NCCN Guidelines for Head and Neck Cancers. V.3.2024.  [place] AT, et al.J Natl Cancer Inst.2005;97:536-539.  Alexandra G, et al. Suki Oncol.2012;23(2)L427-35.    Allergies:  Patient has no known allergies.     /69   Pulse 80   Temp 37.2 °C (99 °F) (Temporal)   Resp 18   Ht 1.7 m (5' 6.93\")   Wt 79 kg (174 lb 2.6 oz)   SpO2 93%   BMI 27.34 kg/m²  Body surface area is 1.93 meters squared.    Labs 4/8/24:  ANC~ 6070 Plt = 246k   Hgb = 15.1     SCr = 0.63 mg/dL CrCl ~ 111 mL/min (min SCr 0.7 used)  AST/ALT/AP = WNL TBili = 1  Mag = 2.1  K+ = 4    Drug Order   (Drug name, dose, route, IV Fluid & volume, frequency, number of doses) Cycle 1  Previous treatment: N/A     Medication = CISplatin  Base Dose = 40 mg/m2   Calc Dose: Base Dose x 1.93 m² = 77.2 mg  Final Dose = 78 mg  Route = IV  Fluid & Volume =  mL  Admin Duration = Over 60 min           <10% difference, okay to treat with final dose       By my signature below, I confirm this process was performed independently with the BSA and all final chemotherapy dosing calculations congruent. I have reviewed the above chemotherapy order and that my calculation of the final dose and BSA (when applicable) corroborate those calculations of the  pharmacist. Discrepancies of 10% or greater in the written dose have been addressed and documented within the [facility] Progress notes.      Fariba Massey, PharmD, BCOP    "

## 2024-04-08 ENCOUNTER — PATIENT OUTREACH (OUTPATIENT)
Dept: ONCOLOGY | Facility: MEDICAL CENTER | Age: 69
End: 2024-04-08

## 2024-04-08 ENCOUNTER — OUTPATIENT INFUSION SERVICES (OUTPATIENT)
Dept: ONCOLOGY | Facility: MEDICAL CENTER | Age: 69
End: 2024-04-08
Attending: PSYCHIATRY & NEUROLOGY
Payer: MEDICARE

## 2024-04-08 ENCOUNTER — HOSPITAL ENCOUNTER (OUTPATIENT)
Dept: RADIATION ONCOLOGY | Facility: MEDICAL CENTER | Age: 69
End: 2024-04-08

## 2024-04-08 VITALS
TEMPERATURE: 99 F | OXYGEN SATURATION: 93 % | RESPIRATION RATE: 18 BRPM | DIASTOLIC BLOOD PRESSURE: 69 MMHG | HEIGHT: 67 IN | WEIGHT: 174.16 LBS | HEART RATE: 80 BPM | SYSTOLIC BLOOD PRESSURE: 122 MMHG | BODY MASS INDEX: 27.34 KG/M2

## 2024-04-08 DIAGNOSIS — C01 SQUAMOUS CELL CARCINOMA OF BASE OF TONGUE (HCC): ICD-10-CM

## 2024-04-08 LAB
ALBUMIN SERPL BCP-MCNC: 4.6 G/DL (ref 3.2–4.9)
ALBUMIN/GLOB SERPL: 2 G/DL
ALP SERPL-CCNC: 83 U/L (ref 30–99)
ALT SERPL-CCNC: 39 U/L (ref 2–50)
ANION GAP SERPL CALC-SCNC: 12 MMOL/L (ref 7–16)
AST SERPL-CCNC: 30 U/L (ref 12–45)
BASOPHILS # BLD AUTO: 0.7 % (ref 0–1.8)
BASOPHILS # BLD: 0.06 K/UL (ref 0–0.12)
BILIRUB SERPL-MCNC: 1 MG/DL (ref 0.1–1.5)
BUN SERPL-MCNC: 25 MG/DL (ref 8–22)
CALCIUM ALBUM COR SERPL-MCNC: 9.3 MG/DL (ref 8.5–10.5)
CALCIUM SERPL-MCNC: 9.8 MG/DL (ref 8.5–10.5)
CHEMOTHERAPY INFUSION START DATE: NORMAL
CHEMOTHERAPY RECORDS: 2
CHEMOTHERAPY RECORDS: 6600
CHEMOTHERAPY RECORDS: NORMAL
CHEMOTHERAPY RX CANCER: NORMAL
CHLORIDE SERPL-SCNC: 105 MMOL/L (ref 96–112)
CO2 SERPL-SCNC: 24 MMOL/L (ref 20–33)
CREAT SERPL-MCNC: 0.63 MG/DL (ref 0.5–1.4)
DATE 1ST CHEMO CANCER: NORMAL
EOSINOPHIL # BLD AUTO: 0.2 K/UL (ref 0–0.51)
EOSINOPHIL NFR BLD: 2.5 % (ref 0–6.9)
ERYTHROCYTE [DISTWIDTH] IN BLOOD BY AUTOMATED COUNT: 40 FL (ref 35.9–50)
GFR SERPLBLD CREATININE-BSD FMLA CKD-EPI: 103 ML/MIN/1.73 M 2
GLOBULIN SER CALC-MCNC: 2.3 G/DL (ref 1.9–3.5)
GLUCOSE SERPL-MCNC: 145 MG/DL (ref 65–99)
HCT VFR BLD AUTO: 44.6 % (ref 42–52)
HGB BLD-MCNC: 15.1 G/DL (ref 14–18)
IMM GRANULOCYTES # BLD AUTO: 0.05 K/UL (ref 0–0.11)
IMM GRANULOCYTES NFR BLD AUTO: 0.6 % (ref 0–0.9)
LYMPHOCYTES # BLD AUTO: 1.17 K/UL (ref 1–4.8)
LYMPHOCYTES NFR BLD: 14.5 % (ref 22–41)
MAGNESIUM SERPL-MCNC: 2.1 MG/DL (ref 1.5–2.5)
MCH RBC QN AUTO: 28.8 PG (ref 27–33)
MCHC RBC AUTO-ENTMCNC: 33.9 G/DL (ref 32.3–36.5)
MCV RBC AUTO: 85.1 FL (ref 81.4–97.8)
MONOCYTES # BLD AUTO: 0.54 K/UL (ref 0–0.85)
MONOCYTES NFR BLD AUTO: 6.7 % (ref 0–13.4)
NEUTROPHILS # BLD AUTO: 6.07 K/UL (ref 1.82–7.42)
NEUTROPHILS NFR BLD: 75 % (ref 44–72)
NRBC # BLD AUTO: 0 K/UL
NRBC BLD-RTO: 0 /100 WBC (ref 0–0.2)
OUTPT INFUS CBC COMMENT OICOM: ABNORMAL
PLATELET # BLD AUTO: 246 K/UL (ref 164–446)
PMV BLD AUTO: 10.4 FL (ref 9–12.9)
POTASSIUM SERPL-SCNC: 4 MMOL/L (ref 3.6–5.5)
PROT SERPL-MCNC: 6.9 G/DL (ref 6–8.2)
RAD ONC ARIA COURSE LAST TREATMENT DATE: NORMAL
RAD ONC ARIA COURSE TREATMENT ELAPSED DAYS: NORMAL
RAD ONC ARIA REFERENCE POINT DOSAGE GIVEN TO DATE: 2
RAD ONC ARIA REFERENCE POINT ID: NORMAL
RAD ONC ARIA REFERENCE POINT SESSION DOSAGE GIVEN: 2
RBC # BLD AUTO: 5.24 M/UL (ref 4.7–6.1)
SODIUM SERPL-SCNC: 141 MMOL/L (ref 135–145)
WBC # BLD AUTO: 8.1 K/UL (ref 4.8–10.8)

## 2024-04-08 PROCEDURE — 77280 THER RAD SIMULAJ FIELD SMPL: CPT | Performed by: RADIOLOGY

## 2024-04-08 PROCEDURE — 77386 HCHG IMRT DELIVERY COMPLEX: CPT | Performed by: RADIOLOGY

## 2024-04-08 PROCEDURE — 96367 TX/PROPH/DG ADDL SEQ IV INF: CPT

## 2024-04-08 PROCEDURE — 96413 CHEMO IV INFUSION 1 HR: CPT

## 2024-04-08 PROCEDURE — 700111 HCHG RX REV CODE 636 W/ 250 OVERRIDE (IP): Mod: JZ | Performed by: STUDENT IN AN ORGANIZED HEALTH CARE EDUCATION/TRAINING PROGRAM

## 2024-04-08 PROCEDURE — 96366 THER/PROPH/DIAG IV INF ADDON: CPT

## 2024-04-08 PROCEDURE — 85025 COMPLETE CBC W/AUTO DIFF WBC: CPT

## 2024-04-08 PROCEDURE — 96361 HYDRATE IV INFUSION ADD-ON: CPT

## 2024-04-08 PROCEDURE — 83735 ASSAY OF MAGNESIUM: CPT

## 2024-04-08 PROCEDURE — 80053 COMPREHEN METABOLIC PANEL: CPT

## 2024-04-08 PROCEDURE — 96375 TX/PRO/DX INJ NEW DRUG ADDON: CPT

## 2024-04-08 PROCEDURE — 700105 HCHG RX REV CODE 258: Performed by: STUDENT IN AN ORGANIZED HEALTH CARE EDUCATION/TRAINING PROGRAM

## 2024-04-08 RX ORDER — SODIUM CHLORIDE 9 MG/ML
500 INJECTION, SOLUTION INTRAVENOUS ONCE
Status: COMPLETED | OUTPATIENT
Start: 2024-04-08 | End: 2024-04-08

## 2024-04-08 RX ADMIN — DEXAMETHASONE SODIUM PHOSPHATE 12 MG: 4 INJECTION, SOLUTION INTRA-ARTICULAR; INTRALESIONAL; INTRAMUSCULAR; INTRAVENOUS; SOFT TISSUE at 09:44

## 2024-04-08 RX ADMIN — ONDANSETRON 16 MG: 2 INJECTION INTRAMUSCULAR; INTRAVENOUS at 09:57

## 2024-04-08 RX ADMIN — MAGNESIUM SULFATE HEPTAHYDRATE: 500 INJECTION, SOLUTION INTRAMUSCULAR; INTRAVENOUS at 12:08

## 2024-04-08 RX ADMIN — FOSAPREPITANT 150 MG: 150 INJECTION, POWDER, LYOPHILIZED, FOR SOLUTION INTRAVENOUS at 10:16

## 2024-04-08 RX ADMIN — CISPLATIN 78 MG: 1 INJECTION INTRAVENOUS at 10:58

## 2024-04-08 RX ADMIN — SODIUM CHLORIDE 500 ML: 9 INJECTION, SOLUTION INTRAVENOUS at 08:44

## 2024-04-08 ASSESSMENT — FIBROSIS 4 INDEX: FIB4 SCORE: 2.31

## 2024-04-08 NOTE — PROGRESS NOTES
Chemotherapy Verification - PRIMARY RN      Height = 170 cm  Weight = 79 kg  BSA = 1.93 m2       Medication: Cisplatin  Dose: 40 mg/m2  Calculated Dose: 77.2mg                             (In mg/m2, AUC, mg/kg)         I confirm this process was performed independently with the BSA and all final chemotherapy dosing calculations congruent.  Any discrepancies of 10% or greater have been addressed with the chemotherapy pharmacist. The resolution of the discrepancy has been documented in the EPIC progress notes.

## 2024-04-08 NOTE — PROGRESS NOTES
Oncology nurse navigator met patient in the infusion center and introduced self my role and services.  Patient has spoken to the Community specialists Reyna Yip and she has provided the patient with the support information.  JAYLIN went over to Infusion Center to introduce myself my role and services.  JAYLIN also provided a transportation application for a gas card from the [employer].

## 2024-04-08 NOTE — PROGRESS NOTES
Chemotherapy Verification - SECONDARY RN       Height = 170 cm  Weight = 79 kg  BSA = 1.93 m2       Medication: Cisplatin  Dose: 40 mg/m2  Calculated Dose: 77.2 mg                             (In mg/m2, AUC, mg/kg)     I confirm that this process was performed independently.

## 2024-04-08 NOTE — CT SIMULATION
DATE OF SERVICE: 4/8/2024    Radiation Therapy Episodes       Active Episodes       Radiation Therapy: IMRT (4/9/2024)                   Radiation Treatments         Plan Last Treated On Elapsed Days Fractions Treated Prescribed Fraction Dose (cGy) Prescribed Total Dose (cGy)    L Oroph H,N M 4/8/2024 0 @ [id number] 1 of 33 200 6,600                  Reference Point Last Treated On Elapsed Days Most Recent Session Dose (cGy) Total Dose (cGy)    L Oroph H,N 4/8/2024 0 @ [id number] 200 200                            First Visit Simple Simulation: Called by [employer] machine to verify treatment parameters including:  treatment site, treatment dose, and treatment setup prior to first treatment. Image derived shifts reviewed in all appropriate planes.  Shifts approved.  Patient treated.    I have personally reviewed the relevant data, performed the target localization, and determined all relevant factors for this patient’s simulation.

## 2024-04-08 NOTE — PROGRESS NOTES
Sarabjit arrives to [facility] for Day 1 cycle 1 Cisplatin for carcinoma of base of tongue.   Sarabjit reports receiving chemotherapy education at MD office priot to appoinment.  POC discussed, educational handouts given, questions answered.  Sarabjit verbalizes understanding and agreement.      PIV placed, brisk blood return observed.  Labs collected and reviewed, parameters met for treatment today.  Pre-hydration, pre-medications, cisplatin and post hydration infused as ordered, Sarabjit tolerated well. PIV flushed and removed, gauze and coban to site.  Discharged in NAD, next appointment confirmed.

## 2024-04-09 ENCOUNTER — HOSPITAL ENCOUNTER (OUTPATIENT)
Dept: RADIATION ONCOLOGY | Facility: MEDICAL CENTER | Age: 69
End: 2024-04-09
Payer: MEDICARE

## 2024-04-09 LAB
CHEMOTHERAPY INFUSION START DATE: NORMAL
CHEMOTHERAPY RECORDS: 2
CHEMOTHERAPY RECORDS: 6600
CHEMOTHERAPY RECORDS: NORMAL
CHEMOTHERAPY RX CANCER: NORMAL
DATE 1ST CHEMO CANCER: NORMAL
RAD ONC ARIA COURSE LAST TREATMENT DATE: NORMAL
RAD ONC ARIA COURSE TREATMENT ELAPSED DAYS: NORMAL
RAD ONC ARIA REFERENCE POINT DOSAGE GIVEN TO DATE: 4
RAD ONC ARIA REFERENCE POINT ID: NORMAL
RAD ONC ARIA REFERENCE POINT SESSION DOSAGE GIVEN: 2

## 2024-04-09 PROCEDURE — 77386 HCHG IMRT DELIVERY COMPLEX: CPT | Performed by: RADIOLOGY

## 2024-04-09 PROCEDURE — 77014 PR CT GUIDANCE PLACEMENT RAD THERAPY FIELDS: CPT | Mod: 26 | Performed by: RADIOLOGY

## 2024-04-10 ENCOUNTER — HOSPITAL ENCOUNTER (OUTPATIENT)
Dept: RADIATION ONCOLOGY | Facility: MEDICAL CENTER | Age: 69
End: 2024-04-10
Attending: RADIOLOGY
Payer: MEDICARE

## 2024-04-10 ENCOUNTER — DOCUMENTATION (OUTPATIENT)
Dept: RADIATION ONCOLOGY | Facility: MEDICAL CENTER | Age: 69
End: 2024-04-10
Payer: MEDICARE

## 2024-04-10 ENCOUNTER — HOSPITAL ENCOUNTER (OUTPATIENT)
Dept: RADIATION ONCOLOGY | Facility: MEDICAL CENTER | Age: 69
End: 2024-04-10
Payer: MEDICARE

## 2024-04-10 VITALS
WEIGHT: 179 LBS | HEART RATE: 65 BPM | SYSTOLIC BLOOD PRESSURE: 110 MMHG | BODY MASS INDEX: 28.09 KG/M2 | OXYGEN SATURATION: 93 % | DIASTOLIC BLOOD PRESSURE: 66 MMHG | TEMPERATURE: 99.4 F

## 2024-04-10 LAB
CHEMOTHERAPY INFUSION START DATE: NORMAL
CHEMOTHERAPY RECORDS: 2
CHEMOTHERAPY RECORDS: 6600
CHEMOTHERAPY RECORDS: NORMAL
CHEMOTHERAPY RX CANCER: NORMAL
DATE 1ST CHEMO CANCER: NORMAL
RAD ONC ARIA COURSE LAST TREATMENT DATE: NORMAL
RAD ONC ARIA COURSE TREATMENT ELAPSED DAYS: NORMAL
RAD ONC ARIA REFERENCE POINT DOSAGE GIVEN TO DATE: 6
RAD ONC ARIA REFERENCE POINT ID: NORMAL
RAD ONC ARIA REFERENCE POINT SESSION DOSAGE GIVEN: 2

## 2024-04-10 PROCEDURE — 77014 PR CT GUIDANCE PLACEMENT RAD THERAPY FIELDS: CPT | Mod: 26 | Performed by: RADIOLOGY

## 2024-04-10 PROCEDURE — 77386 HCHG IMRT DELIVERY COMPLEX: CPT | Performed by: RADIOLOGY

## 2024-04-10 PROCEDURE — 77336 RADIATION PHYSICS CONSULT: CPT | Performed by: RADIOLOGY

## 2024-04-10 RX ORDER — 0.9 % SODIUM CHLORIDE 0.9 %
10 VIAL (ML) INJECTION PRN
Status: CANCELLED | OUTPATIENT
Start: 2024-04-15

## 2024-04-10 RX ORDER — ONDANSETRON 8 MG/1
8 TABLET, ORALLY DISINTEGRATING ORAL PRN
Status: CANCELLED | OUTPATIENT
Start: 2024-04-15

## 2024-04-10 RX ORDER — PROCHLORPERAZINE MALEATE 10 MG
10 TABLET ORAL EVERY 6 HOURS PRN
Status: CANCELLED | OUTPATIENT
Start: 2024-04-15

## 2024-04-10 RX ORDER — 0.9 % SODIUM CHLORIDE 0.9 %
VIAL (ML) INJECTION PRN
Status: CANCELLED | OUTPATIENT
Start: 2024-04-14

## 2024-04-10 RX ORDER — SODIUM CHLORIDE 9 MG/ML
INJECTION, SOLUTION INTRAVENOUS CONTINUOUS
Status: CANCELLED | OUTPATIENT
Start: 2024-04-15

## 2024-04-10 RX ORDER — 0.9 % SODIUM CHLORIDE 0.9 %
10 VIAL (ML) INJECTION PRN
Status: CANCELLED | OUTPATIENT
Start: 2024-04-14

## 2024-04-10 RX ORDER — EPINEPHRINE 1 MG/ML(1)
0.5 AMPUL (ML) INJECTION PRN
Status: CANCELLED | OUTPATIENT
Start: 2024-04-15

## 2024-04-10 RX ORDER — METHYLPREDNISOLONE SODIUM SUCCINATE 125 MG/2ML
125 INJECTION, POWDER, LYOPHILIZED, FOR SOLUTION INTRAMUSCULAR; INTRAVENOUS PRN
Status: CANCELLED | OUTPATIENT
Start: 2024-04-15

## 2024-04-10 RX ORDER — 0.9 % SODIUM CHLORIDE 0.9 %
VIAL (ML) INJECTION PRN
Status: CANCELLED | OUTPATIENT
Start: 2024-04-15

## 2024-04-10 RX ORDER — 0.9 % SODIUM CHLORIDE 0.9 %
3 VIAL (ML) INJECTION PRN
Status: CANCELLED | OUTPATIENT
Start: 2024-04-14

## 2024-04-10 RX ORDER — ONDANSETRON 2 MG/ML
4 INJECTION INTRAMUSCULAR; INTRAVENOUS PRN
Status: CANCELLED | OUTPATIENT
Start: 2024-04-15

## 2024-04-10 RX ORDER — 0.9 % SODIUM CHLORIDE 0.9 %
3 VIAL (ML) INJECTION PRN
Status: CANCELLED | OUTPATIENT
Start: 2024-04-15

## 2024-04-10 RX ORDER — SODIUM CHLORIDE 9 MG/ML
500 INJECTION, SOLUTION INTRAVENOUS ONCE
Status: CANCELLED | OUTPATIENT
Start: 2024-04-15

## 2024-04-10 RX ORDER — DIPHENHYDRAMINE HYDROCHLORIDE 50 MG/ML
50 INJECTION INTRAMUSCULAR; INTRAVENOUS PRN
Status: CANCELLED | OUTPATIENT
Start: 2024-04-15

## 2024-04-10 ASSESSMENT — FIBROSIS 4 INDEX: FIB4 SCORE: 1.35

## 2024-04-10 ASSESSMENT — PAIN SCALES - GENERAL: PAINLEVEL: NO PAIN

## 2024-04-10 NOTE — ON TREATMENT VISIT
ON TREATMENT  NOTE  RADIATION ONCOLOGY DEPARTMENT    Patient name:  Sarabjit Almendarez    Primary Physician:  Mundo Van M.D. MRN: [id number]  [facility]: [id number]   Care Team:  Patient Care Team:  Mundo Van M.D. as PCP - General ([facility] Medicine)  Josefina Geronimo M.D. (Medical Oncology)  Philomena Olvias R.N. as Nurse Navigator  Michael JOHNSON M.D. as Radiation Oncologist (Radiation Oncology)  Don Hinkle M.D. ([facility] Medicine)    : 1955, 68 y.o.     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 on 3/28/2024  Histopathologic type: Squamous cell carcinoma, NOS  Stage prefix: Initial diagnosis  Residual tumor (R): R0 - None  ECOG performance status: Grade 0  Karnofsky performance status: Score 100  Prognostic indicators: BERTRAM <2mm      TREATMENT SUMMARY:  Course First Treatment Date 2024  Course Last Treatment Date 04/10/2024  Radiation Therapy Episodes       Active Episodes       Radiation Therapy: IMRT (2024)                   Radiation Treatments         Plan Last Treated On Elapsed Days Fractions Treated Prescribed Fraction Dose (cGy) Prescribed Total Dose (cGy)    L Oroph H,N M 4/10/2024 2 @ [id number] 3 of 33 200 6,600                  Reference Point Last Treated On Elapsed Days Most Recent Session Dose (cGy) Total Dose (cGy)    L Oroph H,N 4/10/2024 2 @ [id number] 200 600                               SUBJECTIVE:  Eating solid food. No N&V.     VITAL SIGNS:      4/10/2024     8:53 AM 2024     8:24 AM 2024    10:26 AM 2024     8:28 AM 3/26/2024     3:36 PM 5/3/2023     8:43 AM 2021     8:43 AM   Vitals   SYSTOLIC 110 122 131 104 102 110 118   DIASTOLIC 66 69 76 60 68 66 80   Pulse 65 80 70 81 66 60 78   Temperature 37.4 °C (99.4 °F) 37.2 °C  "(99 °F) 36.9 °C (98.4 °F) 37.4 °C (99.3 °F)      Respiration  18 16  16 16 14   Weight 179 174.16 173.8 176.59 177 185 183.1   Height  1.7 m (5' 6.93\") 1.676 m (5' 6\") 1.727 m (5' 7.99\") 1.727 m (5' 8\") 1.727 m (5' 8\") 1.702 m (5' 7\")   BMI 28.09 kg/m2 27.34 kg/m2 28.05 kg/m2 26.86 kg/m2 26.91 kg/m2 28.13 kg/m2 28.68 kg/m2   Pulse Oximetry 93 % 93 % 95 % 98 % 94 % 93 % 96 %     KPS: 80, Normal activity with effort; some signs or symptoms of disease (ECOG equivalent 1)  Encounter Vitals  Temperature: 37.4 °C (99.4 °F)  Temp src: Temporal  Blood Pressure : 110/66  Pulse: 65  Pulse Oximetry: 93 %  Weight: 81.2 kg (179 lb)  Pain Score: No pain      4/10/2024     8:53 AM 4/2/2024    10:26 AM   Pain Assessment   Pain Score NO PAIN MINIMAL PAIN   Pain Loc  THROAT          PHYSICAL EXAM:  Physical Exam  Vitals and nursing note reviewed.   Constitutional:       General: He is not in acute distress.     Appearance: He is well-developed.   HENT:      Head: Normocephalic.   Skin:     General: Skin is warm and dry.      Findings: No erythema.   Neurological:      Mental Status: He is alert and oriented to person, place, and time.   Psychiatric:         Behavior: Behavior normal.         Thought Content: Thought content normal.         Judgment: Judgment normal.              4/10/2024     8:54 AM   Toxicity Assessment   Toxicity Assessment Head/Neck   Fatigue (lethargy, malaise, asthenia) None   Radiation Dermatitis None   Rash/desquamation None   Constipation None   Dehydration None   Mouth Dryness Normal   RT Dysphagia-Pharyngeal None   Mucositis None   Salivary Gland Changes Sticky thickened saliva, may have slightly altered taste (e.g. metallic), additional fluids may be required   Stomatitis/Pharyngitis None   Taste Disturbance (dysgeusia) Normal   RT - Pain due to RT None   Cough Absent   Voice changes/stridor/larynx (hoarseness, loss of voice, laryngitis) Normal       CURRENT MEDICATIONS:    Current Outpatient Medications: "     ondansetron (ZOFRAN) 4 MG Tab tablet, Take 1 Tablet by mouth every four hours as needed for Nausea/Vomiting (for nausea, vomiting)., Disp: 30 Tablet, Rfl: 6    prochlorperazine (COMPAZINE) 10 MG Tab, Take 1 Tablet by mouth every 6 hours as needed (for nausea, vomiting)., Disp: 30 Tablet, Rfl: 6    lidocaine (XYLOCAINE) 2 % Solution, OK to mix with 1:1 Mylanta/Maalox - use 5mL solution every 1 hour PRN for oral discomfort, Disp: 100 mL, Rfl: 1    ezetimibe (ZETIA) 10 MG Tab, Take 1 Tablet by mouth at bedtime., Disp: 100 Tablet, Rfl: 3    metoprolol SR (TOPROL XL) 25 MG TABLET SR 24 HR, Take 1 Tablet by mouth every day., Disp: 90 Tablet, Rfl: 3    rosuvastatin (CRESTOR) 40 MG tablet, Take 1 Tablet by mouth at bedtime., Disp: 90 Tablet, Rfl: 3    aspirin EC (ECOTRIN) 81 MG TBEC, Take 81 mg by mouth every day., Disp: , Rfl:     LABORATORY DATA:   Lab Results   Component Value Date/Time    SODIUM 141 04/08/2024 08:48 AM    POTASSIUM 4.0 04/08/2024 08:48 AM    CHLORIDE 105 04/08/2024 08:48 AM    CO2 24 04/08/2024 08:48 AM    GLUCOSE 145 (H) 04/08/2024 08:48 AM    BUN 25 (H) 04/08/2024 08:48 AM    CREATININE 0.63 04/08/2024 08:48 AM    CREATININE 1.0 08/07/2006 10:20 AM    BUNCREATRAT 19 12/01/2023 10:40 AM    BUNCREATRAT 16 01/21/2015 08:52 AM       Lab Results   Component Value Date/Time    WBC 8.1 04/08/2024 08:48 AM    RBC 5.24 04/08/2024 08:48 AM    HEMOGLOBIN 15.1 04/08/2024 08:48 AM    HEMATOCRIT 44.6 04/08/2024 08:48 AM    MCV 85.1 04/08/2024 08:48 AM    MCH 28.8 04/08/2024 08:48 AM    MCHC 33.9 04/08/2024 08:48 AM    PLATELETCT 246 04/08/2024 08:48 AM         RADIOLOGY DATA:  No results found.    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 on 3/28/2024  Prognostic indicators: BERTRAM <2mm      PLAN:  No change in  treatment plan    Disposition:  Treatment plan and imaging reviewed. Questions answered. Continue therapy outlined.     Michael JOHNSON M.D.    No orders of the defined types were placed in this encounter.

## 2024-04-10 NOTE — PROGRESS NOTES
Patient was referred to interdisciplinary Oncology Symptom Management Clinic/Team by ROSSY (JENNIFER) on 4/8/24.  The patient's case was initially presented and discussed on 4/10/24.  The patient will be followed by Symptom Management team.      Started tx 4/8

## 2024-04-11 ENCOUNTER — HOSPITAL ENCOUNTER (OUTPATIENT)
Dept: RADIATION ONCOLOGY | Facility: MEDICAL CENTER | Age: 69
End: 2024-04-11
Payer: MEDICARE

## 2024-04-11 ENCOUNTER — HOSPITAL ENCOUNTER (OUTPATIENT)
Dept: HEMATOLOGY ONCOLOGY | Facility: MEDICAL CENTER | Age: 69
End: 2024-04-11
Attending: FAMILY MEDICINE
Payer: MEDICARE

## 2024-04-11 VITALS
OXYGEN SATURATION: 93 % | TEMPERATURE: 98.3 F | HEART RATE: 71 BPM | DIASTOLIC BLOOD PRESSURE: 66 MMHG | SYSTOLIC BLOOD PRESSURE: 116 MMHG

## 2024-04-11 DIAGNOSIS — C01 SQUAMOUS CELL CARCINOMA OF BASE OF TONGUE (HCC): ICD-10-CM

## 2024-04-11 LAB
CHEMOTHERAPY INFUSION START DATE: NORMAL
CHEMOTHERAPY RECORDS: 2
CHEMOTHERAPY RECORDS: 6600
CHEMOTHERAPY RECORDS: NORMAL
CHEMOTHERAPY RX CANCER: NORMAL
DATE 1ST CHEMO CANCER: NORMAL
RAD ONC ARIA COURSE LAST TREATMENT DATE: NORMAL
RAD ONC ARIA COURSE TREATMENT ELAPSED DAYS: NORMAL
RAD ONC ARIA REFERENCE POINT DOSAGE GIVEN TO DATE: 8
RAD ONC ARIA REFERENCE POINT ID: NORMAL
RAD ONC ARIA REFERENCE POINT SESSION DOSAGE GIVEN: 2

## 2024-04-11 PROCEDURE — 99214 OFFICE O/P EST MOD 30 MIN: CPT | Performed by: FAMILY MEDICINE

## 2024-04-11 PROCEDURE — 77014 PR CT GUIDANCE PLACEMENT RAD THERAPY FIELDS: CPT | Mod: 26 | Performed by: RADIOLOGY

## 2024-04-11 PROCEDURE — 77386 HCHG IMRT DELIVERY COMPLEX: CPT | Performed by: RADIOLOGY

## 2024-04-11 PROCEDURE — 99212 OFFICE O/P EST SF 10 MIN: CPT | Performed by: FAMILY MEDICINE

## 2024-04-12 ENCOUNTER — HOSPITAL ENCOUNTER (OUTPATIENT)
Dept: HEMATOLOGY ONCOLOGY | Facility: MEDICAL CENTER | Age: 69
End: 2024-04-12
Attending: STUDENT IN AN ORGANIZED HEALTH CARE EDUCATION/TRAINING PROGRAM
Payer: MEDICARE

## 2024-04-12 ENCOUNTER — HOSPITAL ENCOUNTER (OUTPATIENT)
Dept: RADIATION ONCOLOGY | Facility: MEDICAL CENTER | Age: 69
End: 2024-04-12

## 2024-04-12 VITALS
WEIGHT: 177.14 LBS | OXYGEN SATURATION: 96 % | TEMPERATURE: 99.9 F | SYSTOLIC BLOOD PRESSURE: 126 MMHG | HEIGHT: 67 IN | HEART RATE: 74 BPM | DIASTOLIC BLOOD PRESSURE: 64 MMHG | BODY MASS INDEX: 27.8 KG/M2

## 2024-04-12 DIAGNOSIS — G47.09 OTHER INSOMNIA: ICD-10-CM

## 2024-04-12 LAB
CHEMOTHERAPY INFUSION START DATE: NORMAL
CHEMOTHERAPY RECORDS: 2
CHEMOTHERAPY RECORDS: 6600
CHEMOTHERAPY RECORDS: NORMAL
CHEMOTHERAPY RX CANCER: NORMAL
DATE 1ST CHEMO CANCER: NORMAL
RAD ONC ARIA COURSE LAST TREATMENT DATE: NORMAL
RAD ONC ARIA COURSE TREATMENT ELAPSED DAYS: NORMAL
RAD ONC ARIA REFERENCE POINT DOSAGE GIVEN TO DATE: 10
RAD ONC ARIA REFERENCE POINT ID: NORMAL
RAD ONC ARIA REFERENCE POINT SESSION DOSAGE GIVEN: 2

## 2024-04-12 PROCEDURE — 99214 OFFICE O/P EST MOD 30 MIN: CPT | Performed by: STUDENT IN AN ORGANIZED HEALTH CARE EDUCATION/TRAINING PROGRAM

## 2024-04-12 PROCEDURE — 77331 SPECIAL RADIATION DOSIMETRY: CPT | Mod: 26 | Performed by: RADIOLOGY

## 2024-04-12 PROCEDURE — 77427 RADIATION TX MANAGEMENT X5: CPT | Performed by: RADIOLOGY

## 2024-04-12 PROCEDURE — 77014 PR CT GUIDANCE PLACEMENT RAD THERAPY FIELDS: CPT | Mod: 26 | Performed by: RADIOLOGY

## 2024-04-12 PROCEDURE — 99212 OFFICE O/P EST SF 10 MIN: CPT | Performed by: STUDENT IN AN ORGANIZED HEALTH CARE EDUCATION/TRAINING PROGRAM

## 2024-04-12 PROCEDURE — 77386 HCHG IMRT DELIVERY COMPLEX: CPT | Performed by: RADIOLOGY

## 2024-04-12 RX ORDER — RAMELTEON 8 MG/1
8 TABLET ORAL NIGHTLY
Qty: 30 TABLET | Refills: 6 | Status: SHIPPED | OUTPATIENT
Start: 2024-04-12

## 2024-04-12 ASSESSMENT — ENCOUNTER SYMPTOMS
HEARTBURN: 0
BRUISES/BLEEDS EASILY: 0
WHEEZING: 0
SENSORY CHANGE: 0
FOCAL WEAKNESS: 0
SHORTNESS OF BREATH: 0
DEPRESSION: 0
CHILLS: 0
TINGLING: 0
NECK PAIN: 0
FEVER: 0
VOMITING: 0
NAUSEA: 0
SORE THROAT: 0
PALPITATIONS: 0
MEMORY LOSS: 0
DIZZINESS: 0
TREMORS: 0
ABDOMINAL PAIN: 0
COUGH: 0
ORTHOPNEA: 0
HEADACHES: 0
BLURRED VISION: 0
WEIGHT LOSS: 1
SPUTUM PRODUCTION: 0

## 2024-04-12 ASSESSMENT — FIBROSIS 4 INDEX: FIB4 SCORE: 1.35

## 2024-04-12 NOTE — PROGRESS NOTES
Consult Note: Hematology/Oncology     Referring Provider: Dr. Heron SUAREZ  Primary Care:  Mundo Van M.D.    Chief Complaint   Patient presents with   • Cancer     Toxicity check        Current Treatment:     4/8/24: C1 Cisplatin  4/15/24: C2 Cisplatin    Prior Treatment:     February 27, 2024:  excision of his left glossopharyngeal sulcus muscle and left neck dissection along with dental extraction of tooth 24.     Subjective:   History of Presenting Illness:  Sarabjit Almendarez is a 68 y.o. 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    Patient reports that he was golfing and told a friend about pain on the left side of neck.  Friend is a physician who referred him to Dr. Silverio.  He had a CT of the soft tissue neck on December 19, 2023 which demonstrated left neck level 2 lymph nodes measuring 1.8 x 1.7 cm.  He saw Dr. Silverio who conducted a biopsy of the left cervical lymph node which demonstrated epithelial elements most consistent with squamous cell carcinoma p16 positive.  He was referred to [facility] and saw Dr. Maldonado on January 29, 2024.  He had a PET scan done on February 8, 2024 which demonstrated a mass at the left base of the tongue with 2 metastatic left cervical lymph nodes.    On February 27, 2024 he underwent a an excision of his left glossopharyngeal sulcus muscle and left neck dissection along with dental extraction of tooth 24.  Pathology demonstrated squamous cell carcinoma HPV associated with focal intra tumor all perineural invasion and monica metastases.  There was tumor present at the deep tissue edge 1.5 mm from the posterior mucosal edge.  Additional deep margins of the left constrictor muscle and left thigh localized cysts positive for carcinoma.  1 known level 2 level node positive for metastatic squamous cell    Is currently just over 1 month post surgery.  He reports doing very well and had some dysphagia after surgery and pain but is  overall doing well.  He is nervous for the treatment that is ahead of him.    Presents with Altagracia his wife      Interval History    He denies tinnitus, no PN.     He notes that he had some heart burn after his chemo but other than that, he is doing well.     He reports being constipated.  He has tried Senakot.      Voiced changed today.  His throat is tight and sore.       Past Medical History:   Diagnosis Date   • Arrhythmia    • CAD (coronary artery disease)     CABG    • Hyperlipidemia    • Palpitations     PVC's        Past Surgical History:   Procedure Laterality Date   • MULTIPLE CORONARY ARTERY BYPASS     • TONSILLECTOMY Bilateral    • ZZZ CARDIAC CATH         Social History     Tobacco Use   • Smoking status: Never   • Smokeless tobacco: Never   Vaping Use   • Vaping Use: Never used   Substance Use Topics   • Alcohol use: No   • Drug use: Never        Family History   Problem Relation Age of Onset   • Heart Disease Other        No Known Allergies    Current Outpatient Medications   Medication Sig Dispense Refill   • ondansetron (ZOFRAN) 4 MG Tab tablet Take 1 Tablet by mouth every four hours as needed for Nausea/Vomiting (for nausea, vomiting). 30 Tablet 6   • prochlorperazine (COMPAZINE) 10 MG Tab Take 1 Tablet by mouth every 6 hours as needed (for nausea, vomiting). 30 Tablet 6   • lidocaine (XYLOCAINE) 2 % Solution OK to mix with 1:1 Mylanta/Maalox - use 5mL solution every 1 hour PRN for oral discomfort 100 mL 1   • ezetimibe (ZETIA) 10 MG Tab Take 1 Tablet by mouth at bedtime. 100 Tablet 3   • metoprolol SR (TOPROL XL) 25 MG TABLET SR 24 HR Take 1 Tablet by mouth every day. 90 Tablet 3   • rosuvastatin (CRESTOR) 40 MG tablet Take 1 Tablet by mouth at bedtime. 90 Tablet 3   • aspirin EC (ECOTRIN) 81 MG TBEC Take 81 mg by mouth every day.       No current facility-administered medications for this encounter.       Review of Systems   Constitutional:  Positive for malaise/fatigue and weight loss. Negative  "for chills and fever.   HENT:  Negative for congestion, ear pain, nosebleeds and sore throat.         Voice changes   Eyes:  Negative for blurred vision.   Respiratory:  Negative for cough, sputum production, shortness of breath and wheezing.    Cardiovascular:  Negative for chest pain, palpitations, orthopnea and leg swelling.   Gastrointestinal:  Negative for abdominal pain, heartburn, nausea and vomiting.   Genitourinary:  Negative for dysuria, frequency and urgency.   Musculoskeletal:  Negative for neck pain.   Neurological:  Negative for dizziness, tingling, tremors, sensory change, focal weakness and headaches.   Endo/Heme/Allergies:  Does not bruise/bleed easily.   Psychiatric/Behavioral:  Negative for depression, memory loss and suicidal ideas.    All other systems reviewed and are negative.      Problem list, medications, and allergies reviewed by myself today in Epic.     Objective:     Vitals:    04/12/24 1415   BP: 126/64   BP Location: Left arm   Patient Position: Sitting   BP Cuff Size: Adult   Pulse: 74   Temp: 37.7 °C (99.9 °F)   TempSrc: Temporal   SpO2: 96%   Weight: 80.3 kg (177 lb 2.2 oz)   Height: 1.7 m (5' 6.93\")       DESC; KARNOFSKY SCALE WITH ECOG EQUIVALENT: 90, Able to carry on normal activity; minor signs or symptoms of disease (ECOG equivalent 0)    DISTRESS LEVEL: no apparent distress    Physical Exam  Constitutional:       General: He is not in acute distress.     Appearance: Normal appearance.   HENT:      Head: Normocephalic and atraumatic.      Nose: Nose normal. No congestion.      Mouth/Throat:      Mouth: Mucous membranes are moist.      Pharynx: Oropharynx is clear.   Eyes:      General: No scleral icterus.     Conjunctiva/sclera: Conjunctivae normal.      Pupils: Pupils are equal, round, and reactive to light.   Neck:      Comments: L scar, well healing   Cardiovascular:      Rate and Rhythm: Normal rate and regular rhythm.      Pulses: Normal pulses.      Heart sounds: No " murmur heard.     No friction rub.   Pulmonary:      Effort: Pulmonary effort is normal. No respiratory distress.      Breath sounds: Normal breath sounds. No stridor. No wheezing or rales.   Chest:      Chest wall: No tenderness.   Abdominal:      General: Abdomen is flat. Bowel sounds are normal. There is no distension.      Palpations: Abdomen is soft. There is no mass.      Tenderness: There is no abdominal tenderness. There is no guarding or rebound.   Musculoskeletal:         General: No swelling, tenderness or deformity. Normal range of motion.      Cervical back: Normal range of motion and neck supple. No rigidity or tenderness.      Right lower leg: No edema.      Left lower leg: No edema.   Lymphadenopathy:      Cervical: No cervical adenopathy.   Skin:     General: Skin is warm.      Coloration: Skin is not jaundiced or pale.      Findings: No bruising or rash.   Neurological:      General: No focal deficit present.      Mental Status: He is alert and oriented to person, place, and time. Mental status is at baseline.      Motor: No weakness.   Psychiatric:         Mood and Affect: Mood normal.         Behavior: Behavior normal.         Thought Content: Thought content normal.         Judgment: Judgment normal.       Labs:   Most recent labs reviewed.  Please see the lab tab of chart review    Imaging:   Most recent images below have been independently reviewed by me.  Please see the imaging tab of chart review    Pathology:  2/27/24  A. 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 on 3/28/2024       Mr. Almendarez is a 67 yo M presents  with stage I HPV mediated oropharyngeal carcinoma.    He is s/p C1 of cisplatin    Plan  -continue with C2, pending labs    Chemo Regimen  7 day cycle for 6 cycles with concurrent RT     Cisplatin 40mg/m2 IV over 60 minutes on Day 1     References   NCCN Guidelines for HN Cancer V.1.2021  Shruthi JA et al. Radiother oncol 2006;79(1):34-38  Milton GUEVARA et al. HN 2005;27(1):36-43    2.  Insomnia  ->30 min onset to fall asleep  -will try melatonin first  -will order ramelteon 8mg      No follow-ups on file.     Any questions and concerns raised by the patient were addressed and answered. Patient denies any further questions.  Patient encouraged to call the office with any concerns or issues.     Josefina Geronimo M.D.  Hematology/Oncology    32 minutes spent on this visit

## 2024-04-14 NOTE — PROGRESS NOTES
"Pharmacy Chemotherapy Calculations    Dx: Cancer of base of tongue  Cycle 2  Previous treatment = C1 4/8/24    Protocol: Weekly Cisplatin with concurrent radiation  Cisplatin 40 mg/m2 IV over 60 minutes   7-day cycle for 6 cycles with concurrent radiation  NCCN Guidelines for Head and Neck Cancers V.2.2024.  Shruthi JA, et al. Radiother Oncol. 2006;79(1):34-38.  Milton GK, et al. Head Neck. 2005;27(1):36-43.  Lexis V, et al. J Clin Oncol. 2018;36(11):0097-9668.  Juma MEI, et al. Int J Radiat Oncol Biol Phys. 1996;36(5):999-1004.    Allergies:  Patient has no known allergies.       /75   Pulse 87   Temp 36.8 °C (98.3 °F) (Temporal)   Resp 17   Ht 1.7 m (5' 6.93\")   Wt 78.5 kg (173 lb 1 oz)   SpO2 97%   BMI 27.16 kg/m²  Body surface area is 1.93 meters squared.    Labs 4/15/24:  ANC~ 7070 Plt = 273k   Hgb = 15.2     SCr = 0.78 mg/dL CrCl ~ 99 mL/min   K = 3.9  Mag = 2.1    Cisplatin 40 mg/m² x 1.93 m² = 77.2 mg   <10% difference, OK to treat with final dose = 78 mg    Pato Glass, PharmD  "

## 2024-04-15 ENCOUNTER — HOSPITAL ENCOUNTER (OUTPATIENT)
Dept: RADIATION ONCOLOGY | Facility: MEDICAL CENTER | Age: 69
End: 2024-04-15

## 2024-04-15 ENCOUNTER — OUTPATIENT INFUSION SERVICES (OUTPATIENT)
Dept: ONCOLOGY | Facility: MEDICAL CENTER | Age: 69
End: 2024-04-15
Attending: STUDENT IN AN ORGANIZED HEALTH CARE EDUCATION/TRAINING PROGRAM
Payer: MEDICARE

## 2024-04-15 VITALS
DIASTOLIC BLOOD PRESSURE: 75 MMHG | BODY MASS INDEX: 27.16 KG/M2 | HEIGHT: 67 IN | HEART RATE: 87 BPM | RESPIRATION RATE: 17 BRPM | WEIGHT: 173.06 LBS | TEMPERATURE: 98.3 F | OXYGEN SATURATION: 97 % | SYSTOLIC BLOOD PRESSURE: 127 MMHG

## 2024-04-15 DIAGNOSIS — C01 SQUAMOUS CELL CARCINOMA OF BASE OF TONGUE (HCC): ICD-10-CM

## 2024-04-15 LAB
ANION GAP SERPL CALC-SCNC: 12 MMOL/L (ref 7–16)
BASOPHILS # BLD AUTO: 0.6 % (ref 0–1.8)
BASOPHILS # BLD: 0.05 K/UL (ref 0–0.12)
BUN SERPL-MCNC: 30 MG/DL (ref 8–22)
CALCIUM SERPL-MCNC: 9.7 MG/DL (ref 8.5–10.5)
CHEMOTHERAPY INFUSION START DATE: NORMAL
CHEMOTHERAPY RECORDS: 2
CHEMOTHERAPY RECORDS: 6600
CHEMOTHERAPY RECORDS: NORMAL
CHEMOTHERAPY RX CANCER: NORMAL
CHLORIDE SERPL-SCNC: 100 MMOL/L (ref 96–112)
CO2 SERPL-SCNC: 24 MMOL/L (ref 20–33)
CREAT SERPL-MCNC: 0.78 MG/DL (ref 0.5–1.4)
DATE 1ST CHEMO CANCER: NORMAL
EOSINOPHIL # BLD AUTO: 0.19 K/UL (ref 0–0.51)
EOSINOPHIL NFR BLD: 2.2 % (ref 0–6.9)
ERYTHROCYTE [DISTWIDTH] IN BLOOD BY AUTOMATED COUNT: 37.5 FL (ref 35.9–50)
GFR SERPLBLD CREATININE-BSD FMLA CKD-EPI: 96 ML/MIN/1.73 M 2
GLUCOSE SERPL-MCNC: 147 MG/DL (ref 65–99)
HCT VFR BLD AUTO: 44.8 % (ref 42–52)
HGB BLD-MCNC: 15.2 G/DL (ref 14–18)
IMM GRANULOCYTES # BLD AUTO: 0.08 K/UL (ref 0–0.11)
IMM GRANULOCYTES NFR BLD AUTO: 0.9 % (ref 0–0.9)
LYMPHOCYTES # BLD AUTO: 0.86 K/UL (ref 1–4.8)
LYMPHOCYTES NFR BLD: 9.9 % (ref 22–41)
MAGNESIUM SERPL-MCNC: 2.1 MG/DL (ref 1.5–2.5)
MCH RBC QN AUTO: 28.5 PG (ref 27–33)
MCHC RBC AUTO-ENTMCNC: 33.9 G/DL (ref 32.3–36.5)
MCV RBC AUTO: 84.1 FL (ref 81.4–97.8)
MONOCYTES # BLD AUTO: 0.45 K/UL (ref 0–0.85)
MONOCYTES NFR BLD AUTO: 5.2 % (ref 0–13.4)
NEUTROPHILS # BLD AUTO: 7.07 K/UL (ref 1.82–7.42)
NEUTROPHILS NFR BLD: 81.2 % (ref 44–72)
NRBC # BLD AUTO: 0 K/UL
NRBC BLD-RTO: 0 /100 WBC (ref 0–0.2)
OUTPT INFUS CBC COMMENT OICOM: ABNORMAL
PLATELET # BLD AUTO: 273 K/UL (ref 164–446)
PMV BLD AUTO: 10.3 FL (ref 9–12.9)
POTASSIUM SERPL-SCNC: 3.9 MMOL/L (ref 3.6–5.5)
RAD ONC ARIA COURSE LAST TREATMENT DATE: NORMAL
RAD ONC ARIA COURSE TREATMENT ELAPSED DAYS: NORMAL
RAD ONC ARIA REFERENCE POINT DOSAGE GIVEN TO DATE: 12
RAD ONC ARIA REFERENCE POINT ID: NORMAL
RAD ONC ARIA REFERENCE POINT SESSION DOSAGE GIVEN: 2
RBC # BLD AUTO: 5.33 M/UL (ref 4.7–6.1)
SODIUM SERPL-SCNC: 136 MMOL/L (ref 135–145)
WBC # BLD AUTO: 8.7 K/UL (ref 4.8–10.8)

## 2024-04-15 PROCEDURE — 80048 BASIC METABOLIC PNL TOTAL CA: CPT

## 2024-04-15 PROCEDURE — 85025 COMPLETE CBC W/AUTO DIFF WBC: CPT

## 2024-04-15 PROCEDURE — 96375 TX/PRO/DX INJ NEW DRUG ADDON: CPT

## 2024-04-15 PROCEDURE — 96413 CHEMO IV INFUSION 1 HR: CPT

## 2024-04-15 PROCEDURE — 700105 HCHG RX REV CODE 258: Performed by: NURSE PRACTITIONER

## 2024-04-15 PROCEDURE — 96367 TX/PROPH/DG ADDL SEQ IV INF: CPT

## 2024-04-15 PROCEDURE — 96361 HYDRATE IV INFUSION ADD-ON: CPT

## 2024-04-15 PROCEDURE — 77386 HCHG IMRT DELIVERY COMPLEX: CPT | Performed by: RADIOLOGY

## 2024-04-15 PROCEDURE — 83735 ASSAY OF MAGNESIUM: CPT

## 2024-04-15 PROCEDURE — 96366 THER/PROPH/DIAG IV INF ADDON: CPT

## 2024-04-15 PROCEDURE — 700111 HCHG RX REV CODE 636 W/ 250 OVERRIDE (IP): Performed by: NURSE PRACTITIONER

## 2024-04-15 PROCEDURE — 77014 PR CT GUIDANCE PLACEMENT RAD THERAPY FIELDS: CPT | Mod: 26 | Performed by: RADIOLOGY

## 2024-04-15 RX ORDER — SODIUM CHLORIDE 9 MG/ML
500 INJECTION, SOLUTION INTRAVENOUS ONCE
Status: COMPLETED | OUTPATIENT
Start: 2024-04-15 | End: 2024-04-15

## 2024-04-15 RX ADMIN — FOSAPREPITANT 150 MG: 150 INJECTION, POWDER, LYOPHILIZED, FOR SOLUTION INTRAVENOUS at 11:03

## 2024-04-15 RX ADMIN — MAGNESIUM SULFATE HEPTAHYDRATE: 500 INJECTION, SOLUTION INTRAMUSCULAR; INTRAVENOUS at 12:50

## 2024-04-15 RX ADMIN — ONDANSETRON 16 MG: 2 INJECTION INTRAMUSCULAR; INTRAVENOUS at 10:46

## 2024-04-15 RX ADMIN — DEXAMETHASONE SODIUM PHOSPHATE 12 MG: 4 INJECTION, SOLUTION INTRA-ARTICULAR; INTRALESIONAL; INTRAMUSCULAR; INTRAVENOUS; SOFT TISSUE at 10:34

## 2024-04-15 RX ADMIN — SODIUM CHLORIDE 500 ML: 9 INJECTION, SOLUTION INTRAVENOUS at 09:22

## 2024-04-15 RX ADMIN — CISPLATIN 78 MG: 1 INJECTION INTRAVENOUS at 11:45

## 2024-04-15 ASSESSMENT — ENCOUNTER SYMPTOMS
BLURRED VISION: 0
DEPRESSION: 0
HEADACHES: 0
SORE THROAT: 1
VOMITING: 0
INSOMNIA: 1
BACK PAIN: 0
FEVER: 0
CHILLS: 0
PALPITATIONS: 0
NAUSEA: 0
WEIGHT LOSS: 0
SHORTNESS OF BREATH: 0
CONSTIPATION: 0
COUGH: 0
NERVOUS/ANXIOUS: 0
BRUISES/BLEEDS EASILY: 0
DIARRHEA: 0

## 2024-04-15 ASSESSMENT — FIBROSIS 4 INDEX: FIB4 SCORE: 1.35

## 2024-04-15 NOTE — ASSESSMENT & PLAN NOTE
Patient with squamous cell carcinoma stage one of the base of tongue.  Did recently have neck dissection.  He is at the start of his chemo and radiation regimen.  Did discuss what to anticipate in the coming weeks.  No current need for prescription pain medications.  Will plan to follow-up as he begins cycle 2 or 3 to reevaluate his symptoms and how best palliative care could assist him.

## 2024-04-15 NOTE — PROGRESS NOTES
Chemotherapy Verification - PRIMARY RN      Height = 170cm  Weight = 78.5kg  BSA = 1.93       Medication: Cisplatin  Dose: 40mg/m2  Calculated Dose: 77.2mg                             (In mg/m2, AUC, mg/kg)       I confirm this process was performed independently with the BSA and all final chemotherapy dosing calculations congruent.  Any discrepancies of 10% or greater have been addressed with the chemotherapy pharmacist. The resolution of the discrepancy has been documented in the EPIC progress notes.

## 2024-04-15 NOTE — PROGRESS NOTES
Pt arrived ambulatory accompanied by wife for C2 Cisplatin, denies c/o, states he tolerated first cycle well, only had mild constipation relieved with OTC meds.  PIV started in RFA with good blood return, labs drawn and NS hydration given. Lab results reviewed, WNL and okay for treatment.  Premeds given followed by Cisplatin over one hour and posthydration over 2 hours.  Pt tolerated all infusions well, no issues noted.  Dc'd home without incident and will return next week as scheduled.

## 2024-04-15 NOTE — PROGRESS NOTES
Subjective:     Chief Complaint   Patient presents with    New Patient      Dieringer/ Mcare/ symptom and pain mgmt     Sarabjit Almendarez is a 68 y.o. male here today to establish with palliative care for symptom management while undergoing head and neck cancer treatments.  The role of palliative care was discussed they were open to conversation.  We spent the first part of the visit learning about his history which led to his current cancer diagnosis and now his entering of treatments with chemo and radiation.  We then spent the rest of the visit providing anticipatory care and what to expect with chemoradiation including worsening dry mouth, pain, skin irritation, and potential difficulty with oral intake.  I did stress that the first few weeks likely will be manageable before things ramp up but that there is an end point in site that will lead to recovery from the chemo and radiation.  He did not endorse any significant symptoms that required prescription pain medication.  We concluded the visit with a plan to follow-up after his first 2-3 cycles of treatment for reevaluation.    Problem   Squamous Cell Carcinoma of Base of Tongue (Hcc)    Formatting of this note might be different from the original.   68 Year old male with cT1 cN1 cM0 p16+ squamous cell carcinoma of the left base of tongue.    S/F TRANSORAL NON-TONSIL SURGERY; ROBOTIC ASSISTED WITH/ WITHOUT LARYNGOSCOPY (Left)   NECK DISSECTION MODIFIED RADICAL (Left)   ROBOTIC DA AURELIO SP SINGLE CONSOLE; SECONDARY PROCEDURE   DENTAL EXTRACTION(S) on 02/27/2024          Current medicines (including changes today)  Current Outpatient Medications   Medication Sig Dispense Refill    ondansetron (ZOFRAN) 4 MG Tab tablet Take 1 Tablet by mouth every four hours as needed for Nausea/Vomiting (for nausea, vomiting). 30 Tablet 6    prochlorperazine (COMPAZINE) 10 MG Tab Take 1 Tablet by mouth every 6 hours as needed (for nausea, vomiting). 30 Tablet 6    lidocaine  (XYLOCAINE) 2 % Solution OK to mix with 1:1 Mylanta/Maalox - use 5mL solution every 1 hour PRN for oral discomfort 100 mL 1    ezetimibe (ZETIA) 10 MG Tab Take 1 Tablet by mouth at bedtime. 100 Tablet 3    metoprolol SR (TOPROL XL) 25 MG TABLET SR 24 HR Take 1 Tablet by mouth every day. 90 Tablet 3    rosuvastatin (CRESTOR) 40 MG tablet Take 1 Tablet by mouth at bedtime. 90 Tablet 3    aspirin EC (ECOTRIN) 81 MG TBEC Take 81 mg by mouth every day.      ramelteon (ROZEREM) 8 MG tablet Take 1 Tablet by mouth every evening. 30 Tablet 6     No current facility-administered medications for this encounter.       Social History     Tobacco Use    Smoking status: Never    Smokeless tobacco: Never   Vaping Use    Vaping Use: Never used   Substance Use Topics    Alcohol use: No    Drug use: Never     Past Medical History:   Diagnosis Date    Arrhythmia     CAD (coronary artery disease)     CABG     Hyperlipidemia     Palpitations     PVC's      Family History   Problem Relation Age of Onset    Heart Disease Other          Objective:     Vitals:    04/11/24 0721   BP: 116/66   Pulse: 71   Temp: 36.8 °C (98.3 °F)   TempSrc: Temporal   SpO2: 93%     There is no height or weight on file to calculate BMI.     Review of Systems   Constitutional:  Negative for chills, fever and weight loss.   HENT:  Positive for sore throat. Negative for congestion and hearing loss.    Eyes:  Negative for blurred vision.   Respiratory:  Negative for cough and shortness of breath.    Cardiovascular:  Negative for chest pain and palpitations.   Gastrointestinal:  Negative for constipation, diarrhea, nausea and vomiting.   Musculoskeletal:  Negative for back pain and joint pain.   Skin:  Negative for rash.   Neurological:  Negative for headaches.   Endo/Heme/Allergies:  Does not bruise/bleed easily.   Psychiatric/Behavioral:  Negative for depression. The patient has insomnia. The patient is not nervous/anxious.      Physical Exam:   Gen: Well  developed, well nourished in no acute distress.   Skin: Pink, warm, and dry  HEENT: conjunctiva non-injected, sclera non-icteric. EOMs intact.   Nasal mucosa without edema nor erythema.   Pinna normal.    Oral mucous membranes pink and moist with no lesions.  Neck: Supple, trachea midline. No adenopathy or masses in the neck or supraclavicular regions.  Lungs: Effort is normal.   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  Did recently have neck dissection.  He is at the start of his chemo and radiation regimen.  Did discuss what to anticipate in the coming weeks.  No current need for prescription pain medications.  Will plan to follow-up as he begins cycle 2 or 3 to reevaluate his symptoms and how best palliative care could assist him.    33 minutes in total spent on patient encounter including chart review and consultation with patient oncological providers.    Followup: Return in about 2 weeks (around 4/25/2024).    Don Hinkle M.D.

## 2024-04-15 NOTE — PROGRESS NOTES
Chemotherapy Verification - SECONDARY RN       Height = 170 cm  Weight = 78.5 kg  BSA = 1.93 m2       Medication: Cisplatin  Dose: 40 mg/m2  Calculated Dose: 77.2 mg                             (In mg/m2, AUC, mg/kg)       I confirm that this process was performed independently.

## 2024-04-16 ENCOUNTER — HOSPITAL ENCOUNTER (OUTPATIENT)
Dept: RADIATION ONCOLOGY | Facility: MEDICAL CENTER | Age: 69
End: 2024-04-16
Payer: MEDICARE

## 2024-04-16 LAB
CHEMOTHERAPY INFUSION START DATE: NORMAL
CHEMOTHERAPY RECORDS: 2
CHEMOTHERAPY RECORDS: 6600
CHEMOTHERAPY RECORDS: NORMAL
CHEMOTHERAPY RX CANCER: NORMAL
DATE 1ST CHEMO CANCER: NORMAL
RAD ONC ARIA COURSE LAST TREATMENT DATE: NORMAL
RAD ONC ARIA COURSE TREATMENT ELAPSED DAYS: NORMAL
RAD ONC ARIA REFERENCE POINT DOSAGE GIVEN TO DATE: 14
RAD ONC ARIA REFERENCE POINT ID: NORMAL
RAD ONC ARIA REFERENCE POINT SESSION DOSAGE GIVEN: 2

## 2024-04-16 PROCEDURE — 77386 HCHG IMRT DELIVERY COMPLEX: CPT | Performed by: RADIOLOGY

## 2024-04-16 PROCEDURE — 77014 PR CT GUIDANCE PLACEMENT RAD THERAPY FIELDS: CPT | Mod: 26 | Performed by: RADIOLOGY

## 2024-04-17 ENCOUNTER — HOSPITAL ENCOUNTER (OUTPATIENT)
Dept: RADIATION ONCOLOGY | Facility: MEDICAL CENTER | Age: 69
End: 2024-04-17
Attending: RADIOLOGY
Payer: MEDICARE

## 2024-04-17 ENCOUNTER — HOSPITAL ENCOUNTER (OUTPATIENT)
Dept: RADIATION ONCOLOGY | Facility: MEDICAL CENTER | Age: 69
End: 2024-04-17
Payer: MEDICARE

## 2024-04-17 VITALS
BODY MASS INDEX: 27.94 KG/M2 | SYSTOLIC BLOOD PRESSURE: 120 MMHG | HEART RATE: 66 BPM | OXYGEN SATURATION: 94 % | WEIGHT: 178 LBS | DIASTOLIC BLOOD PRESSURE: 68 MMHG | TEMPERATURE: 98.8 F

## 2024-04-17 LAB
CHEMOTHERAPY INFUSION START DATE: NORMAL
CHEMOTHERAPY RECORDS: 2
CHEMOTHERAPY RECORDS: 6600
CHEMOTHERAPY RECORDS: NORMAL
CHEMOTHERAPY RX CANCER: NORMAL
DATE 1ST CHEMO CANCER: NORMAL
RAD ONC ARIA COURSE LAST TREATMENT DATE: NORMAL
RAD ONC ARIA COURSE TREATMENT ELAPSED DAYS: NORMAL
RAD ONC ARIA REFERENCE POINT DOSAGE GIVEN TO DATE: 16
RAD ONC ARIA REFERENCE POINT ID: NORMAL
RAD ONC ARIA REFERENCE POINT SESSION DOSAGE GIVEN: 2

## 2024-04-17 PROCEDURE — 77014 PR CT GUIDANCE PLACEMENT RAD THERAPY FIELDS: CPT | Mod: 26 | Performed by: RADIOLOGY

## 2024-04-17 PROCEDURE — 77386 HCHG IMRT DELIVERY COMPLEX: CPT | Performed by: RADIOLOGY

## 2024-04-17 RX ORDER — ONDANSETRON 8 MG/1
8 TABLET, ORALLY DISINTEGRATING ORAL PRN
Status: CANCELLED | OUTPATIENT
Start: 2024-04-22

## 2024-04-17 RX ORDER — 0.9 % SODIUM CHLORIDE 0.9 %
3 VIAL (ML) INJECTION PRN
Status: CANCELLED | OUTPATIENT
Start: 2024-04-21

## 2024-04-17 RX ORDER — SODIUM CHLORIDE 9 MG/ML
500 INJECTION, SOLUTION INTRAVENOUS ONCE
Status: CANCELLED | OUTPATIENT
Start: 2024-04-22

## 2024-04-17 RX ORDER — 0.9 % SODIUM CHLORIDE 0.9 %
VIAL (ML) INJECTION PRN
Status: CANCELLED | OUTPATIENT
Start: 2024-04-22

## 2024-04-17 RX ORDER — SODIUM CHLORIDE 9 MG/ML
INJECTION, SOLUTION INTRAVENOUS CONTINUOUS
Status: CANCELLED | OUTPATIENT
Start: 2024-04-22

## 2024-04-17 RX ORDER — PROCHLORPERAZINE MALEATE 10 MG
10 TABLET ORAL EVERY 6 HOURS PRN
Status: CANCELLED | OUTPATIENT
Start: 2024-04-22

## 2024-04-17 RX ORDER — DIPHENHYDRAMINE HYDROCHLORIDE 50 MG/ML
50 INJECTION INTRAMUSCULAR; INTRAVENOUS PRN
Status: CANCELLED | OUTPATIENT
Start: 2024-04-22

## 2024-04-17 RX ORDER — 0.9 % SODIUM CHLORIDE 0.9 %
10 VIAL (ML) INJECTION PRN
Status: CANCELLED | OUTPATIENT
Start: 2024-04-21

## 2024-04-17 RX ORDER — 0.9 % SODIUM CHLORIDE 0.9 %
VIAL (ML) INJECTION PRN
Status: CANCELLED | OUTPATIENT
Start: 2024-04-21

## 2024-04-17 RX ORDER — ONDANSETRON 2 MG/ML
4 INJECTION INTRAMUSCULAR; INTRAVENOUS PRN
Status: CANCELLED | OUTPATIENT
Start: 2024-04-22

## 2024-04-17 RX ORDER — 0.9 % SODIUM CHLORIDE 0.9 %
10 VIAL (ML) INJECTION PRN
Status: CANCELLED | OUTPATIENT
Start: 2024-04-22

## 2024-04-17 RX ORDER — 0.9 % SODIUM CHLORIDE 0.9 %
3 VIAL (ML) INJECTION PRN
Status: CANCELLED | OUTPATIENT
Start: 2024-04-22

## 2024-04-17 RX ORDER — EPINEPHRINE 1 MG/ML(1)
0.5 AMPUL (ML) INJECTION PRN
Status: CANCELLED | OUTPATIENT
Start: 2024-04-22

## 2024-04-17 RX ORDER — METHYLPREDNISOLONE SODIUM SUCCINATE 125 MG/2ML
125 INJECTION, POWDER, LYOPHILIZED, FOR SOLUTION INTRAMUSCULAR; INTRAVENOUS PRN
Status: CANCELLED | OUTPATIENT
Start: 2024-04-22

## 2024-04-17 ASSESSMENT — FIBROSIS 4 INDEX: FIB4 SCORE: 1.21

## 2024-04-17 ASSESSMENT — PAIN SCALES - GENERAL: PAINLEVEL: NO PAIN

## 2024-04-17 NOTE — ON TREATMENT VISIT
ON TREATMENT  NOTE  RADIATION ONCOLOGY DEPARTMENT    Patient name:  Sarabjit Almendarez    Primary Physician:  Mundo Van M.D. MRN: [id number]  CSN: [id number]   Care Team:  Patient Care Team:  Mundo Van M.D. as PCP - General ([facility] Medicine)  Josefina Geronimo M.D. (Medical Oncology)  Philomena Olivas R.N. as Nurse Navigator  Michael JOHNSON M.D. as Radiation Oncologist (Radiation Oncology)  Don Hinkle M.D. ([facility] Medicine)    : 1955, 68 y.o.     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 on 3/28/2024  Histopathologic type: Squamous cell carcinoma, NOS  Stage prefix: Initial diagnosis  Residual tumor (R): R0 - None  ECOG performance status: Grade 0  Karnofsky performance status: Score 100  Prognostic indicators: BERTRAM <2mm      TREATMENT SUMMARY:  Course First Treatment Date 2024  Course Last Treatment Date 2024  Radiation Therapy Episodes       Active Episodes       Radiation Therapy: IMRT (2024)                   Radiation Treatments         Plan Last Treated On Elapsed Days Fractions Treated Prescribed Fraction Dose (cGy) Prescribed Total Dose (cGy)    L Oroph H,N M 2024 9 @ [id number] 8 of 33 200 6,600                  Reference Point Last Treated On Elapsed Days Most Recent Session Dose (cGy) Total Dose (cGy)    L Oroph H,N 2024 9 @ [id number] 200 1,600                               SUBJECTIVE:  Tolerating well.     VITAL SIGNS:      2024     8:57 AM 4/15/2024     8:54 AM 2024     2:15 PM 2024     7:21 AM 4/10/2024     8:53 AM 2024     8:24 AM 2024    10:26 AM   Vitals   SYSTOLIC 120 127 126 116 110 122 131   DIASTOLIC 68 75 64 66 66 69 76   Pulse 66 87 74 71 65 80 70   Temperature 37.1 °C (98.8 °F) 36.8 °C (98.3  "°F) 37.7 °C (99.9 °F) 36.8 °C (98.3 °F) 37.4 °C (99.4 °F) 37.2 °C (99 °F) 36.9 °C (98.4 °F)   Respiration  17    18 16   Weight 178 173.06 177.14  179 174.16 173.8   Height  1.7 m (5' 6.93\") 1.7 m (5' 6.93\")   1.7 m (5' 6.93\") 1.676 m (5' 6\")   BMI 27.94 kg/m2 27.16 kg/m2 27.8 kg/m2  28.09 kg/m2 27.34 kg/m2 28.05 kg/m2   Pulse Oximetry 94 % 97 % 96 % 93 % 93 % 93 % 95 %     KPS: 80, Normal activity with effort; some signs or symptoms of disease (ECOG equivalent 1)  Encounter Vitals  Temperature: 37.1 °C (98.8 °F)  Temp src: Temporal  Blood Pressure : 120/68  Pulse: 66  Pulse Oximetry: 94 %  Weight: 80.7 kg (178 lb)  Pain Score: No pain      4/17/2024     8:57 AM 4/10/2024     8:53 AM 4/2/2024    10:26 AM   Pain Assessment   Pain Score NO PAIN NO PAIN MINIMAL PAIN   Pain Loc   THROAT          PHYSICAL EXAM:  Physical Exam  Vitals and nursing note reviewed.   Constitutional:       General: He is not in acute distress.     Appearance: He is well-developed.   HENT:      Head: Normocephalic.      Mouth/Throat:      Mouth: Mucous membranes are moist.      Pharynx: Oropharynx is clear. No oropharyngeal exudate or posterior oropharyngeal erythema.   Skin:     General: Skin is warm and dry.      Findings: No erythema.   Neurological:      Mental Status: He is alert and oriented to person, place, and time.   Psychiatric:         Behavior: Behavior normal.         Thought Content: Thought content normal.         Judgment: Judgment normal.              4/17/2024     8:59 AM 4/10/2024     8:54 AM   Toxicity Assessment   Toxicity Assessment Head/Neck Head/Neck   Fatigue (lethargy, malaise, asthenia) None None   Radiation Dermatitis None None   Rash/desquamation None None   Constipation None None   Dehydration None None   Mouth Dryness Mild Normal   RT Dysphagia-Pharyngeal None None   Mucositis None None   Salivary Gland Changes Sticky thickened saliva, may have slightly altered taste (e.g. metallic), additional fluids may be " required Sticky thickened saliva, may have slightly altered taste (e.g. metallic), additional fluids may be required   Stomatitis/Pharyngitis None None   Taste Disturbance (dysgeusia) Normal Normal   RT - Pain due to RT None None   Cough Absent Absent   Voice changes/stridor/larynx (hoarseness, loss of voice, laryngitis) Mild or intermittent hoarseness Normal       CURRENT MEDICATIONS:    Current Outpatient Medications:     ramelteon (ROZEREM) 8 MG tablet, Take 1 Tablet by mouth every evening., Disp: 30 Tablet, Rfl: 6    ondansetron (ZOFRAN) 4 MG Tab tablet, Take 1 Tablet by mouth every four hours as needed for Nausea/Vomiting (for nausea, vomiting)., Disp: 30 Tablet, Rfl: 6    prochlorperazine (COMPAZINE) 10 MG Tab, Take 1 Tablet by mouth every 6 hours as needed (for nausea, vomiting)., Disp: 30 Tablet, Rfl: 6    lidocaine (XYLOCAINE) 2 % Solution, OK to mix with 1:1 Mylanta/Maalox - use 5mL solution every 1 hour PRN for oral discomfort, Disp: 100 mL, Rfl: 1    ezetimibe (ZETIA) 10 MG Tab, Take 1 Tablet by mouth at bedtime., Disp: 100 Tablet, Rfl: 3    metoprolol SR (TOPROL XL) 25 MG TABLET SR 24 HR, Take 1 Tablet by mouth every day., Disp: 90 Tablet, Rfl: 3    rosuvastatin (CRESTOR) 40 MG tablet, Take 1 Tablet by mouth at bedtime., Disp: 90 Tablet, Rfl: 3    aspirin EC (ECOTRIN) 81 MG TBEC, Take 81 mg by mouth every day., Disp: , Rfl:     LABORATORY DATA:   Lab Results   Component Value Date/Time    SODIUM 136 04/15/2024 09:10 AM    POTASSIUM 3.9 04/15/2024 09:10 AM    CHLORIDE 100 04/15/2024 09:10 AM    CO2 24 04/15/2024 09:10 AM    GLUCOSE 147 (H) 04/15/2024 09:10 AM    BUN 30 (H) 04/15/2024 09:10 AM    CREATININE 0.78 04/15/2024 09:10 AM    CREATININE 1.0 08/07/2006 10:20 AM    BUNCREATRAT 19 12/01/2023 10:40 AM    BUNCREATRAT 16 01/21/2015 08:52 AM       Lab Results   Component Value Date/Time    WBC 8.7 04/15/2024 09:10 AM    RBC 5.33 04/15/2024 09:10 AM    HEMOGLOBIN 15.2 04/15/2024 09:10 AM    HEMATOCRIT  44.8 04/15/2024 09:10 AM    MCV 84.1 04/15/2024 09:10 AM    MCH 28.5 04/15/2024 09:10 AM    MCHC 33.9 04/15/2024 09:10 AM    PLATELETCT 273 04/15/2024 09:10 AM         RADIOLOGY DATA:  No results found.    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 on 3/28/2024  Prognostic indicators: BERTRAM <2mm      PLAN:  No change in treatment plan    Disposition:  Treatment plan and imaging reviewed. Questions answered. Continue therapy outlined.     Michael JOHNSON M.D.    No orders of the defined types were placed in this encounter.

## 2024-04-18 ENCOUNTER — HOSPITAL ENCOUNTER (OUTPATIENT)
Dept: RADIATION ONCOLOGY | Facility: MEDICAL CENTER | Age: 69
End: 2024-04-18
Payer: MEDICARE

## 2024-04-18 LAB
CHEMOTHERAPY INFUSION START DATE: NORMAL
CHEMOTHERAPY RECORDS: 2
CHEMOTHERAPY RECORDS: 6600
CHEMOTHERAPY RECORDS: NORMAL
CHEMOTHERAPY RX CANCER: NORMAL
DATE 1ST CHEMO CANCER: NORMAL
RAD ONC ARIA COURSE LAST TREATMENT DATE: NORMAL
RAD ONC ARIA COURSE TREATMENT ELAPSED DAYS: NORMAL
RAD ONC ARIA REFERENCE POINT DOSAGE GIVEN TO DATE: 18
RAD ONC ARIA REFERENCE POINT ID: NORMAL
RAD ONC ARIA REFERENCE POINT SESSION DOSAGE GIVEN: 2

## 2024-04-18 PROCEDURE — 77386 HCHG IMRT DELIVERY COMPLEX: CPT | Performed by: RADIOLOGY

## 2024-04-18 PROCEDURE — 77014 PR CT GUIDANCE PLACEMENT RAD THERAPY FIELDS: CPT | Mod: 26 | Performed by: RADIOLOGY

## 2024-04-18 NOTE — PROGRESS NOTES
"Pharmacy Chemotherapy Calculations    Dx: Cancer of base of tongue  Cycle 3  Previous treatment = C2 4/15/24    Protocol: Weekly Cisplatin with concurrent radiation  Cisplatin 40 mg/m2 IV over 60 minutes   7-day cycle for 6 cycles with concurrent radiation  NCCN Guidelines for Head and Neck Cancers V.2.2024.  Shruthi JA, et al. Radiother Oncol. 2006;79(1):34-38.  Milton GK, et al. Head Neck. 2005;27(1):36-43.  Lexis V, et al. J Clin Oncol. 2018;36(11):2996-8640.  Juma MEI, et al. Int J Radiat Oncol Biol Phys. 1996;36(5):999-1004.    Allergies:  Patient has no known allergies.       /69   Pulse 68   Temp 37.1 °C (98.7 °F) (Temporal)   Resp 16   Ht 1.7 m (5' 6.93\")   Wt 79.8 kg (175 lb 14.8 oz)   SpO2 94%   BMI 27.61 kg/m²  Body surface area is 1.94 meters squared.    Labs 4/22/24:  ANC~ 8220 Plt = 202k   Hgb = 13.5     SCr = 0.66 mg/dL CrCl ~ 112 mL/min   K = 4.4  Mag = 2.1    Cisplatin 40 mg/m² x 1.94 m² = 77.6 mg   <10% difference, OK to treat with final dose = 78 mg    Pato Glass, PharmD  "

## 2024-04-19 ENCOUNTER — HOSPITAL ENCOUNTER (OUTPATIENT)
Dept: RADIATION ONCOLOGY | Facility: MEDICAL CENTER | Age: 69
End: 2024-04-19
Payer: MEDICARE

## 2024-04-19 LAB
CHEMOTHERAPY INFUSION START DATE: NORMAL
CHEMOTHERAPY RECORDS: 2
CHEMOTHERAPY RECORDS: 6600
CHEMOTHERAPY RECORDS: NORMAL
CHEMOTHERAPY RX CANCER: NORMAL
DATE 1ST CHEMO CANCER: NORMAL
RAD ONC ARIA COURSE LAST TREATMENT DATE: NORMAL
RAD ONC ARIA COURSE TREATMENT ELAPSED DAYS: NORMAL
RAD ONC ARIA REFERENCE POINT DOSAGE GIVEN TO DATE: 20
RAD ONC ARIA REFERENCE POINT ID: NORMAL
RAD ONC ARIA REFERENCE POINT SESSION DOSAGE GIVEN: 2

## 2024-04-19 PROCEDURE — 77386 HCHG IMRT DELIVERY COMPLEX: CPT | Performed by: RADIOLOGY

## 2024-04-19 PROCEDURE — 77427 RADIATION TX MANAGEMENT X5: CPT | Performed by: RADIOLOGY

## 2024-04-19 PROCEDURE — 77336 RADIATION PHYSICS CONSULT: CPT | Performed by: RADIOLOGY

## 2024-04-19 PROCEDURE — 77014 PR CT GUIDANCE PLACEMENT RAD THERAPY FIELDS: CPT | Mod: 26 | Performed by: RADIOLOGY

## 2024-04-21 NOTE — PROGRESS NOTES
"Pharmacy Chemotherapy Calculation:    Dx: Squamous cell carcinoma of base of tongue, stage I, HPV mediated.          Protocol: CISplatin + XRT     *Dosing Reference*  CISplatin 40 mg/m2 IV over 60 min on Day 1   Repeat weekly x 7 cycles with radiation.     NCCN Guidelines for Head and Neck Cancers. V.3.2024.  P[place] AT, et al.J Natl Cancer Inst.2005;97:536-539.  Alexandra G, et al. Suki Oncol.2012;23(2)L427-35.    Allergies:  Patient has no known allergies.     /69   Pulse 68   Temp 37.1 °C (98.7 °F) (Temporal)   Resp 16   Ht 1.7 m (5' 6.93\")   Wt 79.8 kg (175 lb 14.8 oz)   SpO2 94%   BMI 27.61 kg/m²  Body surface area is 1.94 meters squared.    All lab results 4/22/24 within treatment parameters.       Drug Order   (Drug name, dose, route, IV Fluid & volume, frequency, number of doses) Cycle 3  Previous treatment: C2 4/15/24     Medication = CISplatin  Base Dose = 40 mg/m2   Calc Dose: Base Dose x 1.94 m² = 77.6 mg  Final Dose = 78mg  Route = IV  Fluid & Volume =  mL  Admin Duration = Over 60 min           <10% difference, okay to treat with final dose       By my signature below, I confirm this process was performed independently with the BSA and all final chemotherapy dosing calculations congruent. I have reviewed the above chemotherapy order and that my calculation of the final dose and BSA (when applicable) corroborate those calculations of the  pharmacist. Discrepancies of 10% or greater in the written dose have been addressed and documented within the [facility] Progress notes.      Caitlyn Zuniga, PharmD    "

## 2024-04-22 ENCOUNTER — HOSPITAL ENCOUNTER (OUTPATIENT)
Dept: HEMATOLOGY ONCOLOGY | Facility: MEDICAL CENTER | Age: 69
End: 2024-04-22
Attending: NURSE PRACTITIONER
Payer: MEDICARE

## 2024-04-22 ENCOUNTER — OUTPATIENT INFUSION SERVICES (OUTPATIENT)
Dept: ONCOLOGY | Facility: MEDICAL CENTER | Age: 69
End: 2024-04-22
Attending: PSYCHIATRY & NEUROLOGY
Payer: MEDICARE

## 2024-04-22 ENCOUNTER — HOSPITAL ENCOUNTER (OUTPATIENT)
Dept: RADIATION ONCOLOGY | Facility: MEDICAL CENTER | Age: 69
End: 2024-04-22

## 2024-04-22 VITALS
RESPIRATION RATE: 16 BRPM | BODY MASS INDEX: 27.61 KG/M2 | HEIGHT: 67 IN | HEART RATE: 68 BPM | SYSTOLIC BLOOD PRESSURE: 116 MMHG | OXYGEN SATURATION: 94 % | WEIGHT: 175.93 LBS | DIASTOLIC BLOOD PRESSURE: 69 MMHG | TEMPERATURE: 98.7 F

## 2024-04-22 VITALS
RESPIRATION RATE: 15 BRPM | TEMPERATURE: 98.5 F | OXYGEN SATURATION: 96 % | SYSTOLIC BLOOD PRESSURE: 102 MMHG | DIASTOLIC BLOOD PRESSURE: 62 MMHG | WEIGHT: 175.49 LBS | BODY MASS INDEX: 27.54 KG/M2 | HEIGHT: 67 IN | HEART RATE: 77 BPM

## 2024-04-22 DIAGNOSIS — C01 SQUAMOUS CELL CARCINOMA OF BASE OF TONGUE (HCC): ICD-10-CM

## 2024-04-22 DIAGNOSIS — Z79.899 ENCOUNTER FOR LONG-TERM (CURRENT) USE OF HIGH-RISK MEDICATION: ICD-10-CM

## 2024-04-22 LAB
ANION GAP SERPL CALC-SCNC: 10 MMOL/L (ref 7–16)
BASOPHILS # BLD AUTO: 0.4 % (ref 0–1.8)
BASOPHILS # BLD: 0.04 K/UL (ref 0–0.12)
BUN SERPL-MCNC: 30 MG/DL (ref 8–22)
CALCIUM SERPL-MCNC: 9.5 MG/DL (ref 8.5–10.5)
CHEMOTHERAPY INFUSION START DATE: NORMAL
CHEMOTHERAPY RECORDS: 2
CHEMOTHERAPY RECORDS: 6600
CHEMOTHERAPY RECORDS: NORMAL
CHEMOTHERAPY RX CANCER: NORMAL
CHLORIDE SERPL-SCNC: 103 MMOL/L (ref 96–112)
CO2 SERPL-SCNC: 24 MMOL/L (ref 20–33)
CREAT SERPL-MCNC: 0.66 MG/DL (ref 0.5–1.4)
DATE 1ST CHEMO CANCER: NORMAL
EOSINOPHIL # BLD AUTO: 0.18 K/UL (ref 0–0.51)
EOSINOPHIL NFR BLD: 1.8 % (ref 0–6.9)
ERYTHROCYTE [DISTWIDTH] IN BLOOD BY AUTOMATED COUNT: 38.9 FL (ref 35.9–50)
GFR SERPLBLD CREATININE-BSD FMLA CKD-EPI: 101 ML/MIN/1.73 M 2
GLUCOSE SERPL-MCNC: 88 MG/DL (ref 65–99)
HCT VFR BLD AUTO: 40 % (ref 42–52)
HGB BLD-MCNC: 13.5 G/DL (ref 14–18)
IMM GRANULOCYTES # BLD AUTO: 0.04 K/UL (ref 0–0.11)
IMM GRANULOCYTES NFR BLD AUTO: 0.4 % (ref 0–0.9)
LYMPHOCYTES # BLD AUTO: 0.65 K/UL (ref 1–4.8)
LYMPHOCYTES NFR BLD: 6.6 % (ref 22–41)
MAGNESIUM SERPL-MCNC: 2.1 MG/DL (ref 1.5–2.5)
MCH RBC QN AUTO: 28.4 PG (ref 27–33)
MCHC RBC AUTO-ENTMCNC: 33.8 G/DL (ref 32.3–36.5)
MCV RBC AUTO: 84.2 FL (ref 81.4–97.8)
MONOCYTES # BLD AUTO: 0.73 K/UL (ref 0–0.85)
MONOCYTES NFR BLD AUTO: 7.4 % (ref 0–13.4)
NEUTROPHILS # BLD AUTO: 8.22 K/UL (ref 1.82–7.42)
NEUTROPHILS NFR BLD: 83.4 % (ref 44–72)
NRBC # BLD AUTO: 0 K/UL
NRBC BLD-RTO: 0 /100 WBC (ref 0–0.2)
OUTPT INFUS CBC COMMENT OICOM: ABNORMAL
PLATELET # BLD AUTO: 202 K/UL (ref 164–446)
PMV BLD AUTO: 10 FL (ref 9–12.9)
POTASSIUM SERPL-SCNC: 4.4 MMOL/L (ref 3.6–5.5)
RAD ONC ARIA COURSE LAST TREATMENT DATE: NORMAL
RAD ONC ARIA COURSE TREATMENT ELAPSED DAYS: NORMAL
RAD ONC ARIA REFERENCE POINT DOSAGE GIVEN TO DATE: 22
RAD ONC ARIA REFERENCE POINT ID: NORMAL
RAD ONC ARIA REFERENCE POINT SESSION DOSAGE GIVEN: 2
RBC # BLD AUTO: 4.75 M/UL (ref 4.7–6.1)
SODIUM SERPL-SCNC: 137 MMOL/L (ref 135–145)
WBC # BLD AUTO: 9.9 K/UL (ref 4.8–10.8)

## 2024-04-22 PROCEDURE — 85025 COMPLETE CBC W/AUTO DIFF WBC: CPT

## 2024-04-22 PROCEDURE — 83735 ASSAY OF MAGNESIUM: CPT

## 2024-04-22 PROCEDURE — 99214 OFFICE O/P EST MOD 30 MIN: CPT | Performed by: NURSE PRACTITIONER

## 2024-04-22 PROCEDURE — 77014 PR CT GUIDANCE PLACEMENT RAD THERAPY FIELDS: CPT | Mod: 26 | Performed by: RADIOLOGY

## 2024-04-22 PROCEDURE — 77386 HCHG IMRT DELIVERY COMPLEX: CPT | Performed by: RADIOLOGY

## 2024-04-22 PROCEDURE — 80048 BASIC METABOLIC PNL TOTAL CA: CPT

## 2024-04-22 PROCEDURE — 96367 TX/PROPH/DG ADDL SEQ IV INF: CPT

## 2024-04-22 PROCEDURE — 700105 HCHG RX REV CODE 258: Performed by: NURSE PRACTITIONER

## 2024-04-22 PROCEDURE — 96366 THER/PROPH/DIAG IV INF ADDON: CPT

## 2024-04-22 PROCEDURE — 96361 HYDRATE IV INFUSION ADD-ON: CPT

## 2024-04-22 PROCEDURE — 96375 TX/PRO/DX INJ NEW DRUG ADDON: CPT

## 2024-04-22 PROCEDURE — 99212 OFFICE O/P EST SF 10 MIN: CPT | Performed by: NURSE PRACTITIONER

## 2024-04-22 PROCEDURE — 96413 CHEMO IV INFUSION 1 HR: CPT

## 2024-04-22 PROCEDURE — 700111 HCHG RX REV CODE 636 W/ 250 OVERRIDE (IP): Performed by: NURSE PRACTITIONER

## 2024-04-22 RX ORDER — SODIUM CHLORIDE 9 MG/ML
500 INJECTION, SOLUTION INTRAVENOUS ONCE
Status: COMPLETED | OUTPATIENT
Start: 2024-04-22 | End: 2024-04-22

## 2024-04-22 RX ORDER — PHENOL 1.4 %
10 AEROSOL, SPRAY (ML) MUCOUS MEMBRANE PRN
COMMUNITY

## 2024-04-22 RX ADMIN — ONDANSETRON 16 MG: 2 INJECTION INTRAMUSCULAR; INTRAVENOUS at 10:38

## 2024-04-22 RX ADMIN — CISPLATIN 78 MG: 1 INJECTION INTRAVENOUS at 11:32

## 2024-04-22 RX ADMIN — DEXAMETHASONE SODIUM PHOSPHATE 12 MG: 4 INJECTION, SOLUTION INTRA-ARTICULAR; INTRALESIONAL; INTRAMUSCULAR; INTRAVENOUS; SOFT TISSUE at 10:25

## 2024-04-22 RX ADMIN — MAGNESIUM SULFATE HEPTAHYDRATE: 500 INJECTION, SOLUTION INTRAMUSCULAR; INTRAVENOUS at 12:36

## 2024-04-22 RX ADMIN — SODIUM CHLORIDE 500 ML: 9 INJECTION, SOLUTION INTRAVENOUS at 09:40

## 2024-04-22 RX ADMIN — FOSAPREPITANT DIMEGLUMINE 150 MG: 150 INJECTION, POWDER, LYOPHILIZED, FOR SOLUTION INTRAVENOUS at 10:55

## 2024-04-22 ASSESSMENT — ENCOUNTER SYMPTOMS
DIZZINESS: 1
FEVER: 0
PALPITATIONS: 0
NAUSEA: 0
DIARRHEA: 0
SORE THROAT: 1
MYALGIAS: 0
WEIGHT LOSS: 0
CONSTIPATION: 1
VOMITING: 0
COUGH: 0
CHILLS: 0
SHORTNESS OF BREATH: 0
HEADACHES: 0

## 2024-04-22 ASSESSMENT — FIBROSIS 4 INDEX
FIB4 SCORE: 1.21
FIB4 SCORE: 1.21

## 2024-04-22 ASSESSMENT — PAIN SCALES - GENERAL: PAINLEVEL: 3=SLIGHT PAIN

## 2024-04-22 NOTE — PROGRESS NOTES
Chemotherapy Verification - PRIMARY RN    C3 D1    Height = 170 cm  Weight = 79.8 kg  BSA = 1.94 m^2       Medication: Cisplatin  Dose: 40 mg/m^2  Calculated Dose: 77.6 mg                             (In mg/m2, AUC, mg/kg)     I confirm this process was performed independently with the BSA and all final chemotherapy dosing calculations congruent.  Any discrepancies of 10% or greater have been addressed with the chemotherapy pharmacist. The resolution of the discrepancy has been documented in the EPIC progress notes.

## 2024-04-22 NOTE — PROGRESS NOTES
Subjective     Sarabjit Almendarez is a 68 y.o. male who presents with Cancer ([place]/ Squamous cell carcinoma of base of tongue/ Pre chemo)          HPI    Patient seen today for evaluation prior to cycle 3 of chemotherapy employing weeklyi Cisplatin with concurrent XRT for tongue cancer, for continued monitoring of symptoms and side effects of cancer treatments.    Oncology history of presenting illness:  Patient states that he was golfing with a friend and at that time had discussed pain on the left side of his neck.  His friend who is a physician referred him to Dr. Silverio, ENT.  He was seen by ENT and underwent a CT of the soft tissue neck on 12/19/2023 which demonstrated left neck level 2 lymph nodes measuring 1.8 x 1.7 cm.  Dr. Silverio did proceed with a biopsy of the left cervical node which demonstrated epithelial elements most consistent with squamous cell carcinoma, p16 positive.  Patient was subsequently referred to [facility] and saw Dr. Maldonado on 1/29/2024.  PET scan done on 2/8/2024 did demonstrate a mass at the left base of the tongue with 2 metastatic left cervical lymph nodes.    He did undergo an excision of his left glossopharyngeal sulcus muscle and left neck dissection along with dental extraction of tooth #24 on 2/27/2024.  Pathology did confirm squamous cell carcinoma HPV associated with focal intra tumor all perineural invasion and monica metastasis.  There was tumor present at the deep tissue edge, 1.5 mm from the posterior mucosal edge.  Additional deep margins of the left constrictor muscle and left thigh localized cysts positive for carcinoma.  1 known level 2 node positive for metastatic squamous cell carcinoma.    Patient established with Dr. Shelby, radiation oncologist and Dr. Geronimo, medical oncologist.  Patient was initiated on treatment with concurrent chemo RT on 4/8/2024.    Treatment history:  02/27/24: Excision of his left glossopharyngeal sulcus muscle and left neck dissection along  with dental extraction of tooth 24   04/08/24: C1 Cisplatin  04/15/24: C2 Cisplatin   04/22/24: C3 Cisplatin    Interval history:  Clinically patient is doing well.  He is noticing some increased pain in the throat this past week and.  He still able to eat and drink well and is maintaining his weight.  He denies any nausea or vomiting.  He did still have some mild constipation but that was resolved with the use of MiraLAX.  He is voiding without difficulty.  He does note some dizziness at times when getting off the radiation table.  He denies any headaches, peripheral neuropathy, or tinnitus.    No Known Allergies    Current Outpatient Medications on File Prior to Encounter   Medication Sig Dispense Refill    Melatonin 10 MG Tab Take 10 mg by mouth as needed.      ramelteon (ROZEREM) 8 MG tablet Take 1 Tablet by mouth every evening. 30 Tablet 6    ondansetron (ZOFRAN) 4 MG Tab tablet Take 1 Tablet by mouth every four hours as needed for Nausea/Vomiting (for nausea, vomiting). 30 Tablet 6    prochlorperazine (COMPAZINE) 10 MG Tab Take 1 Tablet by mouth every 6 hours as needed (for nausea, vomiting). 30 Tablet 6    lidocaine (XYLOCAINE) 2 % Solution OK to mix with 1:1 Mylanta/Maalox - use 5mL solution every 1 hour PRN for oral discomfort 100 mL 1    ezetimibe (ZETIA) 10 MG Tab Take 1 Tablet by mouth at bedtime. 100 Tablet 3    metoprolol SR (TOPROL XL) 25 MG TABLET SR 24 HR Take 1 Tablet by mouth every day. 90 Tablet 3    rosuvastatin (CRESTOR) 40 MG tablet Take 1 Tablet by mouth at bedtime. 90 Tablet 3    aspirin EC (ECOTRIN) 81 MG TBEC Take 81 mg by mouth every day.       No current facility-administered medications on file prior to encounter.         Review of Systems   Constitutional:  Positive for malaise/fatigue. Negative for chills, fever and weight loss.   HENT:  Positive for sore throat.    Respiratory:  Negative for cough and shortness of breath.    Cardiovascular:  Negative for chest pain and palpitations.  "  Gastrointestinal:  Positive for constipation (mild but improved with MiraLax). Negative for diarrhea, nausea and vomiting.   Genitourinary:  Negative for dysuria.   Musculoskeletal:  Negative for myalgias.   Neurological:  Positive for dizziness (at times especially when getting off the radiation table). Negative for headaches.              Objective     /62 (BP Location: Left arm, Patient Position: Sitting, BP Cuff Size: Adult)   Pulse 77   Temp 36.9 °C (98.5 °F) (Temporal)   Resp 15   Ht 1.7 m (5' 6.93\")   Wt 79.6 kg (175 lb 7.8 oz)   SpO2 96%   BMI 27.54 kg/m²      Physical Exam  Vitals reviewed.   Constitutional:       General: He is not in acute distress.     Appearance: Normal appearance. He is not diaphoretic.   HENT:      Head: Normocephalic and atraumatic.      Mouth/Throat:      Mouth: Mucous membranes are moist.      Pharynx: Oropharynx is clear. No oropharyngeal exudate or posterior oropharyngeal erythema.   Cardiovascular:      Rate and Rhythm: Normal rate and regular rhythm.      Heart sounds: Normal heart sounds. No murmur heard.     No friction rub. No gallop.   Pulmonary:      Effort: Pulmonary effort is normal. No respiratory distress.      Breath sounds: Normal breath sounds. No wheezing.   Abdominal:      General: Bowel sounds are normal. There is no distension.      Palpations: Abdomen is soft.      Tenderness: There is no abdominal tenderness.   Musculoskeletal:         General: No swelling or tenderness. Normal range of motion.   Skin:     General: Skin is warm and dry.   Neurological:      Mental Status: He is alert and oriented to person, place, and time.   Psychiatric:         Mood and Affect: Mood normal.         Behavior: Behavior normal.                     Assessment & Plan       1. 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 Encounter for long-term (current) use of high-risk medication                1.  Patient with squamous cell carcinoma, p16 positive, " oropharyngeal carcinoma currently on concurrent chemo RT employing weekly cisplatin.  Patient is scheduled to proceed with cycle 3 today.  Overall clinically he is doing well.  He is able to eat and drink and maintain his weight.  He does note some mild tenderness on his throat, otherwise no other clinical complaints.  If labs meet parameters he is okay to proceed with treatment as planned.  Patient to follow-up in the clinic in 1 week, or sooner if needed.      Please note that this dictation was created using voice recognition software. I have made every reasonable attempt to correct obvious errors, but I expect that there are errors of grammar and possibly content that I did not discover before finalizing the note.

## 2024-04-22 NOTE — ADDENDUM NOTE
Encounter addended by: Lana Sanders, Med Ass't on: 4/22/2024 8:03 AM   Actions taken: Charge Capture section accepted

## 2024-04-22 NOTE — PROGRESS NOTES
Pt arrives to [facility] for cycle 3 of Cisplatin for cancer of tongue with concurrent radiation therapy.  Discussed plan of care with pt.  Pt denies s/sx of infection, nausea or pain.  Pt denies mouth sores or difficulty swallowing.  PIV established to R-AC and labs were drawn.  Pre-hydration initiated.   Labs reviewed and results meet parameters for treatment.  Pre-medications given.  Cisplatin infused without adverse reaction.  Post-hydration given.  PIV flushed with NS and site was removed intact.  Site covered with gauze/coban.  Confirmed next appt on 4/29/2024 with pt.  Pt dc home to self care.

## 2024-04-22 NOTE — PROGRESS NOTES
Chemotherapy Verification - SECONDARY RN       Height = 170 cm  Weight = 79.8 kg  BSA = 1.94 m2       Medication: Cisplatin  Dose: 40 mg/m2  Calculated Dose: 77.6 mg                             (In mg/m2, AUC, mg/kg)     I confirm that this process was performed independently.

## 2024-04-23 ENCOUNTER — HOSPITAL ENCOUNTER (OUTPATIENT)
Dept: RADIATION ONCOLOGY | Facility: MEDICAL CENTER | Age: 69
End: 2024-04-23

## 2024-04-23 ENCOUNTER — HOSPITAL ENCOUNTER (OUTPATIENT)
Dept: HEMATOLOGY ONCOLOGY | Facility: MEDICAL CENTER | Age: 69
End: 2024-04-23
Attending: FAMILY MEDICINE
Payer: MEDICARE

## 2024-04-23 VITALS
HEART RATE: 75 BPM | BODY MASS INDEX: 28.56 KG/M2 | TEMPERATURE: 99 F | RESPIRATION RATE: 12 BRPM | HEIGHT: 67 IN | DIASTOLIC BLOOD PRESSURE: 60 MMHG | WEIGHT: 182 LBS | OXYGEN SATURATION: 95 % | SYSTOLIC BLOOD PRESSURE: 102 MMHG

## 2024-04-23 DIAGNOSIS — C01 SQUAMOUS CELL CARCINOMA OF BASE OF TONGUE (HCC): ICD-10-CM

## 2024-04-23 LAB
CHEMOTHERAPY INFUSION START DATE: NORMAL
CHEMOTHERAPY RECORDS: 2
CHEMOTHERAPY RECORDS: 6600
CHEMOTHERAPY RECORDS: NORMAL
CHEMOTHERAPY RX CANCER: NORMAL
DATE 1ST CHEMO CANCER: NORMAL
RAD ONC ARIA COURSE LAST TREATMENT DATE: NORMAL
RAD ONC ARIA COURSE TREATMENT ELAPSED DAYS: NORMAL
RAD ONC ARIA REFERENCE POINT DOSAGE GIVEN TO DATE: 24
RAD ONC ARIA REFERENCE POINT ID: NORMAL
RAD ONC ARIA REFERENCE POINT SESSION DOSAGE GIVEN: 2

## 2024-04-23 PROCEDURE — 99212 OFFICE O/P EST SF 10 MIN: CPT | Performed by: FAMILY MEDICINE

## 2024-04-23 PROCEDURE — 99213 OFFICE O/P EST LOW 20 MIN: CPT | Performed by: FAMILY MEDICINE

## 2024-04-23 PROCEDURE — 77014 PR CT GUIDANCE PLACEMENT RAD THERAPY FIELDS: CPT | Mod: 26 | Performed by: RADIOLOGY

## 2024-04-23 PROCEDURE — 77386 HCHG IMRT DELIVERY COMPLEX: CPT | Performed by: RADIOLOGY

## 2024-04-23 ASSESSMENT — ENCOUNTER SYMPTOMS
FEVER: 0
HEARTBURN: 1
CHILLS: 0
SHORTNESS OF BREATH: 0
COUGH: 0
NAUSEA: 0
VOMITING: 0
PALPITATIONS: 0
CONSTIPATION: 1
DIARRHEA: 0
SORE THROAT: 1

## 2024-04-23 ASSESSMENT — PATIENT HEALTH QUESTIONNAIRE - PHQ9: CLINICAL INTERPRETATION OF PHQ2 SCORE: 0

## 2024-04-23 ASSESSMENT — FIBROSIS 4 INDEX: FIB4 SCORE: 1.64

## 2024-04-23 NOTE — PROGRESS NOTES
Subjective:     Chief Complaint   Patient presents with    Cancer     Dieringer/ [facility]/ 2 wk F[facility]     Sarabjit Almendarez is a 68 y.o. male here today for follow-up on symptom management for oncological pain.  Patient is now in week 3 of his chemo and radiation for head and neck cancer.  He is accompanied by his wife today.  He reports that he has had a steady increase intensity of his symptoms including sore throat, dry mouth, and some recent development of heartburn.  He also reports a bout of constipation that was resolved with stool softeners and MiraLAX.  For his pain he has not required any over-the-counter pain medications.  He also endorses some of the impacts cancer can have as a relates to mental health and the questions of why did he get cancer as well as the unknown that cancer brings.  We spent time discussing anticipatory guidance of his symptoms including the use of Tylenol if he needs pain relief, adjusting food textures based on his dry mouth, use of lidocaine if mouth sores develop, and daily use of MiraLAX and stool softeners for constipation.  We concluded the visit with a plan to check in in 2 weeks for reevaluation.    Problem   Squamous Cell Carcinoma of Base of Tongue (Hcc)    Formatting of this note might be different from the original.   68 Year old male with cT1 cN1 cM0 p16+ squamous cell carcinoma of the left base of tongue.    S/F TRANSORAL NON-TONSIL SURGERY; ROBOTIC ASSISTED WITH/ WITHOUT LARYNGOSCOPY (Left)   NECK DISSECTION MODIFIED RADICAL (Left)   ROBOTIC DA AURELIO SP SINGLE CONSOLE; SECONDARY PROCEDURE   DENTAL EXTRACTION(S) on 02/27/2024          Current medicines (including changes today)  Current Outpatient Medications   Medication Sig Dispense Refill    Melatonin 10 MG Tab Take 10 mg by mouth as needed.      ondansetron (ZOFRAN) 4 MG Tab tablet Take 1 Tablet by mouth every four hours as needed for Nausea/Vomiting (for nausea, vomiting). 30 Tablet 6    prochlorperazine (COMPAZINE)  "10 MG Tab Take 1 Tablet by mouth every 6 hours as needed (for nausea, vomiting). 30 Tablet 6    lidocaine (XYLOCAINE) 2 % Solution OK to mix with 1:1 Mylanta/Maalox - use 5mL solution every 1 hour PRN for oral discomfort 100 mL 1    ezetimibe (ZETIA) 10 MG Tab Take 1 Tablet by mouth at bedtime. 100 Tablet 3    metoprolol SR (TOPROL XL) 25 MG TABLET SR 24 HR Take 1 Tablet by mouth every day. 90 Tablet 3    rosuvastatin (CRESTOR) 40 MG tablet Take 1 Tablet by mouth at bedtime. 90 Tablet 3    aspirin EC (ECOTRIN) 81 MG TBEC Take 81 mg by mouth every day.      ramelteon (ROZEREM) 8 MG tablet Take 1 Tablet by mouth every evening. (Patient not taking: Reported on 4/23/2024) 30 Tablet 6     No current facility-administered medications for this encounter.       Social History     Tobacco Use    Smoking status: Never    Smokeless tobacco: Never   Vaping Use    Vaping Use: Never used   Substance Use Topics    Alcohol use: No    Drug use: Never     Past Medical History:   Diagnosis Date    Arrhythmia     CAD (coronary artery disease)     CABG     Hyperlipidemia     Palpitations     PVC's      Family History   Problem Relation Age of Onset    Heart Disease Other          Objective:     Vitals:    04/23/24 0751   BP: 102/60   BP Location: Right arm   Patient Position: Sitting   BP Cuff Size: Adult   Pulse: 75   Resp: 12   Temp: 37.2 °C (99 °F)   TempSrc: Temporal   SpO2: 95%   Weight: 82.6 kg (182 lb)   Height: 1.7 m (5' 6.93\")     Body mass index is 28.56 kg/m².     Review of Systems   Constitutional:  Positive for malaise/fatigue. Negative for chills and fever.   HENT:  Positive for sore throat.    Respiratory:  Negative for cough and shortness of breath.    Cardiovascular:  Negative for chest pain and palpitations.   Gastrointestinal:  Positive for constipation and heartburn. Negative for diarrhea, nausea and vomiting.     Physical Exam:   Gen: Well developed, well nourished in no acute distress.   Skin: Pink, warm, and " dry  HEENT: conjunctiva non-injected, sclera non-icteric. EOMs intact.   Nasal mucosa without edema nor erythema.   Pinna normal.    Oral mucous membranes dry  Neck: Supple, trachea midline.  Mild skin erythema bilateral neck  Lungs: Effort is normal.   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  Is currently week 3 of chemoradiation.  Is having the expected worsening of symptoms.  He is not requiring opioid medication for pain and is tolerating other symptoms reasonably well.  We did discuss anticipatory guidance of what to expect with his symptoms in the coming weeks.  Will plan to follow-up in 2 weeks to reevaluate his symptoms as he gets further into chemo and radiation.    24 minutes in total spent on patient encounter including chart review and consultation with patient's oncological providers.    Followup: Return in about 2 weeks (around 5/7/2024).    Don Hinkle M.D.

## 2024-04-23 NOTE — ASSESSMENT & PLAN NOTE
Patient with known squamous cell carcinoma the base of the tongue.  Is currently week 3 of chemoradiation.  Is having the expected worsening of symptoms.  He is not requiring opioid medication for pain and is tolerating other symptoms reasonably well.  We did discuss anticipatory guidance of what to expect with his symptoms in the coming weeks.  Will plan to follow-up in 2 weeks to reevaluate his symptoms as he gets further into chemo and radiation.

## 2024-04-24 ENCOUNTER — HOSPITAL ENCOUNTER (OUTPATIENT)
Dept: RADIATION ONCOLOGY | Facility: MEDICAL CENTER | Age: 69
End: 2024-04-24
Attending: RADIOLOGY
Payer: MEDICARE

## 2024-04-24 ENCOUNTER — HOSPITAL ENCOUNTER (OUTPATIENT)
Dept: RADIATION ONCOLOGY | Facility: MEDICAL CENTER | Age: 69
End: 2024-04-24
Payer: MEDICARE

## 2024-04-24 ENCOUNTER — DOCUMENTATION (OUTPATIENT)
Dept: RADIATION ONCOLOGY | Facility: MEDICAL CENTER | Age: 69
End: 2024-04-24
Payer: MEDICARE

## 2024-04-24 VITALS
OXYGEN SATURATION: 92 % | WEIGHT: 176.2 LBS | BODY MASS INDEX: 27.65 KG/M2 | DIASTOLIC BLOOD PRESSURE: 63 MMHG | TEMPERATURE: 98.4 F | RESPIRATION RATE: 16 BRPM | HEART RATE: 65 BPM | SYSTOLIC BLOOD PRESSURE: 120 MMHG

## 2024-04-24 DIAGNOSIS — Z79.899 ENCOUNTER FOR LONG-TERM (CURRENT) USE OF HIGH-RISK MEDICATION: ICD-10-CM

## 2024-04-24 DIAGNOSIS — G47.09 OTHER INSOMNIA: ICD-10-CM

## 2024-04-24 LAB
CHEMOTHERAPY INFUSION START DATE: NORMAL
CHEMOTHERAPY RECORDS: 2
CHEMOTHERAPY RECORDS: 6600
CHEMOTHERAPY RECORDS: NORMAL
CHEMOTHERAPY RX CANCER: NORMAL
DATE 1ST CHEMO CANCER: NORMAL
RAD ONC ARIA COURSE LAST TREATMENT DATE: NORMAL
RAD ONC ARIA COURSE TREATMENT ELAPSED DAYS: NORMAL
RAD ONC ARIA REFERENCE POINT DOSAGE GIVEN TO DATE: 26
RAD ONC ARIA REFERENCE POINT ID: NORMAL
RAD ONC ARIA REFERENCE POINT SESSION DOSAGE GIVEN: 2

## 2024-04-24 PROCEDURE — 77386 HCHG IMRT DELIVERY COMPLEX: CPT | Performed by: RADIOLOGY

## 2024-04-24 PROCEDURE — 77014 PR CT GUIDANCE PLACEMENT RAD THERAPY FIELDS: CPT | Mod: 26 | Performed by: RADIOLOGY

## 2024-04-24 RX ORDER — PROCHLORPERAZINE MALEATE 10 MG
10 TABLET ORAL EVERY 6 HOURS PRN
Status: CANCELLED | OUTPATIENT
Start: 2024-04-29

## 2024-04-24 RX ORDER — EPINEPHRINE 1 MG/ML(1)
0.5 AMPUL (ML) INJECTION PRN
Status: CANCELLED | OUTPATIENT
Start: 2024-04-29

## 2024-04-24 RX ORDER — 0.9 % SODIUM CHLORIDE 0.9 %
3 VIAL (ML) INJECTION PRN
Status: CANCELLED | OUTPATIENT
Start: 2024-04-28

## 2024-04-24 RX ORDER — 0.9 % SODIUM CHLORIDE 0.9 %
10 VIAL (ML) INJECTION PRN
Status: CANCELLED | OUTPATIENT
Start: 2024-04-28

## 2024-04-24 RX ORDER — DIPHENHYDRAMINE HYDROCHLORIDE 50 MG/ML
50 INJECTION INTRAMUSCULAR; INTRAVENOUS PRN
Status: CANCELLED | OUTPATIENT
Start: 2024-04-29

## 2024-04-24 RX ORDER — ONDANSETRON 8 MG/1
8 TABLET, ORALLY DISINTEGRATING ORAL PRN
Status: CANCELLED | OUTPATIENT
Start: 2024-04-29

## 2024-04-24 RX ORDER — DOXEPIN 6 MG/1
6 TABLET, FILM COATED ORAL NIGHTLY PRN
Qty: 30 TABLET | Refills: 2 | Status: SHIPPED | OUTPATIENT
Start: 2024-04-24

## 2024-04-24 RX ORDER — 0.9 % SODIUM CHLORIDE 0.9 %
10 VIAL (ML) INJECTION PRN
Status: CANCELLED | OUTPATIENT
Start: 2024-04-29

## 2024-04-24 RX ORDER — 0.9 % SODIUM CHLORIDE 0.9 %
3 VIAL (ML) INJECTION PRN
Status: CANCELLED | OUTPATIENT
Start: 2024-04-29

## 2024-04-24 RX ORDER — SODIUM CHLORIDE 9 MG/ML
500 INJECTION, SOLUTION INTRAVENOUS ONCE
Status: CANCELLED | OUTPATIENT
Start: 2024-04-29

## 2024-04-24 RX ORDER — METHYLPREDNISOLONE SODIUM SUCCINATE 125 MG/2ML
125 INJECTION, POWDER, LYOPHILIZED, FOR SOLUTION INTRAMUSCULAR; INTRAVENOUS PRN
Status: CANCELLED | OUTPATIENT
Start: 2024-04-29

## 2024-04-24 RX ORDER — ONDANSETRON 2 MG/ML
4 INJECTION INTRAMUSCULAR; INTRAVENOUS PRN
Status: CANCELLED | OUTPATIENT
Start: 2024-04-29

## 2024-04-24 RX ORDER — 0.9 % SODIUM CHLORIDE 0.9 %
VIAL (ML) INJECTION PRN
Status: CANCELLED | OUTPATIENT
Start: 2024-04-28

## 2024-04-24 RX ORDER — 0.9 % SODIUM CHLORIDE 0.9 %
VIAL (ML) INJECTION PRN
Status: CANCELLED | OUTPATIENT
Start: 2024-04-29

## 2024-04-24 RX ORDER — SODIUM CHLORIDE 9 MG/ML
INJECTION, SOLUTION INTRAVENOUS CONTINUOUS
Status: CANCELLED | OUTPATIENT
Start: 2024-04-29

## 2024-04-24 ASSESSMENT — PAIN SCALES - GENERAL: PAINLEVEL: 3=SLIGHT PAIN

## 2024-04-24 ASSESSMENT — FIBROSIS 4 INDEX: FIB4 SCORE: 1.64

## 2024-04-24 NOTE — ON TREATMENT VISIT
ON TREATMENT  NOTE  RADIATION ONCOLOGY DEPARTMENT    Patient name:  Sarabjit Almendarez    Primary Physician:  Mundo Van M.D. MRN: [id number]  CSN: [id number]   Care Team:  Patient Care Team:  Mundo Van M.D. as PCP - General (Family Medicine)  Josefina Geronimo M.D. (Medical Oncology)  Philomena Olivas R.N. as Nurse Navigator  Michael JOHNSON M.D. as Radiation Oncologist (Radiation Oncology)  Don Hinkle M.D. (Family Medicine)  JAKOB Danielson (Medical Oncology)    : 1955, 68 y.o.     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 on 3/28/2024  Histopathologic type: Squamous cell carcinoma, NOS  Stage prefix: Initial diagnosis  Residual tumor (R): R0 - None  ECOG performance status: Grade 0  Karnofsky performance status: Score 100  Prognostic indicators: BERTRAM <2mm      TREATMENT SUMMARY:  Course First Treatment Date 2024  Course Last Treatment Date 2024  Radiation Therapy Episodes       Active Episodes       Radiation Therapy: IMRT (2024)                   Radiation Treatments         Plan Last Treated On Elapsed Days Fractions Treated Prescribed Fraction Dose (cGy) Prescribed Total Dose (cGy)    L Oroph H,N M 2024 16 @ [id number] 13 of 33 200 6,600                  Reference Point Last Treated On Elapsed Days Most Recent Session Dose (cGy) Total Dose (cGy)    L Oroph H,N 2024 16 @ [id number] 200 2,600                               SUBJECTIVE:  Tolerating well. Heartburn yesterday.     VITAL SIGNS:      2024     8:50 AM 2024     7:51 AM 2024     9:24 AM 2024     7:28 AM 2024     8:57 AM 4/15/2024     8:54 AM 2024     2:15 PM   Vitals   SYSTOLIC 120 102 116 102 120 127 126   DIASTOLIC 63 60 69 62 68 75 64  "  Pulse 65 75 68 77 66 87 74   Temperature 36.9 °C (98.4 °F) 37.2 °C (99 °F) 37.1 °C (98.7 °F) 36.9 °C (98.5 °F) 37.1 °C (98.8 °F) 36.8 °C (98.3 °F) 37.7 °C (99.9 °F)   Respiration 16 12 16 15  17    Weight 176.2 182 175.93 175.49 178 173.06 177.14   Height  1.7 m (5' 6.93\") 1.7 m (5' 6.93\") 1.7 m (5' 6.93\")  1.7 m (5' 6.93\") 1.7 m (5' 6.93\")   BMI 27.65 kg/m2 28.56 kg/m2 27.61 kg/m2 27.54 kg/m2 27.94 kg/m2 27.16 kg/m2 27.8 kg/m2   Pulse Oximetry 92 % 95 % 94 % 96 % 94 % 97 % 96 %     KPS: 70, Cares for self; unable to carry on normal activity or to do active work (ECOG equivalent 1)  Encounter Vitals  Temperature: 36.9 °C (98.4 °F)  Blood Pressure : 120/63  Pulse: 65  Respiration: 16  Pulse Oximetry: 92 %  Weight: 79.9 kg (176 lb 3.2 oz)  Weight Source: Stand Up Scale  Pain Score: 3=Slight Pain      4/24/2024     8:50 AM 4/22/2024     7:28 AM 4/17/2024     8:57 AM 4/10/2024     8:53 AM 4/2/2024    10:26 AM   Pain Assessment   Pain Score 3=SLIGHT DENAE 3=SLIGHT DENAE NO PAIN NO PAIN MINIMAL PAIN   Pain Loc THROAT THROAT   THROAT          PHYSICAL EXAM:  Physical Exam  Vitals and nursing note reviewed.   Constitutional:       General: He is not in acute distress.     Appearance: He is well-developed.   HENT:      Head: Normocephalic.      Mouth/Throat:      Mouth: Mucous membranes are moist.      Pharynx: Posterior oropharyngeal erythema (Left soft palate) present. No oropharyngeal exudate.   Skin:     General: Skin is warm and dry.      Findings: No erythema.   Neurological:      Mental Status: He is alert and oriented to person, place, and time.   Psychiatric:         Behavior: Behavior normal.         Thought Content: Thought content normal.         Judgment: Judgment normal.              4/24/2024     8:51 AM 4/17/2024     8:59 AM 4/10/2024     8:54 AM   Toxicity Assessment   Toxicity Assessment Head/Neck Head/Neck Head/Neck   Fatigue (lethargy, malaise, asthenia) Increased fatigue over baseline, but not altering " normal activities None None   Radiation Dermatitis None None None   Rash/desquamation None None None   Constipation None None None   Dehydration None None None   Mouth Dryness Mild Mild Normal   RT Dysphagia-Pharyngeal None None None   Mucositis None None None   Salivary Gland Changes Sticky thickened saliva, may have slightly altered taste (e.g. metallic), additional fluids may be required Sticky thickened saliva, may have slightly altered taste (e.g. metallic), additional fluids may be required Sticky thickened saliva, may have slightly altered taste (e.g. metallic), additional fluids may be required   Stomatitis/Pharyngitis None None None   Taste Disturbance (dysgeusia)  Normal Normal   RT - Pain due to RT Mild pain not interfering with function None None   Cough Mild, relieved by non-prescription medication Absent Absent   Voice changes/stridor/larynx (hoarseness, loss of voice, laryngitis) Mild or intermittent hoarseness Mild or intermittent hoarseness Normal       CURRENT MEDICATIONS:    Current Outpatient Medications:     Melatonin 10 MG Tab, Take 10 mg by mouth as needed., Disp: , Rfl:     ramelteon (ROZEREM) 8 MG tablet, Take 1 Tablet by mouth every evening. (Patient not taking: Reported on 4/23/2024), Disp: 30 Tablet, Rfl: 6    ondansetron (ZOFRAN) 4 MG Tab tablet, Take 1 Tablet by mouth every four hours as needed for Nausea/Vomiting (for nausea, vomiting)., Disp: 30 Tablet, Rfl: 6    prochlorperazine (COMPAZINE) 10 MG Tab, Take 1 Tablet by mouth every 6 hours as needed (for nausea, vomiting)., Disp: 30 Tablet, Rfl: 6    lidocaine (XYLOCAINE) 2 % Solution, OK to mix with 1:1 Mylanta/Maalox - use 5mL solution every 1 hour PRN for oral discomfort, Disp: 100 mL, Rfl: 1    ezetimibe (ZETIA) 10 MG Tab, Take 1 Tablet by mouth at bedtime., Disp: 100 Tablet, Rfl: 3    metoprolol SR (TOPROL XL) 25 MG TABLET SR 24 HR, Take 1 Tablet by mouth every day., Disp: 90 Tablet, Rfl: 3    rosuvastatin (CRESTOR) 40 MG tablet,  Take 1 Tablet by mouth at bedtime., Disp: 90 Tablet, Rfl: 3    aspirin EC (ECOTRIN) 81 MG TBEC, Take 81 mg by mouth every day., Disp: , Rfl:     LABORATORY DATA:   Lab Results   Component Value Date/Time    SODIUM 137 04/22/2024 09:38 AM    POTASSIUM 4.4 04/22/2024 09:38 AM    CHLORIDE 103 04/22/2024 09:38 AM    CO2 24 04/22/2024 09:38 AM    GLUCOSE 88 04/22/2024 09:38 AM    BUN 30 (H) 04/22/2024 09:38 AM    CREATININE 0.66 04/22/2024 09:38 AM    CREATININE 1.0 08/07/2006 10:20 AM    BUNCREATRAT 19 12/01/2023 10:40 AM    BUNCREATRAT 16 01/21/2015 08:52 AM       Lab Results   Component Value Date/Time    WBC 9.9 04/22/2024 09:38 AM    RBC 4.75 04/22/2024 09:38 AM    HEMOGLOBIN 13.5 (L) 04/22/2024 09:38 AM    HEMATOCRIT 40.0 (L) 04/22/2024 09:38 AM    MCV 84.2 04/22/2024 09:38 AM    MCH 28.4 04/22/2024 09:38 AM    MCHC 33.8 04/22/2024 09:38 AM    PLATELETCT 202 04/22/2024 09:38 AM         RADIOLOGY DATA:  No results found.    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 on 3/28/2024  Prognostic indicators: BERTRAM <2mm      PLAN:  No change in treatment plan    Disposition:  Treatment plan and imaging reviewed. Questions answered. Continue therapy outlined.     Michael JOHNSON M.D.    No orders of the defined types were placed in this encounter.

## 2024-04-24 NOTE — PROGRESS NOTES
Patient continues to be followed by interdisciplinary Oncology Symptom Management Clinic team.  Patient's case discussed at weekly meeting on 4/24/24.    New interventions/additional follow up per this discussion include:      1.  Maintaining weight      2.  Palliative visit yesterday, reassurances provided      3.  Treatment wise, doing quite well      4.  RD w/ no concerns this week      The patient will continue to be followed by Symptom Management Clinic team.

## 2024-04-24 NOTE — PROGRESS NOTES
Nutrition Services: Brief Update  Wt Readings from Last 7 Encounters:   04/24/24 79.9 kg (176 lb 3.2 oz)   04/23/24 82.6 kg (182 lb)   04/22/24 79.6 kg (175 lb 7.8 oz)   04/22/24 79.8 kg (175 lb 14.8 oz)   04/17/24 80.7 kg (178 lb)   04/15/24 78.5 kg (173 lb 1 oz)   04/12/24 80.3 kg (177 lb 2.2 oz)     Weight Change: Pt's weight appears stable in the past 1 week.     Pertinent Recent Lab work (DATE 4/22): Bun 30    RD able to visit pt following OTV. Per pt, he continues to eat solid foods and is drinking high calorie protein shakes. Pt reports he is continuing to aim for high calories (9646-5601 kcals/day). He states his shake has ingredients like yogurt, milk, fruit, etc and he is wondering if he can have the high calorie shake daily. He reports he starting having heart burn yesterday and has already discussed this today with MD.     Plan/Recommend:  Encouraged pt to continue his current regimen  Discussed continuing to including high calorie/protein shakes daily as tolerated    Pt verbalizes understanding and is receptive to information provided.   RD available PRN and will make further recommendations as indicated within policy.    [phone]

## 2024-04-25 ENCOUNTER — HOSPITAL ENCOUNTER (OUTPATIENT)
Dept: RADIATION ONCOLOGY | Facility: MEDICAL CENTER | Age: 69
End: 2024-04-25

## 2024-04-25 LAB
CHEMOTHERAPY INFUSION START DATE: NORMAL
CHEMOTHERAPY RECORDS: 2
CHEMOTHERAPY RECORDS: 6600
CHEMOTHERAPY RECORDS: NORMAL
CHEMOTHERAPY RX CANCER: NORMAL
DATE 1ST CHEMO CANCER: NORMAL
RAD ONC ARIA COURSE LAST TREATMENT DATE: NORMAL
RAD ONC ARIA COURSE TREATMENT ELAPSED DAYS: NORMAL
RAD ONC ARIA REFERENCE POINT DOSAGE GIVEN TO DATE: 28
RAD ONC ARIA REFERENCE POINT ID: NORMAL
RAD ONC ARIA REFERENCE POINT SESSION DOSAGE GIVEN: 2

## 2024-04-25 PROCEDURE — 77386 HCHG IMRT DELIVERY COMPLEX: CPT | Performed by: RADIOLOGY

## 2024-04-25 PROCEDURE — 77014 PR CT GUIDANCE PLACEMENT RAD THERAPY FIELDS: CPT | Mod: 26 | Performed by: RADIOLOGY

## 2024-04-26 ENCOUNTER — HOSPITAL ENCOUNTER (OUTPATIENT)
Dept: RADIATION ONCOLOGY | Facility: MEDICAL CENTER | Age: 69
End: 2024-04-26

## 2024-04-26 LAB
CHEMOTHERAPY INFUSION START DATE: NORMAL
CHEMOTHERAPY RECORDS: 2
CHEMOTHERAPY RECORDS: 6600
CHEMOTHERAPY RECORDS: NORMAL
CHEMOTHERAPY RX CANCER: NORMAL
DATE 1ST CHEMO CANCER: NORMAL
RAD ONC ARIA COURSE LAST TREATMENT DATE: NORMAL
RAD ONC ARIA COURSE TREATMENT ELAPSED DAYS: NORMAL
RAD ONC ARIA REFERENCE POINT DOSAGE GIVEN TO DATE: 30
RAD ONC ARIA REFERENCE POINT ID: NORMAL
RAD ONC ARIA REFERENCE POINT SESSION DOSAGE GIVEN: 2

## 2024-04-26 PROCEDURE — 77386 HCHG IMRT DELIVERY COMPLEX: CPT | Performed by: RADIOLOGY

## 2024-04-26 PROCEDURE — 77336 RADIATION PHYSICS CONSULT: CPT | Performed by: RADIOLOGY

## 2024-04-26 PROCEDURE — 77427 RADIATION TX MANAGEMENT X5: CPT | Performed by: RADIOLOGY

## 2024-04-26 PROCEDURE — 77014 PR CT GUIDANCE PLACEMENT RAD THERAPY FIELDS: CPT | Mod: 26 | Performed by: RADIOLOGY

## 2024-04-27 NOTE — PROGRESS NOTES
"Pharmacy Chemotherapy Calculation:    Dx: Squamous cell carcinoma of base of tongue, stage I, HPV mediated.          Protocol: CISplatin + XRT     *Dosing Reference*  CISplatin 40 mg/m2 IV over 60 min on Day 1   Repeat weekly x 7 cycles with radiation.     NCCN Guidelines for Head and Neck Cancers. V.3.2024.  [place] AT, et al.J Natl Cancer Inst.2005;97:536-539.  Alexandra G, et al. Suki Oncol.2012;23(2)L427-35.    Allergies:  Patient has no known allergies.     /69   Pulse 78   Temp 36.7 °C (98 °F) (Temporal)   Resp 18   Ht 1.7 m (5' 6.93\")   Wt 79.4 kg (175 lb 0.7 oz)   SpO2 95%   BMI 27.47 kg/m²  Body surface area is 1.94 meters squared.    Labs 4/29/24:  ANC~ 5070 Plt = 199 k   Hgb = 13.3     SCr = 0.72 mg/dL CrCl ~ 108 mL/min   Mag = 1.9  K+ = 4.4    Drug Order   (Drug name, dose, route, IV Fluid & volume, frequency, number of doses) Cycle 4  Previous treatment: C3 on 4/22/24     Medication = CISplatin  Base Dose = 40 mg/m2   Calc Dose: Base Dose x 1.94 m² = 77.6 mg  Final Dose = 78 mg  Route = IV  Fluid & Volume =  mL  Admin Duration = Over 60 min           <10% difference, okay to treat with final dose       By my signature below, I confirm this process was performed independently with the BSA and all final chemotherapy dosing calculations congruent. I have reviewed the above chemotherapy order and that my calculation of the final dose and BSA (when applicable) corroborate those calculations of the  pharmacist. Discrepancies of 10% or greater in the written dose have been addressed and documented within the [facility] Progress notes.    Fariba Massey, PharmD, BCOP    "

## 2024-04-28 NOTE — PROGRESS NOTES
"Pharmacy Chemotherapy Calculations    Dx: Cancer of base of tongue  Cycle 4  Previous treatment = C3 4/22/24    Protocol: Weekly Cisplatin with concurrent radiation  Cisplatin 40 mg/m2 IV over 60 minutes   7-day cycle for 6 cycles with concurrent radiation  NCCN Guidelines for Head and Neck Cancers V.2.2024.  Shruthi JA, et al. Radiother Oncol. 2006;79(1):34-38.  Milton GK, et al. Head Neck. 2005;27(1):36-43.  Lexis V, et al. J Clin Oncol. 2018;36(11):3976-1648.  Juma MEI, et al. Int J Radiat Oncol Biol Phys. 1996;36(5):999-1004.    Allergies:  Patient has no known allergies.       /69   Pulse 78   Temp 36.7 °C (98 °F) (Temporal)   Resp 18   Ht 1.7 m (5' 6.93\")   Wt 79.4 kg (175 lb 0.7 oz)   SpO2 95%   BMI 27.47 kg/m²  Body surface area is 1.94 meters squared.    Labs 4/29/24:  ANC~ 5070 Plt = 199k   Hgb = 13.3     SCr = 0.72 mg/dL CrCl ~ 108 mL/min   K = 4.4  Mag = 1.9    Cisplatin 40 mg/m² x 1.94 m² = 77.6 mg   <10% difference, OK to treat with final dose = 78 mg    Pato Glass, PharmD  "

## 2024-04-29 ENCOUNTER — HOSPITAL ENCOUNTER (OUTPATIENT)
Dept: RADIATION ONCOLOGY | Facility: MEDICAL CENTER | Age: 69
End: 2024-04-29

## 2024-04-29 ENCOUNTER — HOSPITAL ENCOUNTER (OUTPATIENT)
Dept: HEMATOLOGY ONCOLOGY | Facility: MEDICAL CENTER | Age: 69
End: 2024-04-29
Attending: STUDENT IN AN ORGANIZED HEALTH CARE EDUCATION/TRAINING PROGRAM
Payer: MEDICARE

## 2024-04-29 ENCOUNTER — OUTPATIENT INFUSION SERVICES (OUTPATIENT)
Dept: ONCOLOGY | Facility: MEDICAL CENTER | Age: 69
End: 2024-04-29
Attending: PSYCHIATRY & NEUROLOGY
Payer: MEDICARE

## 2024-04-29 VITALS
SYSTOLIC BLOOD PRESSURE: 122 MMHG | DIASTOLIC BLOOD PRESSURE: 68 MMHG | OXYGEN SATURATION: 95 % | HEIGHT: 67 IN | WEIGHT: 174.94 LBS | HEART RATE: 89 BPM | BODY MASS INDEX: 27.46 KG/M2 | TEMPERATURE: 99.7 F

## 2024-04-29 VITALS
TEMPERATURE: 98 F | RESPIRATION RATE: 18 BRPM | DIASTOLIC BLOOD PRESSURE: 69 MMHG | SYSTOLIC BLOOD PRESSURE: 119 MMHG | HEIGHT: 67 IN | HEART RATE: 78 BPM | BODY MASS INDEX: 27.47 KG/M2 | WEIGHT: 175.04 LBS | OXYGEN SATURATION: 95 %

## 2024-04-29 DIAGNOSIS — Z79.899 ENCOUNTER FOR LONG-TERM (CURRENT) USE OF HIGH-RISK MEDICATION: ICD-10-CM

## 2024-04-29 DIAGNOSIS — C01 SQUAMOUS CELL CARCINOMA OF BASE OF TONGUE (HCC): ICD-10-CM

## 2024-04-29 LAB
ANION GAP SERPL CALC-SCNC: 12 MMOL/L (ref 7–16)
BASOPHILS # BLD AUTO: 0.5 % (ref 0–1.8)
BASOPHILS # BLD: 0.03 K/UL (ref 0–0.12)
BUN SERPL-MCNC: 29 MG/DL (ref 8–22)
CALCIUM SERPL-MCNC: 9.6 MG/DL (ref 8.5–10.5)
CHEMOTHERAPY INFUSION START DATE: NORMAL
CHEMOTHERAPY RECORDS: 2
CHEMOTHERAPY RECORDS: 6600
CHEMOTHERAPY RECORDS: NORMAL
CHEMOTHERAPY RX CANCER: NORMAL
CHLORIDE SERPL-SCNC: 103 MMOL/L (ref 96–112)
CO2 SERPL-SCNC: 24 MMOL/L (ref 20–33)
CREAT SERPL-MCNC: 0.72 MG/DL (ref 0.5–1.4)
DATE 1ST CHEMO CANCER: NORMAL
EOSINOPHIL # BLD AUTO: 0.11 K/UL (ref 0–0.51)
EOSINOPHIL NFR BLD: 1.8 % (ref 0–6.9)
ERYTHROCYTE [DISTWIDTH] IN BLOOD BY AUTOMATED COUNT: 38.8 FL (ref 35.9–50)
GFR SERPLBLD CREATININE-BSD FMLA CKD-EPI: 99 ML/MIN/1.73 M 2
GLUCOSE SERPL-MCNC: 114 MG/DL (ref 65–99)
HCT VFR BLD AUTO: 38.2 % (ref 42–52)
HGB BLD-MCNC: 13.3 G/DL (ref 14–18)
IMM GRANULOCYTES # BLD AUTO: 0.02 K/UL (ref 0–0.11)
IMM GRANULOCYTES NFR BLD AUTO: 0.3 % (ref 0–0.9)
LYMPHOCYTES # BLD AUTO: 0.45 K/UL (ref 1–4.8)
LYMPHOCYTES NFR BLD: 7.2 % (ref 22–41)
MAGNESIUM SERPL-MCNC: 1.9 MG/DL (ref 1.5–2.5)
MCH RBC QN AUTO: 29.3 PG (ref 27–33)
MCHC RBC AUTO-ENTMCNC: 34.8 G/DL (ref 32.3–36.5)
MCV RBC AUTO: 84.1 FL (ref 81.4–97.8)
MONOCYTES # BLD AUTO: 0.54 K/UL (ref 0–0.85)
MONOCYTES NFR BLD AUTO: 8.7 % (ref 0–13.4)
NEUTROPHILS # BLD AUTO: 5.07 K/UL (ref 1.82–7.42)
NEUTROPHILS NFR BLD: 81.5 % (ref 44–72)
NRBC # BLD AUTO: 0 K/UL
NRBC BLD-RTO: 0 /100 WBC (ref 0–0.2)
OUTPT INFUS CBC COMMENT OICOM: ABNORMAL
PLATELET # BLD AUTO: 199 K/UL (ref 164–446)
PMV BLD AUTO: 9.6 FL (ref 9–12.9)
POTASSIUM SERPL-SCNC: 4.4 MMOL/L (ref 3.6–5.5)
RAD ONC ARIA COURSE LAST TREATMENT DATE: NORMAL
RAD ONC ARIA COURSE TREATMENT ELAPSED DAYS: NORMAL
RAD ONC ARIA REFERENCE POINT DOSAGE GIVEN TO DATE: 32
RAD ONC ARIA REFERENCE POINT ID: NORMAL
RAD ONC ARIA REFERENCE POINT SESSION DOSAGE GIVEN: 2
RBC # BLD AUTO: 4.54 M/UL (ref 4.7–6.1)
SODIUM SERPL-SCNC: 139 MMOL/L (ref 135–145)
WBC # BLD AUTO: 6.2 K/UL (ref 4.8–10.8)

## 2024-04-29 PROCEDURE — 96361 HYDRATE IV INFUSION ADD-ON: CPT

## 2024-04-29 PROCEDURE — 83735 ASSAY OF MAGNESIUM: CPT

## 2024-04-29 PROCEDURE — 700105 HCHG RX REV CODE 258: Performed by: NURSE PRACTITIONER

## 2024-04-29 PROCEDURE — 96413 CHEMO IV INFUSION 1 HR: CPT

## 2024-04-29 PROCEDURE — 700111 HCHG RX REV CODE 636 W/ 250 OVERRIDE (IP): Performed by: NURSE PRACTITIONER

## 2024-04-29 PROCEDURE — 77386 HCHG IMRT DELIVERY COMPLEX: CPT | Performed by: RADIOLOGY

## 2024-04-29 PROCEDURE — 96366 THER/PROPH/DIAG IV INF ADDON: CPT

## 2024-04-29 PROCEDURE — 96375 TX/PRO/DX INJ NEW DRUG ADDON: CPT

## 2024-04-29 PROCEDURE — 99212 OFFICE O/P EST SF 10 MIN: CPT | Performed by: STUDENT IN AN ORGANIZED HEALTH CARE EDUCATION/TRAINING PROGRAM

## 2024-04-29 PROCEDURE — 96367 TX/PROPH/DG ADDL SEQ IV INF: CPT

## 2024-04-29 PROCEDURE — 80048 BASIC METABOLIC PNL TOTAL CA: CPT

## 2024-04-29 PROCEDURE — 85025 COMPLETE CBC W/AUTO DIFF WBC: CPT

## 2024-04-29 RX ORDER — SODIUM CHLORIDE 9 MG/ML
500 INJECTION, SOLUTION INTRAVENOUS ONCE
Status: COMPLETED | OUTPATIENT
Start: 2024-04-29 | End: 2024-04-29

## 2024-04-29 RX ADMIN — ONDANSETRON 16 MG: 2 INJECTION INTRAMUSCULAR; INTRAVENOUS at 09:54

## 2024-04-29 RX ADMIN — SODIUM CHLORIDE 500 ML: 9 INJECTION, SOLUTION INTRAVENOUS at 08:55

## 2024-04-29 RX ADMIN — FOSAPREPITANT 150 MG: 150 INJECTION, POWDER, LYOPHILIZED, FOR SOLUTION INTRAVENOUS at 10:11

## 2024-04-29 RX ADMIN — MAGNESIUM SULFATE HEPTAHYDRATE: 500 INJECTION, SOLUTION INTRAMUSCULAR; INTRAVENOUS at 12:12

## 2024-04-29 RX ADMIN — DEXAMETHASONE SODIUM PHOSPHATE 12 MG: 4 INJECTION, SOLUTION INTRA-ARTICULAR; INTRALESIONAL; INTRAMUSCULAR; INTRAVENOUS; SOFT TISSUE at 09:41

## 2024-04-29 RX ADMIN — CISPLATIN 78 MG: 1 INJECTION INTRAVENOUS at 10:52

## 2024-04-29 ASSESSMENT — ENCOUNTER SYMPTOMS
SHORTNESS OF BREATH: 0
COUGH: 0
PALPITATIONS: 0
DEPRESSION: 0
BRUISES/BLEEDS EASILY: 0
SORE THROAT: 0
FOCAL WEAKNESS: 0
CHILLS: 0
TREMORS: 0
HEADACHES: 0
DIZZINESS: 0
MEMORY LOSS: 0
NECK PAIN: 0
SENSORY CHANGE: 0
VOMITING: 0
TINGLING: 0
HEARTBURN: 0
WEIGHT LOSS: 1
BLURRED VISION: 0
NAUSEA: 0
ABDOMINAL PAIN: 0
FEVER: 0
ORTHOPNEA: 0
SPUTUM PRODUCTION: 0
WHEEZING: 0

## 2024-04-29 ASSESSMENT — FIBROSIS 4 INDEX
FIB4 SCORE: 1.64
FIB4 SCORE: 1.64

## 2024-04-29 NOTE — PROGRESS NOTES
Consult Note: Hematology/Oncology     Referring Provider: Dr. Heron SUAREZ  Primary Care:  Mundo Van M.D.    Chief Complaint   Patient presents with    Cancer     Prechemo        Current Treatment:     4/8/24: C1 Cisplatin  4/15/24: C2 Cisplatin  04/22/24: C3 Cisplatin   04/29/24: C4 Cisplatin     Prior Treatment:     February 27, 2024:  excision of his left glossopharyngeal sulcus muscle and left neck dissection along with dental extraction of tooth 24.     Subjective:   History of Presenting Illness:  Sarabjit Almendarez is a 68 y.o. 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    Patient reports that he was golfing and told a friend about pain on the left side of neck.  Friend is a physician who referred him to Dr. Silverio.  He had a CT of the soft tissue neck on December 19, 2023 which demonstrated left neck level 2 lymph nodes measuring 1.8 x 1.7 cm.  He saw Dr. Silverio who conducted a biopsy of the left cervical lymph node which demonstrated epithelial elements most consistent with squamous cell carcinoma p16 positive.  He was referred to [facility] and saw Dr. Maldonado on January 29, 2024.  He had a PET scan done on February 8, 2024 which demonstrated a mass at the left base of the tongue with 2 metastatic left cervical lymph nodes.    On February 27, 2024 he underwent a an excision of his left glossopharyngeal sulcus muscle and left neck dissection along with dental extraction of tooth 24.  Pathology demonstrated squamous cell carcinoma HPV associated with focal intra tumor all perineural invasion and monica metastases.  There was tumor present at the deep tissue edge 1.5 mm from the posterior mucosal edge.  Additional deep margins of the left constrictor muscle and left thigh localized cysts positive for carcinoma.  1 known level 2 level node positive for metastatic squamous cell    Is currently just over 1 month post surgery.  He reports doing very well and had some  dysphagia after surgery and pain but is overall doing well.  He is nervous for the treatment that is ahead of him.    Presents with Altagracia his wife      Interval History    He is eating well and drinking.  He is staying hydrated.      He has had some ringing in his ears.  This happens occasionally (4-5x) and lasts a few seconds.     No PN.     He notes that he had some heart burn again.     He reports being constipated.  He continues to use miralax and senokot.           Past Medical History:   Diagnosis Date    Arrhythmia     CAD (coronary artery disease)     CABG     Hyperlipidemia     Palpitations     PVC's        Past Surgical History:   Procedure Laterality Date    MULTIPLE CORONARY ARTERY BYPASS      TONSILLECTOMY Bilateral     ZZZ CARDIAC CATH         Social History     Tobacco Use    Smoking status: Never    Smokeless tobacco: Never   Vaping Use    Vaping Use: Never used   Substance Use Topics    Alcohol use: No    Drug use: Never        Family History   Problem Relation Age of Onset    Heart Disease Other        No Known Allergies    Current Outpatient Medications   Medication Sig Dispense Refill    Doxepin HCl 6 MG Tab Take 6 mg by mouth at bedtime as needed (Insomnia). 30 Tablet 2    Melatonin 10 MG Tab Take 10 mg by mouth as needed.      ondansetron (ZOFRAN) 4 MG Tab tablet Take 1 Tablet by mouth every four hours as needed for Nausea/Vomiting (for nausea, vomiting). 30 Tablet 6    prochlorperazine (COMPAZINE) 10 MG Tab Take 1 Tablet by mouth every 6 hours as needed (for nausea, vomiting). 30 Tablet 6    lidocaine (XYLOCAINE) 2 % Solution OK to mix with 1:1 Mylanta/Maalox - use 5mL solution every 1 hour PRN for oral discomfort 100 mL 1    ezetimibe (ZETIA) 10 MG Tab Take 1 Tablet by mouth at bedtime. 100 Tablet 3    metoprolol SR (TOPROL XL) 25 MG TABLET SR 24 HR Take 1 Tablet by mouth every day. 90 Tablet 3    rosuvastatin (CRESTOR) 40 MG tablet Take 1 Tablet by mouth at bedtime. 90 Tablet 3    aspirin EC  "(ECOTRIN) 81 MG TBEC Take 81 mg by mouth every day.      ramelteon (ROZEREM) 8 MG tablet Take 1 Tablet by mouth every evening. (Patient not taking: Reported on 4/23/2024) 30 Tablet 6     No current facility-administered medications for this encounter.       Review of Systems   Constitutional:  Positive for malaise/fatigue and weight loss. Negative for chills and fever.   HENT:  Negative for congestion, ear pain, nosebleeds and sore throat.         Voice changes   Eyes:  Negative for blurred vision.   Respiratory:  Negative for cough, sputum production, shortness of breath and wheezing.    Cardiovascular:  Negative for chest pain, palpitations, orthopnea and leg swelling.   Gastrointestinal:  Negative for abdominal pain, heartburn, nausea and vomiting.   Genitourinary:  Negative for dysuria, frequency and urgency.   Musculoskeletal:  Negative for neck pain.   Neurological:  Negative for dizziness, tingling, tremors, sensory change, focal weakness and headaches.   Endo/Heme/Allergies:  Does not bruise/bleed easily.   Psychiatric/Behavioral:  Negative for depression, memory loss and suicidal ideas.    All other systems reviewed and are negative.      Problem list, medications, and allergies reviewed by myself today in Epic.     Objective:     Vitals:    04/29/24 0751   BP: 122/68   BP Location: Right arm   Patient Position: Sitting   BP Cuff Size: Adult   Pulse: 89   Temp: 37.6 °C (99.7 °F)   TempSrc: Temporal   SpO2: 95%   Weight: 79.3 kg (174 lb 15 oz)   Height: 1.7 m (5' 6.93\")       DESC; KARNOFSKY SCALE WITH ECOG EQUIVALENT: 90, Able to carry on normal activity; minor signs or symptoms of disease (ECOG equivalent 0)    DISTRESS LEVEL: no apparent distress    Physical Exam  Constitutional:       General: He is not in acute distress.     Appearance: Normal appearance.   HENT:      Head: Normocephalic and atraumatic.      Nose: Nose normal. No congestion.      Mouth/Throat:      Mouth: Mucous membranes are moist.     "  Pharynx: Oropharynx is clear.   Eyes:      General: No scleral icterus.     Conjunctiva/sclera: Conjunctivae normal.      Pupils: Pupils are equal, round, and reactive to light.   Neck:      Comments: L scar, well healing   Cardiovascular:      Rate and Rhythm: Normal rate and regular rhythm.      Pulses: Normal pulses.      Heart sounds: No murmur heard.     No friction rub.   Pulmonary:      Effort: Pulmonary effort is normal. No respiratory distress.      Breath sounds: Normal breath sounds. No stridor. No wheezing or rales.   Chest:      Chest wall: No tenderness.   Abdominal:      General: Abdomen is flat. Bowel sounds are normal. There is no distension.      Palpations: Abdomen is soft. There is no mass.      Tenderness: There is no abdominal tenderness. There is no guarding or rebound.   Musculoskeletal:         General: No swelling, tenderness or deformity. Normal range of motion.      Cervical back: Normal range of motion and neck supple. No rigidity or tenderness.      Right lower leg: No edema.      Left lower leg: No edema.   Lymphadenopathy:      Cervical: No cervical adenopathy.   Skin:     General: Skin is warm.      Coloration: Skin is not jaundiced or pale.      Findings: No bruising or rash.   Neurological:      General: No focal deficit present.      Mental Status: He is alert and oriented to person, place, and time. Mental status is at baseline.      Motor: No weakness.   Psychiatric:         Mood and Affect: Mood normal.         Behavior: Behavior normal.         Thought Content: Thought content normal.         Judgment: Judgment normal.         Labs:   Most recent labs reviewed.  Please see the lab tab of chart review    Imaging:   Most recent images below have been independently reviewed by me.  Please see the imaging tab of chart review    Pathology:  2/27/24  A. 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 on 3/28/2024       Mr. Almendarez is a 69 yo M presents with stage I HPV mediated oropharyngeal carcinoma.    He is s/p C3 of cisplatin    Plan  -continue with C4, pending labs    Chemo Regimen  7 day cycle for 6 cycles with concurrent RT     Cisplatin 40mg/m2 IV over 60 minutes on Day 1     References   NCCN Guidelines for HN Cancer V.1.2021  Hawkins JA et al. Radiother oncol 2006;79(1):34-38  Milton GUEVARA et al. HN 2005;27(1):36-43    2.  Insomnia  ->30 min onset to fall asleep  -continue melatonin   -did not  doxepin        No follow-ups on file.     Any questions and concerns raised by the patient were addressed and answered. Patient denies any further questions.  Patient encouraged to call the office with any concerns or issues.     Josefina Geronimo M.D.  Hematology/Oncology    30 minutes spent on this visit

## 2024-04-29 NOTE — PROGRESS NOTES
Chemotherapy Verification - PRIMARY RN      Height = 170 cm  Weight = 79.4 kg  BSA = 1.94 m^2       Medication: Cisplatin  Dose: 40 mg/m^2  Calculated Dose: 77.6 mg                             (In mg/m2, AUC, mg/kg)     I confirm this process was performed independently with the BSA and all final chemotherapy dosing calculations congruent.  Any discrepancies of 10% or greater have been addressed with the chemotherapy pharmacist. The resolution of the discrepancy has been documented in the EPIC progress notes.

## 2024-04-29 NOTE — ADDENDUM NOTE
Encounter addended by: Horace Crow on: 4/29/2024 8:14 AM   Actions taken: Charge Capture section accepted

## 2024-04-29 NOTE — PROGRESS NOTES
"Pharmacy Chemotherapy Calculations    Dx: Cancer of base of tongue  Cycle 5  Previous treatment = C4 4/29/24    Protocol: Weekly Cisplatin with concurrent radiation  Cisplatin 40 mg/m2 IV over 60 minutes   7-day cycle for 6 cycles with concurrent radiation  NCCN Guidelines for Head and Neck Cancers V.2.2024.  Shruthi JA, et al. Radiother Oncol. 2006;79(1):34-38.  Milton GK, et al. Head Neck. 2005;27(1):36-43.  Lexis V, et al. J Clin Oncol. 2018;36(11):3689-7313.  Juma MEI, et al. Int J Radiat Oncol Biol Phys. 1996;36(5):999-1004.    Allergies:  Patient has no known allergies.       /67   Pulse 76   Temp 36.9 °C (98.4 °F) (Temporal)   Resp 16   Ht 1.7 m (5' 6.93\")   Wt 79.8 kg (175 lb 14.8 oz)   SpO2 95%   BMI 27.61 kg/m²  Body surface area is 1.94 meters squared.    Labs 5/6/24:  ANC~ 4050 Plt = 168k   Hgb = 12.7     SCr = 0.78 mg/dL CrCl ~ 100 mL/min   K = 4.3  Mag = 1.8    Cisplatin 40 mg/m² x 1.94 m² = 77.6 mg   <10% difference, OK to treat with final dose = 78 mg    Pato Glass, PharmD  "

## 2024-04-29 NOTE — PROGRESS NOTES
Chemotherapy Verification - SECONDARY RN       Height = 170 cm  Weight = 79.4 kg  BSA = 1.94 m2       Medication: Cisplatin  Dose: 40 mg/m2  Calculated Dose: 77.6 mg                             (In mg/m2, AUC, mg/kg)     I confirm that this process was performed independently.

## 2024-04-29 NOTE — PROGRESS NOTES
Pt arrives to [facility] for cycle 4 of Cisplatin for cancer of tongue with concurrent radiation therapy.  Discussed plan of care with pt.  Pt denies s/sx of infection, nausea or pain.  Pt denies mouth sores or difficulty swallowing.  PIV established to L-FA and labs were drawn.  Pre-hydration initiated.   Labs reviewed and results meet parameters for treatment.  Pre-medications given.  Cisplatin infused without adverse reaction.  Post-hydration given.  PIV flushed with NS and site was removed intact.  Site covered with gauze/coban.  Confirmed next appt on 5/06/2024 with pt.  Pt dc home to self care.

## 2024-04-30 ENCOUNTER — HOSPITAL ENCOUNTER (OUTPATIENT)
Dept: RADIATION ONCOLOGY | Facility: MEDICAL CENTER | Age: 69
End: 2024-04-30
Payer: MEDICARE

## 2024-04-30 LAB
CHEMOTHERAPY INFUSION START DATE: NORMAL
CHEMOTHERAPY RECORDS: 2
CHEMOTHERAPY RECORDS: 6600
CHEMOTHERAPY RECORDS: NORMAL
CHEMOTHERAPY RX CANCER: NORMAL
DATE 1ST CHEMO CANCER: NORMAL
RAD ONC ARIA COURSE LAST TREATMENT DATE: NORMAL
RAD ONC ARIA COURSE TREATMENT ELAPSED DAYS: NORMAL
RAD ONC ARIA REFERENCE POINT DOSAGE GIVEN TO DATE: 34
RAD ONC ARIA REFERENCE POINT ID: NORMAL
RAD ONC ARIA REFERENCE POINT SESSION DOSAGE GIVEN: 2

## 2024-04-30 PROCEDURE — 77386 HCHG IMRT DELIVERY COMPLEX: CPT | Performed by: RADIOLOGY

## 2024-04-30 RX ORDER — 0.9 % SODIUM CHLORIDE 0.9 %
10 VIAL (ML) INJECTION PRN
OUTPATIENT
Start: 2024-05-05

## 2024-04-30 RX ORDER — EPINEPHRINE 1 MG/ML(1)
0.5 AMPUL (ML) INJECTION PRN
OUTPATIENT
Start: 2024-05-06

## 2024-04-30 RX ORDER — SODIUM CHLORIDE 9 MG/ML
INJECTION, SOLUTION INTRAVENOUS CONTINUOUS
OUTPATIENT
Start: 2024-05-06

## 2024-04-30 RX ORDER — METHYLPREDNISOLONE SODIUM SUCCINATE 125 MG/2ML
125 INJECTION, POWDER, LYOPHILIZED, FOR SOLUTION INTRAMUSCULAR; INTRAVENOUS PRN
OUTPATIENT
Start: 2024-05-06

## 2024-04-30 RX ORDER — 0.9 % SODIUM CHLORIDE 0.9 %
3 VIAL (ML) INJECTION PRN
OUTPATIENT
Start: 2024-05-06

## 2024-04-30 RX ORDER — PROCHLORPERAZINE MALEATE 10 MG
10 TABLET ORAL EVERY 6 HOURS PRN
OUTPATIENT
Start: 2024-05-06

## 2024-04-30 RX ORDER — 0.9 % SODIUM CHLORIDE 0.9 %
VIAL (ML) INJECTION PRN
OUTPATIENT
Start: 2024-05-06

## 2024-04-30 RX ORDER — DIPHENHYDRAMINE HYDROCHLORIDE 50 MG/ML
50 INJECTION INTRAMUSCULAR; INTRAVENOUS PRN
OUTPATIENT
Start: 2024-05-06

## 2024-04-30 RX ORDER — 0.9 % SODIUM CHLORIDE 0.9 %
3 VIAL (ML) INJECTION PRN
OUTPATIENT
Start: 2024-05-05

## 2024-04-30 RX ORDER — 0.9 % SODIUM CHLORIDE 0.9 %
10 VIAL (ML) INJECTION PRN
OUTPATIENT
Start: 2024-05-06

## 2024-04-30 RX ORDER — SODIUM CHLORIDE 9 MG/ML
500 INJECTION, SOLUTION INTRAVENOUS ONCE
OUTPATIENT
Start: 2024-05-06

## 2024-04-30 RX ORDER — ONDANSETRON 8 MG/1
8 TABLET, ORALLY DISINTEGRATING ORAL PRN
OUTPATIENT
Start: 2024-05-06

## 2024-04-30 RX ORDER — 0.9 % SODIUM CHLORIDE 0.9 %
VIAL (ML) INJECTION PRN
OUTPATIENT
Start: 2024-05-05

## 2024-04-30 RX ORDER — ONDANSETRON 2 MG/ML
4 INJECTION INTRAMUSCULAR; INTRAVENOUS PRN
OUTPATIENT
Start: 2024-05-06

## 2024-05-01 ENCOUNTER — HOSPITAL ENCOUNTER (OUTPATIENT)
Dept: RADIATION ONCOLOGY | Facility: MEDICAL CENTER | Age: 69
End: 2024-05-01

## 2024-05-01 ENCOUNTER — HOSPITAL ENCOUNTER (OUTPATIENT)
Dept: RADIATION ONCOLOGY | Facility: MEDICAL CENTER | Age: 69
End: 2024-05-01
Attending: RADIOLOGY
Payer: MEDICARE

## 2024-05-01 VITALS
BODY MASS INDEX: 27.78 KG/M2 | TEMPERATURE: 98.5 F | WEIGHT: 177 LBS | OXYGEN SATURATION: 93 % | HEART RATE: 65 BPM | SYSTOLIC BLOOD PRESSURE: 117 MMHG | DIASTOLIC BLOOD PRESSURE: 67 MMHG

## 2024-05-01 LAB
CHEMOTHERAPY INFUSION START DATE: NORMAL
CHEMOTHERAPY RECORDS: 2
CHEMOTHERAPY RECORDS: 6600
CHEMOTHERAPY RECORDS: NORMAL
CHEMOTHERAPY RX CANCER: NORMAL
DATE 1ST CHEMO CANCER: NORMAL
RAD ONC ARIA COURSE LAST TREATMENT DATE: NORMAL
RAD ONC ARIA COURSE TREATMENT ELAPSED DAYS: NORMAL
RAD ONC ARIA REFERENCE POINT DOSAGE GIVEN TO DATE: 36
RAD ONC ARIA REFERENCE POINT ID: NORMAL
RAD ONC ARIA REFERENCE POINT SESSION DOSAGE GIVEN: 2

## 2024-05-01 PROCEDURE — 77014 PR CT GUIDANCE PLACEMENT RAD THERAPY FIELDS: CPT | Mod: 26 | Performed by: RADIOLOGY

## 2024-05-01 PROCEDURE — 77386 HCHG IMRT DELIVERY COMPLEX: CPT | Performed by: RADIOLOGY

## 2024-05-01 ASSESSMENT — FIBROSIS 4 INDEX: FIB4 SCORE: 1.67

## 2024-05-01 ASSESSMENT — PAIN SCALES - GENERAL: PAINLEVEL: 4=SLIGHT-MODERATE PAIN

## 2024-05-01 NOTE — PROGRESS NOTES
Patient continues to be followed by interdisciplinary Oncology Symptom Management Clinic team.  Patient's case discussed at weekly meeting on 5/1/24.    New interventions/additional follow up per this discussion include:      1.  RD continues to follow close - weight stable      2.  Week 4 of treatment this week        The patient will continue to be followed by Symptom Management Clinic team.

## 2024-05-01 NOTE — ON TREATMENT VISIT
ON TREATMENT  NOTE  RADIATION ONCOLOGY DEPARTMENT    Patient name:  Sarabjit Almendarez    Primary Physician:  Mundo Van M.D. MRN: [id number]  CSN: [id number]   Care Team:  Patient Care Team:  Mundo Van M.D. as PCP - General (Family Medicine)  Josefina Geronimo M.D. (Medical Oncology)  Philomena Olivas R.N. as Nurse Navigator  Michael JOHNSON M.D. as Radiation Oncologist (Radiation Oncology)  Don Hinkle M.D. (Family Medicine)  JAKOB Danielson (Medical Oncology)    : 1955, 68 y.o.     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 on 3/28/2024  Histopathologic type: Squamous cell carcinoma, NOS  Stage prefix: Initial diagnosis  Residual tumor (R): R0 - None  ECOG performance status: Grade 0  Karnofsky performance status: Score 100  Prognostic indicators: BERTRAM <2mm      TREATMENT SUMMARY:  Course First Treatment Date 2024  Course Last Treatment Date 2024  Radiation Therapy Episodes       Active Episodes       Radiation Therapy: IMRT (2024)                   Radiation Treatments         Plan Last Treated On Elapsed Days Fractions Treated Prescribed Fraction Dose (cGy) Prescribed Total Dose (cGy)    L Oroph H,N M 2024 23 @ [id number] 18 of 33 200 6,600                  Reference Point Last Treated On Elapsed Days Most Recent Session Dose (cGy) Total Dose (cGy)    L Oroph H,N 2024 23 @ [id number] 200 3,600                               SUBJECTIVE:  Sore throat 0/10 but 3/10 if turn to left and swallow hard. Dry mouth. Taste intact. Not using lidocaine. Eating solid food.    VITAL SIGNS:      2024     9:08 AM 2024     8:43 AM 2024     7:51 AM 2024     8:50 AM 2024     7:51 AM 2024     9:24 AM 2024     7:28 AM  "  Vitals   SYSTOLIC 117 119 122 120 102 116 102   DIASTOLIC 67 69 68 63 60 69 62   Pulse 65 78 89 65 75 68 77   Temperature 36.9 °C (98.5 °F) 36.7 °C (98 °F) 37.6 °C (99.7 °F) 36.9 °C (98.4 °F) 37.2 °C (99 °F) 37.1 °C (98.7 °F) 36.9 °C (98.5 °F)   Respiration  18  16 12 16 15   Weight 177 175.05 174.94 176.2 182 175.93 175.49   Height  1.7 m (5' 6.93\") 1.7 m (5' 6.93\")  1.7 m (5' 6.93\") 1.7 m (5' 6.93\") 1.7 m (5' 6.93\")   BMI 27.78 kg/m2 27.47 kg/m2 27.46 kg/m2 27.65 kg/m2 28.56 kg/m2 27.61 kg/m2 27.54 kg/m2   Pulse Oximetry 93 % 95 % 95 % 92 % 95 % 94 % 96 %     KPS: 80, Normal activity with effort; some signs or symptoms of disease (ECOG equivalent 1)  Encounter Vitals  Temperature: 36.9 °C (98.5 °F)  Temp src: Temporal  Blood Pressure : 117/67  Pulse: 65  Pulse Oximetry: 93 %  Weight: 80.3 kg (177 lb)  Pain Score: 4=Slight-Moderate Pain      5/1/2024     9:08 AM 4/24/2024     8:50 AM 4/22/2024     7:28 AM 4/17/2024     8:57 AM 4/10/2024     8:53 AM 4/2/2024    10:26 AM   Pain Assessment   Pain Score 4=SLIGHT DENAE 3=SLIGHT DENAE 3=SLIGHT DENAE NO PAIN NO PAIN MINIMAL PAIN   Pain Loc THROAT THROAT THROAT   THROAT          PHYSICAL EXAM:  Physical Exam  Vitals and nursing note reviewed.   Constitutional:       General: He is not in acute distress.     Appearance: He is well-developed.   HENT:      Head: Normocephalic.      Mouth/Throat:      Pharynx: Posterior oropharyngeal erythema (left soft palate) present.   Skin:     General: Skin is warm and dry.      Findings: No erythema.   Neurological:      Mental Status: He is alert and oriented to person, place, and time.   Psychiatric:         Behavior: Behavior normal.         Thought Content: Thought content normal.         Judgment: Judgment normal.              5/1/2024     9:09 AM 4/24/2024     8:51 AM 4/17/2024     8:59 AM 4/10/2024     8:54 AM   Toxicity Assessment   Toxicity Assessment Head/Neck Head/Neck Head/Neck Head/Neck   Fatigue (lethargy, malaise, asthenia) " None Increased fatigue over baseline, but not altering normal activities None None   Radiation Dermatitis None None None None   Rash/desquamation None None None None   Constipation None None None None   Dehydration None None None None   Mouth Dryness Moderate Mild Mild Normal   RT Dysphagia-Pharyngeal None None None None   Mucositis Erythema of mucosa None None None   Salivary Gland Changes None Sticky thickened saliva, may have slightly altered taste (e.g. metallic), additional fluids may be required Sticky thickened saliva, may have slightly altered taste (e.g. metallic), additional fluids may be required Sticky thickened saliva, may have slightly altered taste (e.g. metallic), additional fluids may be required   Stomatitis/Pharyngitis Painless ulcers, erythema, or mild soreness in the absence of lesions None None None   Taste Disturbance (dysgeusia) Slightly altered  Normal Normal   RT - Pain due to RT Mild pain not interfering with function Mild pain not interfering with function None None   Cough Absent Mild, relieved by non-prescription medication Absent Absent   Voice changes/stridor/larynx (hoarseness, loss of voice, laryngitis) Normal Mild or intermittent hoarseness Mild or intermittent hoarseness Normal       CURRENT MEDICATIONS:    Current Outpatient Medications:     Doxepin HCl 6 MG Tab, Take 6 mg by mouth at bedtime as needed (Insomnia)., Disp: 30 Tablet, Rfl: 2    Melatonin 10 MG Tab, Take 10 mg by mouth as needed., Disp: , Rfl:     ramelteon (ROZEREM) 8 MG tablet, Take 1 Tablet by mouth every evening. (Patient not taking: Reported on 4/23/2024), Disp: 30 Tablet, Rfl: 6    ondansetron (ZOFRAN) 4 MG Tab tablet, Take 1 Tablet by mouth every four hours as needed for Nausea/Vomiting (for nausea, vomiting)., Disp: 30 Tablet, Rfl: 6    prochlorperazine (COMPAZINE) 10 MG Tab, Take 1 Tablet by mouth every 6 hours as needed (for nausea, vomiting)., Disp: 30 Tablet, Rfl: 6    lidocaine (XYLOCAINE) 2 % Solution,  OK to mix with 1:1 Mylanta/Maalox - use 5mL solution every 1 hour PRN for oral discomfort, Disp: 100 mL, Rfl: 1    ezetimibe (ZETIA) 10 MG Tab, Take 1 Tablet by mouth at bedtime., Disp: 100 Tablet, Rfl: 3    metoprolol SR (TOPROL XL) 25 MG TABLET SR 24 HR, Take 1 Tablet by mouth every day., Disp: 90 Tablet, Rfl: 3    rosuvastatin (CRESTOR) 40 MG tablet, Take 1 Tablet by mouth at bedtime., Disp: 90 Tablet, Rfl: 3    aspirin EC (ECOTRIN) 81 MG TBEC, Take 81 mg by mouth every day., Disp: , Rfl:     LABORATORY DATA:   Lab Results   Component Value Date/Time    SODIUM 139 04/29/2024 08:53 AM    POTASSIUM 4.4 04/29/2024 08:53 AM    CHLORIDE 103 04/29/2024 08:53 AM    CO2 24 04/29/2024 08:53 AM    GLUCOSE 114 (H) 04/29/2024 08:53 AM    BUN 29 (H) 04/29/2024 08:53 AM    CREATININE 0.72 04/29/2024 08:53 AM    CREATININE 1.0 08/07/2006 10:20 AM    BUNCREATRAT 19 12/01/2023 10:40 AM    BUNCREATRAT 16 01/21/2015 08:52 AM       Lab Results   Component Value Date/Time    WBC 6.2 04/29/2024 08:53 AM    RBC 4.54 (L) 04/29/2024 08:53 AM    HEMOGLOBIN 13.3 (L) 04/29/2024 08:53 AM    HEMATOCRIT 38.2 (L) 04/29/2024 08:53 AM    MCV 84.1 04/29/2024 08:53 AM    MCH 29.3 04/29/2024 08:53 AM    MCHC 34.8 04/29/2024 08:53 AM    PLATELETCT 199 04/29/2024 08:53 AM         RADIOLOGY DATA:  No results found.    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 on 3/28/2024  Prognostic indicators: BERTRAM <2mm      PLAN:  No change in treatment plan    Disposition:  Treatment plan and imaging reviewed. Questions answered. Continue therapy outlined.     Michael JOHNSON M.D.    No orders of the defined types were placed in this encounter.

## 2024-05-02 ENCOUNTER — HOSPITAL ENCOUNTER (OUTPATIENT)
Dept: RADIATION ONCOLOGY | Facility: MEDICAL CENTER | Age: 69
End: 2024-05-02
Payer: MEDICARE

## 2024-05-02 LAB
CHEMOTHERAPY INFUSION START DATE: NORMAL
CHEMOTHERAPY RECORDS: 2
CHEMOTHERAPY RECORDS: 6600
CHEMOTHERAPY RECORDS: NORMAL
CHEMOTHERAPY RX CANCER: NORMAL
DATE 1ST CHEMO CANCER: NORMAL
RAD ONC ARIA COURSE LAST TREATMENT DATE: NORMAL
RAD ONC ARIA COURSE TREATMENT ELAPSED DAYS: NORMAL
RAD ONC ARIA REFERENCE POINT DOSAGE GIVEN TO DATE: 38
RAD ONC ARIA REFERENCE POINT ID: NORMAL
RAD ONC ARIA REFERENCE POINT SESSION DOSAGE GIVEN: 2

## 2024-05-02 PROCEDURE — 77014 PR CT GUIDANCE PLACEMENT RAD THERAPY FIELDS: CPT | Mod: 26 | Performed by: RADIOLOGY

## 2024-05-02 PROCEDURE — 77386 HCHG IMRT DELIVERY COMPLEX: CPT | Performed by: RADIOLOGY

## 2024-05-03 ENCOUNTER — HOSPITAL ENCOUNTER (OUTPATIENT)
Dept: RADIATION ONCOLOGY | Facility: MEDICAL CENTER | Age: 69
End: 2024-05-03
Payer: MEDICARE

## 2024-05-03 LAB
CHEMOTHERAPY INFUSION START DATE: NORMAL
CHEMOTHERAPY RECORDS: 2
CHEMOTHERAPY RECORDS: 6600
CHEMOTHERAPY RECORDS: NORMAL
CHEMOTHERAPY RX CANCER: NORMAL
DATE 1ST CHEMO CANCER: NORMAL
RAD ONC ARIA COURSE LAST TREATMENT DATE: NORMAL
RAD ONC ARIA COURSE TREATMENT ELAPSED DAYS: NORMAL
RAD ONC ARIA REFERENCE POINT DOSAGE GIVEN TO DATE: 40
RAD ONC ARIA REFERENCE POINT ID: NORMAL
RAD ONC ARIA REFERENCE POINT SESSION DOSAGE GIVEN: 2

## 2024-05-03 PROCEDURE — 77336 RADIATION PHYSICS CONSULT: CPT | Performed by: RADIOLOGY

## 2024-05-03 PROCEDURE — 77014 PR CT GUIDANCE PLACEMENT RAD THERAPY FIELDS: CPT | Mod: 26 | Performed by: RADIOLOGY

## 2024-05-03 PROCEDURE — 77386 HCHG IMRT DELIVERY COMPLEX: CPT | Performed by: RADIOLOGY

## 2024-05-03 PROCEDURE — 77427 RADIATION TX MANAGEMENT X5: CPT | Performed by: RADIOLOGY

## 2024-05-05 NOTE — PROGRESS NOTES
"Pharmacy Chemotherapy Calculation:    Dx: Squamous cell carcinoma of base of tongue, stage I, HPV mediated.          Protocol: CISplatin + XRT     *Dosing Reference*  CISplatin 40 mg/m2 IV over 60 min on Day 1   Repeat weekly x 7 cycles with radiation.     NCCN Guidelines for Head and Neck Cancers. V.3.2024.  [place] AT, et al.J Natl Cancer Inst.2005;97:536-539.  Alexandra G, et al. Suki Oncol.2012;23(2)L427-35.    Allergies:  Patient has no known allergies.     /67   Pulse 76   Temp 36.9 °C (98.4 °F) (Temporal)   Resp 16   Ht 1.7 m (5' 6.93\")   Wt 79.8 kg (175 lb 14.8 oz)   SpO2 95%   BMI 27.61 kg/m²  Body surface area is 1.94 meters squared.    Labs 5/6/24:  ANC~ 4050 Plt = 168 k   Hgb = 12.7     SCr = 0.78 mg/dL CrCl ~ 100 mL/min   Mag = 1.8  K+ = 4.3    Drug Order   (Drug name, dose, route, IV Fluid & volume, frequency, number of doses) Cycle 5  Previous treatment: C4 on 4/29/24     Medication = CISplatin  Base Dose = 40 mg/m2   Calc Dose: Base Dose x 1.94 m² = 77.6 mg  Final Dose = 78 mg  Route = IV  Fluid & Volume =  mL  Admin Duration = Over 60 min           <10% difference, okay to treat with final dose       By my signature below, I confirm this process was performed independently with the BSA and all final chemotherapy dosing calculations congruent. I have reviewed the above chemotherapy order and that my calculation of the final dose and BSA (when applicable) corroborate those calculations of the  pharmacist. Discrepancies of 10% or greater in the written dose have been addressed and documented within the [facility] Progress notes.    Fariba Massey, PharmD, BCOP    "

## 2024-05-06 ENCOUNTER — OUTPATIENT INFUSION SERVICES (OUTPATIENT)
Dept: ONCOLOGY | Facility: MEDICAL CENTER | Age: 69
End: 2024-05-06
Attending: PSYCHIATRY & NEUROLOGY
Payer: MEDICARE

## 2024-05-06 ENCOUNTER — HOSPITAL ENCOUNTER (OUTPATIENT)
Dept: RADIATION ONCOLOGY | Facility: MEDICAL CENTER | Age: 69
End: 2024-05-06

## 2024-05-06 ENCOUNTER — HOSPITAL ENCOUNTER (OUTPATIENT)
Dept: HEMATOLOGY ONCOLOGY | Facility: MEDICAL CENTER | Age: 69
End: 2024-05-06
Attending: NURSE PRACTITIONER
Payer: MEDICARE

## 2024-05-06 VITALS
BODY MASS INDEX: 27.61 KG/M2 | HEIGHT: 67 IN | DIASTOLIC BLOOD PRESSURE: 67 MMHG | WEIGHT: 175.93 LBS | TEMPERATURE: 98.4 F | HEART RATE: 76 BPM | OXYGEN SATURATION: 95 % | RESPIRATION RATE: 16 BRPM | SYSTOLIC BLOOD PRESSURE: 102 MMHG

## 2024-05-06 VITALS
SYSTOLIC BLOOD PRESSURE: 108 MMHG | WEIGHT: 169.64 LBS | DIASTOLIC BLOOD PRESSURE: 76 MMHG | HEART RATE: 85 BPM | OXYGEN SATURATION: 97 % | TEMPERATURE: 98.6 F | RESPIRATION RATE: 16 BRPM | BODY MASS INDEX: 26.63 KG/M2 | HEIGHT: 67 IN

## 2024-05-06 DIAGNOSIS — C01 SQUAMOUS CELL CARCINOMA OF BASE OF TONGUE (HCC): ICD-10-CM

## 2024-05-06 DIAGNOSIS — Z79.899 ENCOUNTER FOR LONG-TERM (CURRENT) USE OF HIGH-RISK MEDICATION: ICD-10-CM

## 2024-05-06 LAB
ANION GAP SERPL CALC-SCNC: 11 MMOL/L (ref 7–16)
BASOPHILS # BLD AUTO: 0.4 % (ref 0–1.8)
BASOPHILS # BLD: 0.02 K/UL (ref 0–0.12)
BUN SERPL-MCNC: 30 MG/DL (ref 8–22)
CALCIUM SERPL-MCNC: 9.5 MG/DL (ref 8.5–10.5)
CHEMOTHERAPY INFUSION START DATE: NORMAL
CHEMOTHERAPY RECORDS: 2
CHEMOTHERAPY RECORDS: 6600
CHEMOTHERAPY RECORDS: NORMAL
CHEMOTHERAPY RX CANCER: NORMAL
CHLORIDE SERPL-SCNC: 103 MMOL/L (ref 96–112)
CO2 SERPL-SCNC: 23 MMOL/L (ref 20–33)
CREAT SERPL-MCNC: 0.78 MG/DL (ref 0.5–1.4)
DATE 1ST CHEMO CANCER: NORMAL
EOSINOPHIL # BLD AUTO: 0.11 K/UL (ref 0–0.51)
EOSINOPHIL NFR BLD: 2.1 % (ref 0–6.9)
ERYTHROCYTE [DISTWIDTH] IN BLOOD BY AUTOMATED COUNT: 38.9 FL (ref 35.9–50)
GFR SERPLBLD CREATININE-BSD FMLA CKD-EPI: 96 ML/MIN/1.73 M 2
GLUCOSE SERPL-MCNC: 119 MG/DL (ref 65–99)
HCT VFR BLD AUTO: 36.2 % (ref 42–52)
HGB BLD-MCNC: 12.7 G/DL (ref 14–18)
IMM GRANULOCYTES # BLD AUTO: 0.02 K/UL (ref 0–0.11)
IMM GRANULOCYTES NFR BLD AUTO: 0.4 % (ref 0–0.9)
LYMPHOCYTES # BLD AUTO: 0.41 K/UL (ref 1–4.8)
LYMPHOCYTES NFR BLD: 8 % (ref 22–41)
MAGNESIUM SERPL-MCNC: 1.8 MG/DL (ref 1.5–2.5)
MCH RBC QN AUTO: 29.3 PG (ref 27–33)
MCHC RBC AUTO-ENTMCNC: 35.1 G/DL (ref 32.3–36.5)
MCV RBC AUTO: 83.6 FL (ref 81.4–97.8)
MONOCYTES # BLD AUTO: 0.51 K/UL (ref 0–0.85)
MONOCYTES NFR BLD AUTO: 10 % (ref 0–13.4)
NEUTROPHILS # BLD AUTO: 4.05 K/UL (ref 1.82–7.42)
NEUTROPHILS NFR BLD: 79.1 % (ref 44–72)
NRBC # BLD AUTO: 0 K/UL
NRBC BLD-RTO: 0 /100 WBC (ref 0–0.2)
OUTPT INFUS CBC COMMENT OICOM: ABNORMAL
PLATELET # BLD AUTO: 168 K/UL (ref 164–446)
PMV BLD AUTO: 9.8 FL (ref 9–12.9)
POTASSIUM SERPL-SCNC: 4.3 MMOL/L (ref 3.6–5.5)
RAD ONC ARIA COURSE LAST TREATMENT DATE: NORMAL
RAD ONC ARIA COURSE TREATMENT ELAPSED DAYS: NORMAL
RAD ONC ARIA REFERENCE POINT DOSAGE GIVEN TO DATE: 42
RAD ONC ARIA REFERENCE POINT ID: NORMAL
RAD ONC ARIA REFERENCE POINT SESSION DOSAGE GIVEN: 2
RBC # BLD AUTO: 4.33 M/UL (ref 4.7–6.1)
SODIUM SERPL-SCNC: 137 MMOL/L (ref 135–145)
WBC # BLD AUTO: 5.1 K/UL (ref 4.8–10.8)

## 2024-05-06 PROCEDURE — 77014 PR CT GUIDANCE PLACEMENT RAD THERAPY FIELDS: CPT | Mod: 26 | Performed by: RADIOLOGY

## 2024-05-06 PROCEDURE — 77386 HCHG IMRT DELIVERY COMPLEX: CPT | Performed by: RADIOLOGY

## 2024-05-06 PROCEDURE — 99214 OFFICE O/P EST MOD 30 MIN: CPT | Performed by: NURSE PRACTITIONER

## 2024-05-06 RX ORDER — SODIUM CHLORIDE 9 MG/ML
500 INJECTION, SOLUTION INTRAVENOUS ONCE
Status: COMPLETED | OUTPATIENT
Start: 2024-05-06 | End: 2024-05-06

## 2024-05-06 RX ADMIN — CISPLATIN 78 MG: 1 INJECTION INTRAVENOUS at 11:21

## 2024-05-06 RX ADMIN — DEXAMETHASONE SODIUM PHOSPHATE 12 MG: 4 INJECTION, SOLUTION INTRA-ARTICULAR; INTRALESIONAL; INTRAMUSCULAR; INTRAVENOUS; SOFT TISSUE at 10:05

## 2024-05-06 RX ADMIN — SODIUM CHLORIDE 500 ML: 9 INJECTION, SOLUTION INTRAVENOUS at 09:45

## 2024-05-06 RX ADMIN — MAGNESIUM SULFATE HEPTAHYDRATE: 500 INJECTION, SOLUTION INTRAMUSCULAR; INTRAVENOUS at 12:25

## 2024-05-06 RX ADMIN — ONDANSETRON 16 MG: 2 INJECTION INTRAMUSCULAR; INTRAVENOUS at 10:17

## 2024-05-06 RX ADMIN — FOSAPREPITANT 150 MG: 150 INJECTION, POWDER, LYOPHILIZED, FOR SOLUTION INTRAVENOUS at 10:34

## 2024-05-06 ASSESSMENT — ENCOUNTER SYMPTOMS
MYALGIAS: 0
FEVER: 0
WEIGHT LOSS: 1
COUGH: 0
SHORTNESS OF BREATH: 0
PALPITATIONS: 0
CONSTIPATION: 0
HEADACHES: 0
NAUSEA: 0
DIARRHEA: 0
DIZZINESS: 0
CHILLS: 0
TINGLING: 0
VOMITING: 0

## 2024-05-06 ASSESSMENT — FIBROSIS 4 INDEX
FIB4 SCORE: 1.67
FIB4 SCORE: 1.67

## 2024-05-06 ASSESSMENT — PAIN SCALES - GENERAL: PAINLEVEL: 3=SLIGHT PAIN

## 2024-05-06 NOTE — PROGRESS NOTES
Chemotherapy Verification - PRIMARY RN  C5 D1     Height = 170 cm  Weight = 79.8 kg  BSA = 1.94 m^2       Medication: cisplatin (PLATINOL)  Dose: 40 mg/m2  Calculated Dose: 77.6 mg                             (In mg/m2, AUC, mg/kg)       I confirm this process was performed independently with the BSA and all final chemotherapy dosing calculations congruent.  Any discrepancies of 10% or greater have been addressed with the chemotherapy pharmacist. The resolution of the discrepancy has been documented in the EPIC progress notes.

## 2024-05-06 NOTE — ADDENDUM NOTE
Encounter addended by: Lana Sanders, Med Ass't on: 5/6/2024 11:24 AM   Actions taken: Charge Capture section accepted

## 2024-05-06 NOTE — PROGRESS NOTES
Subjective     Sarabjit Almendarez is a 68 y.o. male who presents with Cancer (Juanpablo/Squamous cell carcinoma of base of tongue / pre chemo )          HPI    Patient seen today for evaluation prior to cycle 5 of chemotherapy employing weeklyi Cisplatin with concurrent XRT for tongue cancer, for continued monitoring of symptoms and side effects of cancer treatments.     Oncology history of presenting illness:  Patient states that he was golfing with a friend and at that time had discussed pain on the left side of his neck.  His friend who is a physician referred him to Dr. Silverio, ENT.  He was seen by ENT and underwent a CT of the soft tissue neck on 12/19/2023 which demonstrated left neck level 2 lymph nodes measuring 1.8 x 1.7 cm.  Dr. Silverio did proceed with a biopsy of the left cervical node which demonstrated epithelial elements most consistent with squamous cell carcinoma, p16 positive.  Patient was subsequently referred to [facility] and saw Dr. Maldonado on 1/29/2024.  PET scan done on 2/8/2024 did demonstrate a mass at the left base of the tongue with 2 metastatic left cervical lymph nodes.     He did undergo an excision of his left glossopharyngeal sulcus muscle and left neck dissection along with dental extraction of tooth #24 on 2/27/2024.  Pathology did confirm squamous cell carcinoma HPV associated with focal intra tumor all perineural invasion and monica metastasis.  There was tumor present at the deep tissue edge, 1.5 mm from the posterior mucosal edge.  Additional deep margins of the left constrictor muscle and left thigh localized cysts positive for carcinoma.  1 known level 2 node positive for metastatic squamous cell carcinoma.     Patient established with Dr. Shelby, radiation oncologist and Dr. Geronimo, medical oncologist.  Patient was initiated on treatment with concurrent chemo RT on 4/8/2024.     Treatment history:  02/27/24: Excision of his left glossopharyngeal sulcus muscle and left neck dissection  along with dental extraction of tooth 24   04/08/24: C1 Cisplatin  04/15/24: C2 Cisplatin   04/22/24: C3 Cisplatin  04/29/24: C4 Cisplatin  05/06/24: C5 Cisplatin     Interval history:  Patient overall is doing well.  He is still eating well, and getting in good amount of calories.  However he has lost some weight since last seen.  His energy level appears to be doing well and he is staying active.  He is able to get out and play golf some days.  He denies any nausea or vomiting.  Bowels are working well and urinating fine.  He denies any dizziness, headaches, peripheral neuropathy.  He denies any significant tinnitus with this past week.      No Known Allergies  Current Outpatient Medications on File Prior to Encounter   Medication Sig Dispense Refill    Doxepin HCl 6 MG Tab Take 6 mg by mouth at bedtime as needed (Insomnia). 30 Tablet 2    Melatonin 10 MG Tab Take 10 mg by mouth as needed.      ondansetron (ZOFRAN) 4 MG Tab tablet Take 1 Tablet by mouth every four hours as needed for Nausea/Vomiting (for nausea, vomiting). 30 Tablet 6    prochlorperazine (COMPAZINE) 10 MG Tab Take 1 Tablet by mouth every 6 hours as needed (for nausea, vomiting). 30 Tablet 6    lidocaine (XYLOCAINE) 2 % Solution OK to mix with 1:1 Mylanta/Maalox - use 5mL solution every 1 hour PRN for oral discomfort 100 mL 1    ezetimibe (ZETIA) 10 MG Tab Take 1 Tablet by mouth at bedtime. 100 Tablet 3    metoprolol SR (TOPROL XL) 25 MG TABLET SR 24 HR Take 1 Tablet by mouth every day. 90 Tablet 3    rosuvastatin (CRESTOR) 40 MG tablet Take 1 Tablet by mouth at bedtime. 90 Tablet 3    aspirin EC (ECOTRIN) 81 MG TBEC Take 81 mg by mouth every day.      ramelteon (ROZEREM) 8 MG tablet Take 1 Tablet by mouth every evening. (Patient not taking: Reported on 4/23/2024) 30 Tablet 6     No current facility-administered medications on file prior to encounter.         Review of Systems   Constitutional:  Positive for weight loss. Negative for chills, fever  "and malaise/fatigue.   HENT:  Negative for tinnitus.    Respiratory:  Negative for cough and shortness of breath.    Cardiovascular:  Negative for chest pain and palpitations.   Gastrointestinal:  Negative for constipation, diarrhea, nausea and vomiting.   Genitourinary:  Negative for dysuria.   Musculoskeletal:  Negative for myalgias.   Neurological:  Negative for dizziness, tingling and headaches.              Objective     /76 (BP Location: Right arm, Patient Position: Sitting, BP Cuff Size: Adult)   Pulse 85   Temp 37 °C (98.6 °F) (Temporal)   Resp 16   Ht 1.7 m (5' 6.93\")   Wt 76.9 kg (169 lb 10.3 oz)   SpO2 97%   BMI 26.63 kg/m²      Physical Exam  Vitals reviewed.   Constitutional:       General: He is not in acute distress.     Appearance: Normal appearance. He is not diaphoretic.   HENT:      Head: Normocephalic and atraumatic.   Cardiovascular:      Rate and Rhythm: Normal rate and regular rhythm.      Heart sounds: Normal heart sounds. No murmur heard.     No friction rub. No gallop.   Pulmonary:      Effort: Pulmonary effort is normal. No respiratory distress.      Breath sounds: Normal breath sounds. No wheezing.   Abdominal:      General: Bowel sounds are normal. There is no distension.      Palpations: Abdomen is soft.      Tenderness: There is no abdominal tenderness.   Musculoskeletal:         General: No swelling or tenderness. Normal range of motion.   Skin:     General: Skin is warm and dry.   Neurological:      Mental Status: He is alert and oriented to person, place, and time.   Psychiatric:         Mood and Affect: Mood normal.         Behavior: Behavior normal.                       Assessment & Plan       1. 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 Encounter for long-term (current) use of high-risk medication                1.  Patient with squamous cell carcinoma, p16 positive, oropharyngeal carcinoma currently on concurrent chemo RT employing weekly cisplatin. "  Patient is scheduled to proceed with cycle 5 today.  Clinically he is doing well and stable.  If labs meet parameters he is okay to proceed with treatment as planned.    2.  Patient to follow-up in the clinic in 1 week, or sooner if needed.      Please note that this dictation was created using voice recognition software. I have made every reasonable attempt to correct obvious errors, but I expect that there are errors of grammar and possibly content that I did not discover before finalizing the note.

## 2024-05-06 NOTE — PROGRESS NOTES
Sarabj[facility] for C5 D1 Cisplatin for squamous cell carcinoma of base of tongue. Patient denies acute health concerns. Denies s/s active infections, open wounds, and mouth sores. C/o throat discomfort r/t radiation to site. 22g PIV placed to LFA, which flushes easily and has brisk blood return. Labs drawn from PIV, reviewed, and meets parameters to proceed with treatment. Pre-hydration and premedications administered. Cisplatin then infused over 1 hour without adverse s/s. Patient tolerated treatment well. Post-hydration infused over 2 hours. PIV with brisk blood return post-chemo, flushed with 20 ml NS, and removed with tip intact. Patient has his next appt. Discharged home to self care in no apparent distress.

## 2024-05-07 ENCOUNTER — HOSPITAL ENCOUNTER (OUTPATIENT)
Dept: HEMATOLOGY ONCOLOGY | Facility: MEDICAL CENTER | Age: 69
End: 2024-05-07
Attending: FAMILY MEDICINE
Payer: MEDICARE

## 2024-05-07 ENCOUNTER — HOSPITAL ENCOUNTER (OUTPATIENT)
Dept: RADIATION ONCOLOGY | Facility: MEDICAL CENTER | Age: 69
End: 2024-05-07

## 2024-05-07 VITALS
OXYGEN SATURATION: 95 % | DIASTOLIC BLOOD PRESSURE: 62 MMHG | SYSTOLIC BLOOD PRESSURE: 106 MMHG | BODY MASS INDEX: 28.93 KG/M2 | WEIGHT: 180 LBS | HEIGHT: 66 IN | TEMPERATURE: 99.4 F | HEART RATE: 82 BPM

## 2024-05-07 DIAGNOSIS — C01 SQUAMOUS CELL CARCINOMA OF BASE OF TONGUE (HCC): ICD-10-CM

## 2024-05-07 LAB
CHEMOTHERAPY INFUSION START DATE: NORMAL
CHEMOTHERAPY RECORDS: 2
CHEMOTHERAPY RECORDS: 6600
CHEMOTHERAPY RECORDS: NORMAL
CHEMOTHERAPY RX CANCER: NORMAL
DATE 1ST CHEMO CANCER: NORMAL
RAD ONC ARIA COURSE LAST TREATMENT DATE: NORMAL
RAD ONC ARIA COURSE TREATMENT ELAPSED DAYS: NORMAL
RAD ONC ARIA REFERENCE POINT DOSAGE GIVEN TO DATE: 44
RAD ONC ARIA REFERENCE POINT ID: NORMAL
RAD ONC ARIA REFERENCE POINT SESSION DOSAGE GIVEN: 2

## 2024-05-07 PROCEDURE — 77386 HCHG IMRT DELIVERY COMPLEX: CPT | Performed by: RADIOLOGY

## 2024-05-07 PROCEDURE — 99213 OFFICE O/P EST LOW 20 MIN: CPT | Performed by: FAMILY MEDICINE

## 2024-05-07 PROCEDURE — 77014 PR CT GUIDANCE PLACEMENT RAD THERAPY FIELDS: CPT | Mod: 26 | Performed by: RADIOLOGY

## 2024-05-07 ASSESSMENT — ENCOUNTER SYMPTOMS
CONSTIPATION: 0
DIARRHEA: 0
COUGH: 0
VOMITING: 0
NAUSEA: 0
SORE THROAT: 1
SHORTNESS OF BREATH: 0
CHILLS: 0
PALPITATIONS: 0
FEVER: 0

## 2024-05-07 ASSESSMENT — FIBROSIS 4 INDEX: FIB4 SCORE: 1.97

## 2024-05-07 NOTE — ASSESSMENT & PLAN NOTE
Patient currently coming into week 5 of chemoradiation for head and neck cancer.  Has been having the expected worsening of symptoms.  Is not in need of opioid medication for pain management.  Has been able to keep up adequate p.o. intake as well as hydration.  Provided anticipatory guidance today on what to expect in the next couple weeks as well as the beginning of his recovery.  Will follow-up in 2 weeks to reevaluate symptoms.

## 2024-05-07 NOTE — PROGRESS NOTES
Subjective:     Chief Complaint   Patient presents with    Cancer     2 wk FV     Sarabjit Almendarez is a 68 y.o. male here today for follow-up on symptom management for oncological pain.  Patient is accompanied by his wife.  He reports he has had continuing worsening of symptoms including sore throat, dry mouth, and fatigue.  He is still able to eat many solid foods including chicken and is supplementing with protein shakes daily.  Is also using Biotene to help with dry mouth and is drinking water throughout the day along with liquid IV packets.  Does report appetite is struggling due to loss of taste.  Has been able to stay active including with his cough.  Did provide some anticipatory guidance about where he is at with symptoms and that they may likely have some worsening but the rate of change should be slowing and as he approaches recovery we expect him to be able to participate in his upcoming golf tournaments.  We concluded the visit with a plan to follow-up in 2 weeks for reevaluation.    Problem   Squamous Cell Carcinoma of Base of Tongue (Hcc)    Formatting of this note might be different from the original.   68 Year old male with cT1 cN1 cM0 p16+ squamous cell carcinoma of the left base of tongue.    S/F TRANSORAL NON-TONSIL SURGERY; ROBOTIC ASSISTED WITH/ WITHOUT LARYNGOSCOPY (Left)   NECK DISSECTION MODIFIED RADICAL (Left)   ROBOTIC DA AURELIO SP SINGLE CONSOLE; SECONDARY PROCEDURE   DENTAL EXTRACTION(S) on 02/27/2024          Current medicines (including changes today)  Current Outpatient Medications   Medication Sig Dispense Refill    Doxepin HCl 6 MG Tab Take 6 mg by mouth at bedtime as needed (Insomnia). 30 Tablet 2    Melatonin 10 MG Tab Take 10 mg by mouth as needed.      ondansetron (ZOFRAN) 4 MG Tab tablet Take 1 Tablet by mouth every four hours as needed for Nausea/Vomiting (for nausea, vomiting). 30 Tablet 6    prochlorperazine (COMPAZINE) 10 MG Tab Take 1 Tablet by mouth every 6 hours as  "needed (for nausea, vomiting). 30 Tablet 6    lidocaine (XYLOCAINE) 2 % Solution OK to mix with 1:1 Mylanta/Maalox - use 5mL solution every 1 hour PRN for oral discomfort 100 mL 1    ezetimibe (ZETIA) 10 MG Tab Take 1 Tablet by mouth at bedtime. 100 Tablet 3    metoprolol SR (TOPROL XL) 25 MG TABLET SR 24 HR Take 1 Tablet by mouth every day. 90 Tablet 3    rosuvastatin (CRESTOR) 40 MG tablet Take 1 Tablet by mouth at bedtime. 90 Tablet 3    aspirin EC (ECOTRIN) 81 MG TBEC Take 81 mg by mouth every day.      ramelteon (ROZEREM) 8 MG tablet Take 1 Tablet by mouth every evening. (Patient not taking: Reported on 5/7/2024) 30 Tablet 6     No current facility-administered medications for this encounter.       Social History     Tobacco Use    Smoking status: Never    Smokeless tobacco: Never   Vaping Use    Vaping Use: Never used   Substance Use Topics    Alcohol use: No    Drug use: Never     Past Medical History:   Diagnosis Date    Arrhythmia     CAD (coronary artery disease)     CABG     Hyperlipidemia     Palpitations     PVC's      Family History   Problem Relation Age of Onset    Heart Disease Other          Objective:     Vitals:    05/07/24 0818   BP: 106/62   Pulse: 82   Temp: 37.4 °C (99.4 °F)   TempSrc: Temporal   SpO2: 95%   Weight: 81.6 kg (180 lb)   Height: 1.676 m (5' 6\")     Body mass index is 29.05 kg/m².     Review of Systems   Constitutional:  Positive for malaise/fatigue. Negative for chills and fever.   HENT:  Positive for sore throat.    Respiratory:  Negative for cough and shortness of breath.    Cardiovascular:  Negative for chest pain and palpitations.   Gastrointestinal:  Negative for constipation, diarrhea, nausea and vomiting.     Physical Exam:   Gen:no acute distress.   Skin: Pink, warm, and dry, mild skin irritation bilateral neck.  HEENT: conjunctiva non-injected, sclera non-icteric. EOMs intact.   Nasal mucosa without edema nor erythema.   Pinna normal.    Oral mucous membranes dry  Neck: " Supple, trachea midline. No adenopathy or masses in the neck or supraclavicular regions.  Lungs: Effort is normal.   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  Has been having the expected worsening of symptoms.  Is not in need of opioid medication for pain management.  Has been able to keep up adequate p.o. intake as well as hydration.  Provided anticipatory guidance today on what to expect in the next couple weeks as well as the beginning of his recovery.  Will follow-up in 2 weeks to reevaluate symptoms.    26 minutes in total spent on patient encounter including chart review and consultation with patient's oncological providers.    Followup: Return in about 2 weeks (around 5/21/2024).    Don Hinkle M.D.

## 2024-05-08 ENCOUNTER — HOSPITAL ENCOUNTER (OUTPATIENT)
Dept: RADIATION ONCOLOGY | Facility: MEDICAL CENTER | Age: 69
End: 2024-05-08
Attending: RADIOLOGY
Payer: MEDICARE

## 2024-05-08 ENCOUNTER — HOSPITAL ENCOUNTER (OUTPATIENT)
Dept: RADIATION ONCOLOGY | Facility: MEDICAL CENTER | Age: 69
End: 2024-05-08
Payer: MEDICARE

## 2024-05-08 VITALS
RESPIRATION RATE: 16 BRPM | BODY MASS INDEX: 28.63 KG/M2 | WEIGHT: 177.4 LBS | TEMPERATURE: 98.1 F | DIASTOLIC BLOOD PRESSURE: 67 MMHG | SYSTOLIC BLOOD PRESSURE: 111 MMHG | OXYGEN SATURATION: 93 %

## 2024-05-08 LAB
CHEMOTHERAPY INFUSION START DATE: NORMAL
CHEMOTHERAPY RECORDS: 2
CHEMOTHERAPY RECORDS: 6600
CHEMOTHERAPY RECORDS: NORMAL
CHEMOTHERAPY RX CANCER: NORMAL
DATE 1ST CHEMO CANCER: NORMAL
RAD ONC ARIA COURSE LAST TREATMENT DATE: NORMAL
RAD ONC ARIA COURSE TREATMENT ELAPSED DAYS: NORMAL
RAD ONC ARIA REFERENCE POINT DOSAGE GIVEN TO DATE: 46
RAD ONC ARIA REFERENCE POINT ID: NORMAL
RAD ONC ARIA REFERENCE POINT SESSION DOSAGE GIVEN: 2

## 2024-05-08 PROCEDURE — 77014 PR CT GUIDANCE PLACEMENT RAD THERAPY FIELDS: CPT | Mod: 26 | Performed by: RADIOLOGY

## 2024-05-08 PROCEDURE — 77386 HCHG IMRT DELIVERY COMPLEX: CPT | Performed by: RADIOLOGY

## 2024-05-08 RX ORDER — 0.9 % SODIUM CHLORIDE 0.9 %
VIAL (ML) INJECTION PRN
Status: CANCELLED | OUTPATIENT
Start: 2024-05-12

## 2024-05-08 RX ORDER — EPINEPHRINE 1 MG/ML(1)
0.5 AMPUL (ML) INJECTION PRN
Status: CANCELLED | OUTPATIENT
Start: 2024-05-13

## 2024-05-08 RX ORDER — 0.9 % SODIUM CHLORIDE 0.9 %
3 VIAL (ML) INJECTION PRN
Status: CANCELLED | OUTPATIENT
Start: 2024-05-13

## 2024-05-08 RX ORDER — 0.9 % SODIUM CHLORIDE 0.9 %
10 VIAL (ML) INJECTION PRN
Status: CANCELLED | OUTPATIENT
Start: 2024-05-12

## 2024-05-08 RX ORDER — SODIUM CHLORIDE 9 MG/ML
INJECTION, SOLUTION INTRAVENOUS CONTINUOUS
Status: CANCELLED | OUTPATIENT
Start: 2024-05-13

## 2024-05-08 RX ORDER — ONDANSETRON 8 MG/1
8 TABLET, ORALLY DISINTEGRATING ORAL PRN
Status: CANCELLED | OUTPATIENT
Start: 2024-05-13

## 2024-05-08 RX ORDER — ONDANSETRON 2 MG/ML
4 INJECTION INTRAMUSCULAR; INTRAVENOUS PRN
Status: CANCELLED | OUTPATIENT
Start: 2024-05-13

## 2024-05-08 RX ORDER — 0.9 % SODIUM CHLORIDE 0.9 %
VIAL (ML) INJECTION PRN
Status: CANCELLED | OUTPATIENT
Start: 2024-05-13

## 2024-05-08 RX ORDER — 0.9 % SODIUM CHLORIDE 0.9 %
3 VIAL (ML) INJECTION PRN
Status: CANCELLED | OUTPATIENT
Start: 2024-05-12

## 2024-05-08 RX ORDER — PROCHLORPERAZINE MALEATE 10 MG
10 TABLET ORAL EVERY 6 HOURS PRN
Status: CANCELLED | OUTPATIENT
Start: 2024-05-13

## 2024-05-08 RX ORDER — 0.9 % SODIUM CHLORIDE 0.9 %
10 VIAL (ML) INJECTION PRN
Status: CANCELLED | OUTPATIENT
Start: 2024-05-13

## 2024-05-08 RX ORDER — SODIUM CHLORIDE 9 MG/ML
500 INJECTION, SOLUTION INTRAVENOUS ONCE
Status: CANCELLED | OUTPATIENT
Start: 2024-05-13

## 2024-05-08 RX ORDER — DIPHENHYDRAMINE HYDROCHLORIDE 50 MG/ML
50 INJECTION INTRAMUSCULAR; INTRAVENOUS PRN
Status: CANCELLED | OUTPATIENT
Start: 2024-05-13

## 2024-05-08 RX ORDER — METHYLPREDNISOLONE SODIUM SUCCINATE 125 MG/2ML
125 INJECTION, POWDER, LYOPHILIZED, FOR SOLUTION INTRAMUSCULAR; INTRAVENOUS PRN
Status: CANCELLED | OUTPATIENT
Start: 2024-05-13

## 2024-05-08 ASSESSMENT — FIBROSIS 4 INDEX: FIB4 SCORE: 1.97

## 2024-05-08 ASSESSMENT — PAIN SCALES - GENERAL: PAINLEVEL: 4=SLIGHT-MODERATE PAIN

## 2024-05-08 NOTE — ON TREATMENT VISIT
ON TREATMENT  NOTE  RADIATION ONCOLOGY DEPARTMENT    Patient name:  Sarabjit Almendarez    Primary Physician:  Mundo Van M.D. MRN: [id number]  CSN: [id number]   Care Team:  Patient Care Team:  Mundo Van M.D. as PCP - General (Family Medicine)  Josefina Geronimo M.D. (Medical Oncology)  Philomena Olivas R.N. as Nurse Navigator  Michael JOHNSON M.D. as Radiation Oncologist (Radiation Oncology)  Don Hinkle M.D. (Family Medicine)  JAKOB Danielson (Medical Oncology)    : 1955, 68 y.o.     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 on 3/28/2024  Histopathologic type: Squamous cell carcinoma, NOS  Stage prefix: Initial diagnosis  Residual tumor (R): R0 - None  ECOG performance status: Grade 0  Karnofsky performance status: Score 100  Prognostic indicators: BERTRAM <2mm      TREATMENT SUMMARY:  Course First Treatment Date 2024  Course Last Treatment Date 2024  Radiation Therapy Episodes       Active Episodes       Radiation Therapy: IMRT (2024)                   Radiation Treatments         Plan Last Treated On Elapsed Days Fractions Treated Prescribed Fraction Dose (cGy) Prescribed Total Dose (cGy)    L Oroph H,N M 2024 30 @ [id number] 23 of 33 200 6,600                  Reference Point Last Treated On Elapsed Days Most Recent Session Dose (cGy) Total Dose (cGy)    L Oroph H,N 2024 30 @ [id number] 200 4,600                               SUBJECTIVE:  Sore throat left side, Impaired taste. Golfing.    VITAL SIGNS:      2024     8:24 AM 2024     8:18 AM 2024     8:58 AM 2024     8:26 AM 2024     9:08 AM 2024     8:43 AM 2024     7:51 AM   Vitals   SYSTOLIC 111 106 102 108 117 119 122   DIASTOLIC 67 62 67 76 67 69 68  "  Pulse  82 76 85 65 78 89   Temperature 36.7 °C (98.1 °F) 37.4 °C (99.4 °F) 36.9 °C (98.4 °F) 37 °C (98.6 °F) 36.9 °C (98.5 °F) 36.7 °C (98 °F) 37.6 °C (99.7 °F)   Respiration 16  16 16  18    Weight 177.4 180 175.93 169.64 177 175.05 174.94   Height  1.676 m (5' 6\") 1.7 m (5' 6.93\") 1.7 m (5' 6.93\")  1.7 m (5' 6.93\") 1.7 m (5' 6.93\")   BMI 28.63 kg/m2 29.05 kg/m2 27.61 kg/m2 26.63 kg/m2 27.78 kg/m2 27.47 kg/m2 27.46 kg/m2   Pulse Oximetry 93 % 95 % 95 % 97 % 93 % 95 % 95 %     KPS: 80, Normal activity with effort; some signs or symptoms of disease (ECOG equivalent 1)  Encounter Vitals  Temperature: 36.7 °C (98.1 °F)  Blood Pressure : 111/67  Respiration: 16  Pulse Oximetry: 93 %  Weight: 80.5 kg (177 lb 6.4 oz)  Weight Source: Stand Up Scale  Pain Score: 4=Slight-Moderate Pain       WHO ORAL MUCOSITIS ASSESSMENT:  2: Moderate, Erythema, ulcers, can eat solids    PHYSICAL EXAM:  Physical Exam  Vitals and nursing note reviewed.   Constitutional:       General: He is not in acute distress.     Appearance: He is well-developed.   HENT:      Head: Normocephalic.      Mouth/Throat:      Mouth: Mucous membranes are moist.      Pharynx: Posterior oropharyngeal erythema present.   Skin:     General: Skin is warm and dry.      Findings: No erythema.   Neurological:      Mental Status: He is alert and oriented to person, place, and time.   Psychiatric:         Behavior: Behavior normal.         Thought Content: Thought content normal.         Judgment: Judgment normal.              5/8/2024     8:26 AM 5/1/2024     9:09 AM 4/24/2024     8:51 AM 4/17/2024     8:59 AM 4/10/2024     8:54 AM   Toxicity Assessment   Toxicity Assessment Head/Neck Head/Neck Head/Neck Head/Neck Head/Neck   Fatigue (lethargy, malaise, asthenia) Increased fatigue over baseline, but not altering normal activities None Increased fatigue over baseline, but not altering normal activities None None   Radiation Dermatitis Faint erythema or dry desquamation " None None None None   Rash/desquamation None None None None None   Constipation None None None None None   Dehydration None None None None None   Mouth Dryness Moderate Moderate Mild Mild Normal   RT Dysphagia-Pharyngeal None None None None None   Mucositis None Erythema of mucosa None None None   Salivary Gland Changes Sticky thickened saliva, may have slightly altered taste (e.g. metallic), additional fluids may be required None Sticky thickened saliva, may have slightly altered taste (e.g. metallic), additional fluids may be required Sticky thickened saliva, may have slightly altered taste (e.g. metallic), additional fluids may be required Sticky thickened saliva, may have slightly altered taste (e.g. metallic), additional fluids may be required   Stomatitis/Pharyngitis None Painless ulcers, erythema, or mild soreness in the absence of lesions None None None   Taste Disturbance (dysgeusia) Slightly altered Slightly altered  Normal Normal   RT - Pain due to RT Mild pain not interfering with function Mild pain not interfering with function Mild pain not interfering with function None None   Cough Mild, relieved by non-prescription medication Absent Mild, relieved by non-prescription medication Absent Absent   Voice changes/stridor/larynx (hoarseness, loss of voice, laryngitis) Mild or intermittent hoarseness Normal Mild or intermittent hoarseness Mild or intermittent hoarseness Normal       CURRENT MEDICATIONS:    Current Outpatient Medications:     Doxepin HCl 6 MG Tab, Take 6 mg by mouth at bedtime as needed (Insomnia)., Disp: 30 Tablet, Rfl: 2    Melatonin 10 MG Tab, Take 10 mg by mouth as needed., Disp: , Rfl:     ramelteon (ROZEREM) 8 MG tablet, Take 1 Tablet by mouth every evening. (Patient not taking: Reported on 5/7/2024), Disp: 30 Tablet, Rfl: 6    ondansetron (ZOFRAN) 4 MG Tab tablet, Take 1 Tablet by mouth every four hours as needed for Nausea/Vomiting (for nausea, vomiting)., Disp: 30 Tablet, Rfl: 6     prochlorperazine (COMPAZINE) 10 MG Tab, Take 1 Tablet by mouth every 6 hours as needed (for nausea, vomiting)., Disp: 30 Tablet, Rfl: 6    lidocaine (XYLOCAINE) 2 % Solution, OK to mix with 1:1 Mylanta/Maalox - use 5mL solution every 1 hour PRN for oral discomfort, Disp: 100 mL, Rfl: 1    ezetimibe (ZETIA) 10 MG Tab, Take 1 Tablet by mouth at bedtime., Disp: 100 Tablet, Rfl: 3    metoprolol SR (TOPROL XL) 25 MG TABLET SR 24 HR, Take 1 Tablet by mouth every day., Disp: 90 Tablet, Rfl: 3    rosuvastatin (CRESTOR) 40 MG tablet, Take 1 Tablet by mouth at bedtime., Disp: 90 Tablet, Rfl: 3    aspirin EC (ECOTRIN) 81 MG TBEC, Take 81 mg by mouth every day., Disp: , Rfl:     LABORATORY DATA:   Lab Results   Component Value Date/Time    SODIUM 137 05/06/2024 09:11 AM    POTASSIUM 4.3 05/06/2024 09:11 AM    CHLORIDE 103 05/06/2024 09:11 AM    CO2 23 05/06/2024 09:11 AM    GLUCOSE 119 (H) 05/06/2024 09:11 AM    BUN 30 (H) 05/06/2024 09:11 AM    CREATININE 0.78 05/06/2024 09:11 AM    CREATININE 1.0 08/07/2006 10:20 AM    BUNCREATRAT 19 12/01/2023 10:40 AM    BUNCREATRAT 16 01/21/2015 08:52 AM       Lab Results   Component Value Date/Time    WBC 5.1 05/06/2024 09:11 AM    RBC 4.33 (L) 05/06/2024 09:11 AM    HEMOGLOBIN 12.7 (L) 05/06/2024 09:11 AM    HEMATOCRIT 36.2 (L) 05/06/2024 09:11 AM    MCV 83.6 05/06/2024 09:11 AM    MCH 29.3 05/06/2024 09:11 AM    MCHC 35.1 05/06/2024 09:11 AM    PLATELETCT 168 05/06/2024 09:11 AM         RADIOLOGY DATA:  No results found.    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 on 3/28/2024  Prognostic indicators: BERTRAM <2mm      PLAN:  No change in treatment plan    Disposition:  Treatment plan and imaging reviewed. Questions answered. Continue therapy outlined.     Michael JOHNSON M.D.    No  orders of the defined types were placed in this encounter.

## 2024-05-09 ENCOUNTER — HOSPITAL ENCOUNTER (OUTPATIENT)
Dept: RADIATION ONCOLOGY | Facility: MEDICAL CENTER | Age: 69
End: 2024-05-09
Payer: MEDICARE

## 2024-05-09 LAB
CHEMOTHERAPY INFUSION START DATE: NORMAL
CHEMOTHERAPY RECORDS: 2
CHEMOTHERAPY RECORDS: 6600
CHEMOTHERAPY RECORDS: NORMAL
CHEMOTHERAPY RX CANCER: NORMAL
DATE 1ST CHEMO CANCER: NORMAL
RAD ONC ARIA COURSE LAST TREATMENT DATE: NORMAL
RAD ONC ARIA COURSE TREATMENT ELAPSED DAYS: NORMAL
RAD ONC ARIA REFERENCE POINT DOSAGE GIVEN TO DATE: 48
RAD ONC ARIA REFERENCE POINT ID: NORMAL
RAD ONC ARIA REFERENCE POINT SESSION DOSAGE GIVEN: 2

## 2024-05-09 PROCEDURE — 77014 PR CT GUIDANCE PLACEMENT RAD THERAPY FIELDS: CPT | Mod: 26 | Performed by: RADIOLOGY

## 2024-05-09 PROCEDURE — 77386 HCHG IMRT DELIVERY COMPLEX: CPT | Performed by: RADIOLOGY

## 2024-05-10 ENCOUNTER — HOSPITAL ENCOUNTER (OUTPATIENT)
Dept: RADIATION ONCOLOGY | Facility: MEDICAL CENTER | Age: 69
End: 2024-05-10
Payer: MEDICARE

## 2024-05-10 LAB
CHEMOTHERAPY INFUSION START DATE: NORMAL
CHEMOTHERAPY RECORDS: 2
CHEMOTHERAPY RECORDS: 6600
CHEMOTHERAPY RECORDS: NORMAL
CHEMOTHERAPY RX CANCER: NORMAL
DATE 1ST CHEMO CANCER: NORMAL
RAD ONC ARIA COURSE LAST TREATMENT DATE: NORMAL
RAD ONC ARIA COURSE TREATMENT ELAPSED DAYS: NORMAL
RAD ONC ARIA REFERENCE POINT DOSAGE GIVEN TO DATE: 50
RAD ONC ARIA REFERENCE POINT ID: NORMAL
RAD ONC ARIA REFERENCE POINT SESSION DOSAGE GIVEN: 2

## 2024-05-10 PROCEDURE — 77014 PR CT GUIDANCE PLACEMENT RAD THERAPY FIELDS: CPT | Mod: 26 | Performed by: RADIOLOGY

## 2024-05-10 PROCEDURE — 77336 RADIATION PHYSICS CONSULT: CPT | Performed by: RADIOLOGY

## 2024-05-10 PROCEDURE — 77427 RADIATION TX MANAGEMENT X5: CPT | Performed by: RADIOLOGY

## 2024-05-10 PROCEDURE — 77386 HCHG IMRT DELIVERY COMPLEX: CPT | Performed by: RADIOLOGY

## 2024-05-12 NOTE — PROGRESS NOTES
"Pharmacy Chemotherapy Calculations    Dx: Cancer of base of tongue  Cycle 6  Previous treatment = C5 5/6/24    Protocol: Weekly Cisplatin with concurrent radiation  Cisplatin 40 mg/m2 IV over 60 minutes   7-day cycle for 6 cycles with concurrent radiation  NCCN Guidelines for Head and Neck Cancers V.2.2024.  Shruthi JA, et al. Radiother Oncol. 2006;79(1):34-38.  Milton GK, et al. Head Neck. 2005;27(1):36-43.  Lexis V, et al. J Clin Oncol. 2018;36(11):5483-8903.  Juma MEI, et al. Int J Radiat Oncol Biol Phys. 1996;36(5):999-1004.    Allergies:  Patient has no known allergies.       /68   Pulse 75   Temp 37.1 °C (98.7 °F) (Temporal)   Resp 18   Ht 1.7 m (5' 6.93\")   Wt 77.9 kg (171 lb 11.8 oz)   SpO2 94%   BMI 26.96 kg/m²  Body surface area is 1.92 meters squared.    Labs 5/13/24:  ANC~ 4270 Plt = 141k   Hgb = 11.7     SCr = 0.77 mg/dL CrCl ~ 99 mL/min   K = 4.7  Mag = 1.7 - replaced per protocol    Cisplatin 40 mg/m² x 1.92 m² = 76.8 mg   <10% difference, OK to treat with final dose = 78 mg    Pato Glass, PharmD  "

## 2024-05-13 ENCOUNTER — HOSPITAL ENCOUNTER (OUTPATIENT)
Dept: HEMATOLOGY ONCOLOGY | Facility: MEDICAL CENTER | Age: 69
End: 2024-05-13
Attending: STUDENT IN AN ORGANIZED HEALTH CARE EDUCATION/TRAINING PROGRAM
Payer: MEDICARE

## 2024-05-13 ENCOUNTER — HOSPITAL ENCOUNTER (OUTPATIENT)
Dept: RADIATION ONCOLOGY | Facility: MEDICAL CENTER | Age: 69
End: 2024-05-13

## 2024-05-13 ENCOUNTER — OUTPATIENT INFUSION SERVICES (OUTPATIENT)
Dept: ONCOLOGY | Facility: MEDICAL CENTER | Age: 69
End: 2024-05-13
Attending: PSYCHIATRY & NEUROLOGY
Payer: MEDICARE

## 2024-05-13 VITALS
BODY MASS INDEX: 26.96 KG/M2 | WEIGHT: 171.74 LBS | SYSTOLIC BLOOD PRESSURE: 113 MMHG | HEIGHT: 67 IN | OXYGEN SATURATION: 94 % | RESPIRATION RATE: 18 BRPM | DIASTOLIC BLOOD PRESSURE: 68 MMHG | HEART RATE: 75 BPM | TEMPERATURE: 98.7 F

## 2024-05-13 VITALS
OXYGEN SATURATION: 92 % | TEMPERATURE: 100 F | BODY MASS INDEX: 27.69 KG/M2 | SYSTOLIC BLOOD PRESSURE: 92 MMHG | DIASTOLIC BLOOD PRESSURE: 60 MMHG | HEIGHT: 66 IN | WEIGHT: 172.29 LBS | HEART RATE: 87 BPM

## 2024-05-13 DIAGNOSIS — C01 SQUAMOUS CELL CARCINOMA OF BASE OF TONGUE (HCC): ICD-10-CM

## 2024-05-13 DIAGNOSIS — Z79.899 ENCOUNTER FOR LONG-TERM (CURRENT) USE OF HIGH-RISK MEDICATION: ICD-10-CM

## 2024-05-13 LAB
ANION GAP SERPL CALC-SCNC: 12 MMOL/L (ref 7–16)
BASOPHILS # BLD AUTO: 0.2 % (ref 0–1.8)
BASOPHILS # BLD: 0.01 K/UL (ref 0–0.12)
BUN SERPL-MCNC: 29 MG/DL (ref 8–22)
CALCIUM SERPL-MCNC: 9.3 MG/DL (ref 8.5–10.5)
CHEMOTHERAPY INFUSION START DATE: NORMAL
CHEMOTHERAPY RECORDS: 2
CHEMOTHERAPY RECORDS: 6600
CHEMOTHERAPY RECORDS: NORMAL
CHEMOTHERAPY RX CANCER: NORMAL
CHLORIDE SERPL-SCNC: 103 MMOL/L (ref 96–112)
CO2 SERPL-SCNC: 22 MMOL/L (ref 20–33)
CREAT SERPL-MCNC: 0.77 MG/DL (ref 0.5–1.4)
DATE 1ST CHEMO CANCER: NORMAL
EOSINOPHIL # BLD AUTO: 0.06 K/UL (ref 0–0.51)
EOSINOPHIL NFR BLD: 1.1 % (ref 0–6.9)
ERYTHROCYTE [DISTWIDTH] IN BLOOD BY AUTOMATED COUNT: 40.1 FL (ref 35.9–50)
GFR SERPLBLD CREATININE-BSD FMLA CKD-EPI: 97 ML/MIN/1.73 M 2
GLUCOSE SERPL-MCNC: 100 MG/DL (ref 65–99)
HCT VFR BLD AUTO: 33.9 % (ref 42–52)
HGB BLD-MCNC: 11.7 G/DL (ref 14–18)
IMM GRANULOCYTES # BLD AUTO: 0.02 K/UL (ref 0–0.11)
IMM GRANULOCYTES NFR BLD AUTO: 0.4 % (ref 0–0.9)
LYMPHOCYTES # BLD AUTO: 0.4 K/UL (ref 1–4.8)
LYMPHOCYTES NFR BLD: 7.5 % (ref 22–41)
MAGNESIUM SERPL-MCNC: 1.7 MG/DL (ref 1.5–2.5)
MCH RBC QN AUTO: 28.9 PG (ref 27–33)
MCHC RBC AUTO-ENTMCNC: 34.5 G/DL (ref 32.3–36.5)
MCV RBC AUTO: 83.7 FL (ref 81.4–97.8)
MONOCYTES # BLD AUTO: 0.54 K/UL (ref 0–0.85)
MONOCYTES NFR BLD AUTO: 10.2 % (ref 0–13.4)
NEUTROPHILS # BLD AUTO: 4.27 K/UL (ref 1.82–7.42)
NEUTROPHILS NFR BLD: 80.6 % (ref 44–72)
NRBC # BLD AUTO: 0 K/UL
NRBC BLD-RTO: 0 /100 WBC (ref 0–0.2)
OUTPT INFUS CBC COMMENT OICOM: ABNORMAL
PLATELET # BLD AUTO: 141 K/UL (ref 164–446)
PMV BLD AUTO: 9.8 FL (ref 9–12.9)
POTASSIUM SERPL-SCNC: 4.7 MMOL/L (ref 3.6–5.5)
RAD ONC ARIA COURSE LAST TREATMENT DATE: NORMAL
RAD ONC ARIA COURSE TREATMENT ELAPSED DAYS: NORMAL
RAD ONC ARIA REFERENCE POINT DOSAGE GIVEN TO DATE: 52
RAD ONC ARIA REFERENCE POINT ID: NORMAL
RAD ONC ARIA REFERENCE POINT SESSION DOSAGE GIVEN: 2
RBC # BLD AUTO: 4.05 M/UL (ref 4.7–6.1)
SODIUM SERPL-SCNC: 137 MMOL/L (ref 135–145)
WBC # BLD AUTO: 5.3 K/UL (ref 4.8–10.8)

## 2024-05-13 PROCEDURE — 77386 HCHG IMRT DELIVERY COMPLEX: CPT | Performed by: RADIOLOGY

## 2024-05-13 PROCEDURE — 77014 PR CT GUIDANCE PLACEMENT RAD THERAPY FIELDS: CPT | Mod: 26 | Performed by: RADIOLOGY

## 2024-05-13 PROCEDURE — 99214 OFFICE O/P EST MOD 30 MIN: CPT | Performed by: STUDENT IN AN ORGANIZED HEALTH CARE EDUCATION/TRAINING PROGRAM

## 2024-05-13 RX ORDER — SODIUM CHLORIDE 9 MG/ML
500 INJECTION, SOLUTION INTRAVENOUS ONCE
Status: COMPLETED | OUTPATIENT
Start: 2024-05-13 | End: 2024-05-13

## 2024-05-13 RX ORDER — MAGNESIUM SULFATE 1 G/100ML
1 INJECTION INTRAVENOUS ONCE
Status: COMPLETED | OUTPATIENT
Start: 2024-05-13 | End: 2024-05-13

## 2024-05-13 RX ADMIN — CISPLATIN 78 MG: 1 INJECTION INTRAVENOUS at 11:09

## 2024-05-13 RX ADMIN — FOSAPREPITANT 150 MG: 150 INJECTION, POWDER, LYOPHILIZED, FOR SOLUTION INTRAVENOUS at 10:38

## 2024-05-13 RX ADMIN — SODIUM CHLORIDE 500 ML: 9 INJECTION, SOLUTION INTRAVENOUS at 09:16

## 2024-05-13 RX ADMIN — DEXAMETHASONE SODIUM PHOSPHATE 12 MG: 4 INJECTION, SOLUTION INTRA-ARTICULAR; INTRALESIONAL; INTRAMUSCULAR; INTRAVENOUS; SOFT TISSUE at 10:10

## 2024-05-13 RX ADMIN — POTASSIUM CHLORIDE: 2 INJECTION, SOLUTION, CONCENTRATE INTRAVENOUS at 12:16

## 2024-05-13 RX ADMIN — ONDANSETRON 16 MG: 2 INJECTION INTRAMUSCULAR; INTRAVENOUS at 10:21

## 2024-05-13 RX ADMIN — MAGNESIUM SULFATE 1 G: 1 INJECTION INTRAVENOUS at 12:15

## 2024-05-13 ASSESSMENT — ENCOUNTER SYMPTOMS
DEPRESSION: 0
FOCAL WEAKNESS: 0
SENSORY CHANGE: 0
BLURRED VISION: 0
CHILLS: 0
WHEEZING: 0
BRUISES/BLEEDS EASILY: 0
NAUSEA: 0
DIZZINESS: 0
HEADACHES: 0
SPUTUM PRODUCTION: 0
SORE THROAT: 0
HEARTBURN: 0
MEMORY LOSS: 0
TINGLING: 0
NECK PAIN: 0
SHORTNESS OF BREATH: 0
ORTHOPNEA: 0
WEIGHT LOSS: 1
FEVER: 0
ABDOMINAL PAIN: 0
COUGH: 0
PALPITATIONS: 0
TREMORS: 0
VOMITING: 0

## 2024-05-13 ASSESSMENT — FIBROSIS 4 INDEX
FIB4 SCORE: 1.97
FIB4 SCORE: 1.97

## 2024-05-13 NOTE — PROGRESS NOTES
"Pharmacy Chemotherapy Calculation:    Dx: Squamous cell carcinoma of base of tongue, stage I, HPV mediated.          Protocol: CISplatin + XRT     *Dosing Reference*  CISplatin 40 mg/m2 IV over 60 min on Day 1   Repeat weekly x 7 cycles with radiation.     NCCN Guidelines for Head and Neck Cancers. V.3.2024.  [place] AT, et al.J Natl Cancer Inst.2005;97:536-539.  Alexandra G, et al. Suki Oncol.2012;23(2)L427-35.    Allergies:  Patient has no known allergies.     /68   Pulse 75   Temp 37.1 °C (98.7 °F) (Temporal)   Resp 18   Ht 1.7 m (5' 6.93\")   Wt 77.9 kg (171 lb 11.8 oz)   SpO2 94%   BMI 26.96 kg/m²  Body surface area is 1.92 meters squared.\    Labs 5/13/24:  ANC~ 4270 Plt = 141k   Hgb = 11.7     SCr = 0.77 mg/dL CrCl ~ 99 mL/min   Mag = 1.7  K+ = 4.7    Drug Order   (Drug name, dose, route, IV Fluid & volume, frequency, number of doses) Cycle 6  Previous treatment: C5 on 5/6/24     Medication = CISplatin  Base Dose = 40 mg/m2   Calc Dose: Base Dose x 1.92 m² = 76.8 mg  Final Dose = 78 mg  Route = IV  Fluid & Volume =  mL  Admin Duration = Over 60 min           <10% difference, okay to treat with final dose       By my signature below, I confirm this process was performed independently with the BSA and all final chemotherapy dosing calculations congruent. I have reviewed the above chemotherapy order and that my calculation of the final dose and BSA (when applicable) corroborate those calculations of the  pharmacist. Discrepancies of 10% or greater in the written dose have been addressed and documented within the [facility] Progress notes.    Fariba Massey, PharmD, BCOP    "

## 2024-05-13 NOTE — PROGRESS NOTES
Chemotherapy Verification - SECONDARY RN       Height = 170 cm  Weight = 77.9 kg  BSA = 1.92 m2       Medication: Cisplatin  Dose: 40 mg/m2  Calculated Dose: 76.8 mg                             (In mg/m2, AUC, mg/kg)     I confirm that this process was performed independently.

## 2024-05-13 NOTE — PROGRESS NOTES
Consult Note: Hematology/Oncology     Referring Provider: Dr. Heron SUAREZ  Primary Care:  Mundo Van M.D.    Chief Complaint   Patient presents with    Cancer     Prechemo        Current Treatment:     02/27/24: Excision of his left glossopharyngeal sulcus muscle and left neck dissection along with dental extraction of tooth 24   04/08/24: C1 Cisplatin  04/15/24: C2 Cisplatin   04/22/24: C3 Cisplatin  04/29/24: C4 Cisplatin  05/06/24: C5 Cisplatin  05/13/24: C6 Cisplatin      Subjective:   History of Presenting Illness:  Sarabjit Almendarez is a 68 y.o. 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    Patient reports that he was golfing and told a friend about pain on the left side of neck.  Friend is a physician who referred him to Dr. Silverio.  He had a CT of the soft tissue neck on December 19, 2023 which demonstrated left neck level 2 lymph nodes measuring 1.8 x 1.7 cm.  He saw Dr. Silverio who conducted a biopsy of the left cervical lymph node which demonstrated epithelial elements most consistent with squamous cell carcinoma p16 positive.  He was referred to [facility] and saw Dr. Madlonado on January 29, 2024.  He had a PET scan done on February 8, 2024 which demonstrated a mass at the left base of the tongue with 2 metastatic left cervical lymph nodes.    On February 27, 2024 he underwent a an excision of his left glossopharyngeal sulcus muscle and left neck dissection along with dental extraction of tooth 24.  Pathology demonstrated squamous cell carcinoma HPV associated with focal intra tumor all perineural invasion and monica metastases.  There was tumor present at the deep tissue edge 1.5 mm from the posterior mucosal edge.  Additional deep margins of the left constrictor muscle and left thigh localized cysts positive for carcinoma.  1 known level 2 level node positive for metastatic squamous cell    Is currently just over 1 month post surgery.  He reports doing very  well and had some dysphagia after surgery and pain but is overall doing well.  He is nervous for the treatment that is ahead of him.    Presents with Altagracia his wife    Interval History    Sarabjit is here prior to C6.    He reports fatigue is improving.  He reports appetite has improved but he has some nausea. He has been using zofran.  He has a metallic taste with everything that he eats.      No PN.     He reports that his abdomen is painful specifically in his epigastric area.  He noted that there is a lump there that has been there since he was constipated        Past Medical History:   Diagnosis Date    Arrhythmia     CAD (coronary artery disease)     CABG     Hyperlipidemia     Palpitations     PVC's        Past Surgical History:   Procedure Laterality Date    MULTIPLE CORONARY ARTERY BYPASS      TONSILLECTOMY Bilateral     ZZZ CARDIAC CATH         Social History     Tobacco Use    Smoking status: Never    Smokeless tobacco: Never   Vaping Use    Vaping Use: Never used   Substance Use Topics    Alcohol use: No    Drug use: Never        Family History   Problem Relation Age of Onset    Heart Disease Other        No Known Allergies    Current Outpatient Medications   Medication Sig Dispense Refill    Doxepin HCl 6 MG Tab Take 6 mg by mouth at bedtime as needed (Insomnia). 30 Tablet 2    Melatonin 10 MG Tab Take 10 mg by mouth as needed.      ondansetron (ZOFRAN) 4 MG Tab tablet Take 1 Tablet by mouth every four hours as needed for Nausea/Vomiting (for nausea, vomiting). 30 Tablet 6    prochlorperazine (COMPAZINE) 10 MG Tab Take 1 Tablet by mouth every 6 hours as needed (for nausea, vomiting). 30 Tablet 6    lidocaine (XYLOCAINE) 2 % Solution OK to mix with 1:1 Mylanta/Maalox - use 5mL solution every 1 hour PRN for oral discomfort 100 mL 1    ezetimibe (ZETIA) 10 MG Tab Take 1 Tablet by mouth at bedtime. 100 Tablet 3    metoprolol SR (TOPROL XL) 25 MG TABLET SR 24 HR Take 1 Tablet by mouth every day. 90 Tablet 3  "   rosuvastatin (CRESTOR) 40 MG tablet Take 1 Tablet by mouth at bedtime. 90 Tablet 3    aspirin EC (ECOTRIN) 81 MG TBEC Take 81 mg by mouth every day.      ramelteon (ROZEREM) 8 MG tablet Take 1 Tablet by mouth every evening. (Patient not taking: Reported on 5/7/2024) 30 Tablet 6     No current facility-administered medications for this encounter.       Review of Systems   Constitutional:  Positive for malaise/fatigue and weight loss. Negative for chills and fever.   HENT:  Negative for congestion, ear pain, nosebleeds and sore throat.         Voice changes   Eyes:  Negative for blurred vision.   Respiratory:  Negative for cough, sputum production, shortness of breath and wheezing.    Cardiovascular:  Negative for chest pain, palpitations, orthopnea and leg swelling.   Gastrointestinal:  Negative for abdominal pain, heartburn, nausea and vomiting.   Genitourinary:  Negative for dysuria, frequency and urgency.   Musculoskeletal:  Negative for neck pain.   Neurological:  Negative for dizziness, tingling, tremors, sensory change, focal weakness and headaches.   Endo/Heme/Allergies:  Does not bruise/bleed easily.   Psychiatric/Behavioral:  Negative for depression, memory loss and suicidal ideas.    All other systems reviewed and are negative.      Problem list, medications, and allergies reviewed by myself today in Epic.     Objective:     Vitals:    05/13/24 0812   BP: 92/60   BP Location: Right arm   Patient Position: Sitting   BP Cuff Size: Adult   Pulse: 87   Temp: 37.8 °C (100 °F)   TempSrc: Temporal   SpO2: 92%   Weight: 78.1 kg (172 lb 4.6 oz)   Height: 1.676 m (5' 5.98\")       DESC; KARNOFSKY SCALE WITH ECOG EQUIVALENT: 90, Able to carry on normal activity; minor signs or symptoms of disease (ECOG equivalent 0)    DISTRESS LEVEL: no apparent distress    Physical Exam  Constitutional:       General: He is not in acute distress.     Appearance: Normal appearance.   HENT:      Head: Normocephalic and atraumatic. "      Nose: Nose normal. No congestion.      Mouth/Throat:      Mouth: Mucous membranes are moist.      Pharynx: Oropharynx is clear.   Eyes:      General: No scleral icterus.     Conjunctiva/sclera: Conjunctivae normal.      Pupils: Pupils are equal, round, and reactive to light.   Neck:      Comments: L scar, well healing   Cardiovascular:      Rate and Rhythm: Normal rate and regular rhythm.      Pulses: Normal pulses.      Heart sounds: No murmur heard.     No friction rub.   Pulmonary:      Effort: Pulmonary effort is normal. No respiratory distress.      Breath sounds: Normal breath sounds. No stridor. No wheezing or rales.   Chest:      Chest wall: No tenderness.   Abdominal:      General: Abdomen is flat. Bowel sounds are normal. There is no distension.      Palpations: Abdomen is soft. There is no mass.      Tenderness: There is no abdominal tenderness. There is no guarding or rebound.   Musculoskeletal:         General: No swelling, tenderness or deformity. Normal range of motion.      Cervical back: Normal range of motion and neck supple. No rigidity or tenderness.      Right lower leg: No edema.      Left lower leg: No edema.   Lymphadenopathy:      Cervical: No cervical adenopathy.   Skin:     General: Skin is warm.      Coloration: Skin is not jaundiced or pale.      Findings: No bruising or rash.   Neurological:      General: No focal deficit present.      Mental Status: He is alert and oriented to person, place, and time. Mental status is at baseline.      Motor: No weakness.   Psychiatric:         Mood and Affect: Mood normal.         Behavior: Behavior normal.         Thought Content: Thought content normal.         Judgment: Judgment normal.         Labs:   Most recent labs reviewed.  Please see the lab tab of chart review    Imaging:   Most recent images below have been independently reviewed by me.  Please see the imaging tab of chart review    Pathology:  2/27/24  A. 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 on 3/28/2024       Mr. Almendarez is a 67 yo M presents with stage I HPV mediated oropharyngeal carcinoma.    He is s/p C5 of cisplatin    Plan  -continue with C6, pending labs    Chemo Regimen  7 day cycle for 6 cycles with concurrent RT     Cisplatin 40mg/m2 IV over 60 minutes on Day 1     References   NCCN Guidelines for HN Cancer V.1.2021  Shruthi JA et al. Radiother oncol 2006;79(1):34-38  Milton GUEVARA et al. HN 2005;27(1):36-43    2.  Insomnia  ->30 min onset to fall asleep  -continue melatonin   -did not  doxepin    3.  Abdominal Pain  -Specifically epigastric pain  -Will get an ultrasound  -Virtual visit with me afterwards        No follow-ups on file.     Any questions and concerns raised by the patient were addressed and answered. Patient denies any further questions.  Patient encouraged to call the office with any concerns or issues.     Josefina Geronimo M.D.  Hematology/Oncology

## 2024-05-13 NOTE — ADDENDUM NOTE
Encounter addended by: Horace Crow on: 5/13/2024 8:42 AM   Actions taken: Charge Capture section accepted

## 2024-05-13 NOTE — PROGRESS NOTES
Patient came into infusion independently. Orders and vitals reviewed, assessment done. PIV established with blood return noted. Labs collected and reviewed, patient meets parameters to proceed with treatment today. Electrolytes replaced per [facility] protocol. Cycle 6 day 1 of Cisplatin treatment given as ordered with no adverse events. PIV flushed and removed with tip intact. Patient left in stable condition with next appointment confirmed.

## 2024-05-13 NOTE — PROGRESS NOTES
Chemotherapy Verification - PRIMARY RN      Height = 1.7m  Weight = 77.9kg  BSA = 1.92m2       Medication: CISplatin (Platinol)  Dose: 40mg/m2  Calculated Dose: 76.8mg                             (In mg/m2)           I confirm this process was performed independently with the BSA and all final chemotherapy dosing calculations congruent.  Any discrepancies of 10% or greater have been addressed with the chemotherapy pharmacist. The resolution of the discrepancy has been documented in the EPIC progress notes.

## 2024-05-14 ENCOUNTER — HOSPITAL ENCOUNTER (OUTPATIENT)
Dept: RADIATION ONCOLOGY | Facility: MEDICAL CENTER | Age: 69
End: 2024-05-14

## 2024-05-14 ENCOUNTER — HOSPITAL ENCOUNTER (OUTPATIENT)
Dept: RADIATION ONCOLOGY | Facility: MEDICAL CENTER | Age: 69
End: 2024-05-14
Attending: RADIOLOGY
Payer: MEDICARE

## 2024-05-14 VITALS
WEIGHT: 173 LBS | SYSTOLIC BLOOD PRESSURE: 110 MMHG | TEMPERATURE: 98.7 F | OXYGEN SATURATION: 94 % | HEART RATE: 79 BPM | BODY MASS INDEX: 27.15 KG/M2 | DIASTOLIC BLOOD PRESSURE: 65 MMHG

## 2024-05-14 LAB
CHEMOTHERAPY INFUSION START DATE: NORMAL
CHEMOTHERAPY RECORDS: 2
CHEMOTHERAPY RECORDS: 6600
CHEMOTHERAPY RECORDS: NORMAL
CHEMOTHERAPY RX CANCER: NORMAL
DATE 1ST CHEMO CANCER: NORMAL
RAD ONC ARIA COURSE LAST TREATMENT DATE: NORMAL
RAD ONC ARIA COURSE TREATMENT ELAPSED DAYS: NORMAL
RAD ONC ARIA REFERENCE POINT DOSAGE GIVEN TO DATE: 54
RAD ONC ARIA REFERENCE POINT ID: NORMAL
RAD ONC ARIA REFERENCE POINT SESSION DOSAGE GIVEN: 2

## 2024-05-14 PROCEDURE — 77386 HCHG IMRT DELIVERY COMPLEX: CPT | Performed by: RADIOLOGY

## 2024-05-14 PROCEDURE — 77014 PR CT GUIDANCE PLACEMENT RAD THERAPY FIELDS: CPT | Mod: 26 | Performed by: RADIOLOGY

## 2024-05-14 ASSESSMENT — PAIN SCALES - GENERAL: PAINLEVEL: NO PAIN

## 2024-05-14 ASSESSMENT — FIBROSIS 4 INDEX: FIB4 SCORE: 2.35

## 2024-05-14 NOTE — ON TREATMENT VISIT
ON TREATMENT NOTE  RADIATION ONCOLOGY DEPARTMENT    Patient name:  Sarabjit Almendarez    Primary Physician:  Mundo Van M.D. MRN: [id number]  CSN: [id number]   Referring physician:  No ref. provider found : 1955, 68 y.o.     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 on 3/28/2024  Histopathologic type: Squamous cell carcinoma, NOS  Stage prefix: Initial diagnosis  Residual tumor (R): R0 - None  ECOG performance status: Grade 0  Karnofsky performance status: Score 100  Prognostic indicators: BERTRAM <2mm      TREATMENT SUMMARY:  [facility] Treatment Information          2024   Aria Course Treatment Dates   Course First Treatment Date 2024    Course Last Treatment Date 2024    [facility] Treatment Summary   L Oroph H,N M  Plan from Course [id number]   Fraction 27 of 33   Elapsed Course Days 36 @ [id number]   Prescribed Fraction Dose 200 cGy   Prescribed Total Dose 6,600 cGy   L Oroph H,N  Reference Point from Course [id number]   Elapsed Course Days 36 @ [id number]   Session Dose 200 cGy   Total Dose 5,400 cGy      Radiation Treatments       Active   Plans   L Oroph H,N M   Most recent treatment: Dose planned: 200 cGy (fraction 27 of 33 on 2024)   Total: Dose planned: 6,600 cGy   Elapsed Days: 36 @            Historical   No historical radiation treatments to show.               SUBJECTIVE:   Patient feels he is doing well.  His main complaint is lack of taste.  He does not describe any significant pain or discomfort.    VITAL SIGNS:    Encounter Vitals  Temperature: 37.1 °C (98.7 °F)  Temp src: Temporal  Blood Pressure : 110/65  Pulse: 79  Pulse Oximetry: 94 %  Weight: 78.5 kg (173 lb)  Pain Score: No pain      2024     7:56 AM 2024     8:24 AM  5/6/2024     8:26 AM 5/1/2024     9:08 AM 4/24/2024     8:50 AM 4/22/2024     7:28 AM   Pain Assessment   Pain Score NO PAIN 4=SLIGHT DENAE 3=SLIGHT DENAE 4=SLIGHT DENAE 3=SLIGHT DENAE 3=SLIGHT DENAE   Pain Loc  THROAT THROAT THROAT THROAT THROAT          PHYSICAL EXAM:  Physical Exam mild mucositis    TOXICITY      5/14/2024     7:58 AM 5/8/2024     8:26 AM 5/1/2024     9:09 AM 4/24/2024     8:51 AM 4/17/2024     8:59 AM 4/10/2024     8:54 AM   Toxicity Assessment   Toxicity Assessment Head/Neck Head/Neck Head/Neck Head/Neck Head/Neck Head/Neck   Fatigue (lethargy, malaise, asthenia) Increased fatigue over baseline, but not altering normal activities Increased fatigue over baseline, but not altering normal activities None Increased fatigue over baseline, but not altering normal activities None None   Radiation Dermatitis Faint erythema or dry desquamation Faint erythema or dry desquamation None None None None   Rash/desquamation Macular or papular eruption or erythema without associated symptoms None None None None None   Constipation None None None None None None   Dehydration None None None None None None   Mouth Dryness Moderate Moderate Moderate Mild Mild Normal   RT Dysphagia-Pharyngeal None None None None None None   Mucositis None None Erythema of mucosa None None None   Salivary Gland Changes Sticky thickened saliva, may have slightly altered taste (e.g. metallic), additional fluids may be required Sticky thickened saliva, may have slightly altered taste (e.g. metallic), additional fluids may be required None Sticky thickened saliva, may have slightly altered taste (e.g. metallic), additional fluids may be required Sticky thickened saliva, may have slightly altered taste (e.g. metallic), additional fluids may be required Sticky thickened saliva, may have slightly altered taste (e.g. metallic), additional fluids may be required   Stomatitis/Pharyngitis None None Painless ulcers, erythema, or mild soreness in the  absence of lesions None None None   Taste Disturbance (dysgeusia) Markedly altered Slightly altered Slightly altered  Normal Normal   RT - Pain due to RT None Mild pain not interfering with function Mild pain not interfering with function Mild pain not interfering with function None None   Cough Absent Mild, relieved by non-prescription medication Absent Mild, relieved by non-prescription medication Absent Absent   Voice changes/stridor/larynx (hoarseness, loss of voice, laryngitis) Normal Mild or intermittent hoarseness Normal Mild or intermittent hoarseness Mild or intermittent hoarseness Normal         IMPRESSION:  Cancer Staging   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 on 3/28/2024  Prognostic indicators: BERTRAM <2mm      PLAN:  No change in treatment plan    Disposition:  Treatment plan reviewed. Questions answered. Continue therapy outlined.     Sarabjit Antunez M.D.    No orders of the defined types were placed in this encounter.

## 2024-05-15 ENCOUNTER — TELEPHONE (OUTPATIENT)
Dept: RADIATION ONCOLOGY | Facility: MEDICAL CENTER | Age: 69
End: 2024-05-15
Payer: MEDICARE

## 2024-05-15 ENCOUNTER — HOSPITAL ENCOUNTER (OUTPATIENT)
Dept: RADIATION ONCOLOGY | Facility: MEDICAL CENTER | Age: 69
End: 2024-05-15
Payer: MEDICARE

## 2024-05-15 LAB
CHEMOTHERAPY INFUSION START DATE: NORMAL
CHEMOTHERAPY RECORDS: 2
CHEMOTHERAPY RECORDS: 6600
CHEMOTHERAPY RECORDS: NORMAL
CHEMOTHERAPY RX CANCER: NORMAL
DATE 1ST CHEMO CANCER: NORMAL
RAD ONC ARIA COURSE LAST TREATMENT DATE: NORMAL
RAD ONC ARIA COURSE TREATMENT ELAPSED DAYS: NORMAL
RAD ONC ARIA REFERENCE POINT DOSAGE GIVEN TO DATE: 56
RAD ONC ARIA REFERENCE POINT ID: NORMAL
RAD ONC ARIA REFERENCE POINT SESSION DOSAGE GIVEN: 2

## 2024-05-15 PROCEDURE — 77386 HCHG IMRT DELIVERY COMPLEX: CPT | Performed by: RADIOLOGY

## 2024-05-15 PROCEDURE — 77014 PR CT GUIDANCE PLACEMENT RAD THERAPY FIELDS: CPT | Mod: 26 | Performed by: RADIOLOGY

## 2024-05-15 NOTE — TELEPHONE ENCOUNTER
Nutrition Services: Telephone Encounter   Wt Readings from Last 7 Encounters:   05/14/24 78.5 kg (173 lb)   05/13/24 78.1 kg (172 lb 4.6 oz)   05/13/24 77.9 kg (171 lb 11.8 oz)   05/08/24 80.5 kg (177 lb 6.4 oz)   05/07/24 81.6 kg (180 lb)   05/06/24 76.9 kg (169 lb 10.3 oz)   05/06/24 79.8 kg (175 lb 14.8 oz)     Weight Change: potential 2 kg loss within past 1 week. This is a 2.4% loss and meets criteria for severity.     RD called for nutrition check-in, though pt did not answer. RD left voice message with contact information for callback as desired.     Please contact [phone]

## 2024-05-15 NOTE — PROGRESS NOTES
Patient continues to be followed by interdisciplinary Oncology Symptom Management Clinic team.  Patient's case discussed at weekly meeting on 5/15/24.    New interventions/additional follow up per this discussion include:      1.  Week 6 this week: some nausea      2.  US of epigastric pain is pending      3.  Palliative f/v continues - f/v after needs scheduled      4.  RD to f/v for side effect review    The patient will continue to be followed by Symptom Management Clinic team.

## 2024-05-16 ENCOUNTER — HOSPITAL ENCOUNTER (OUTPATIENT)
Dept: RADIATION ONCOLOGY | Facility: MEDICAL CENTER | Age: 69
End: 2024-05-16
Payer: MEDICARE

## 2024-05-16 LAB
CHEMOTHERAPY INFUSION START DATE: NORMAL
CHEMOTHERAPY RECORDS: 2
CHEMOTHERAPY RECORDS: 6600
CHEMOTHERAPY RECORDS: NORMAL
CHEMOTHERAPY RX CANCER: NORMAL
DATE 1ST CHEMO CANCER: NORMAL
RAD ONC ARIA COURSE LAST TREATMENT DATE: NORMAL
RAD ONC ARIA COURSE TREATMENT ELAPSED DAYS: NORMAL
RAD ONC ARIA REFERENCE POINT DOSAGE GIVEN TO DATE: 58
RAD ONC ARIA REFERENCE POINT ID: NORMAL
RAD ONC ARIA REFERENCE POINT SESSION DOSAGE GIVEN: 2

## 2024-05-16 PROCEDURE — 77014 PR CT GUIDANCE PLACEMENT RAD THERAPY FIELDS: CPT | Mod: 26 | Performed by: RADIOLOGY

## 2024-05-16 PROCEDURE — 77386 HCHG IMRT DELIVERY COMPLEX: CPT | Performed by: RADIOLOGY

## 2024-05-17 ENCOUNTER — HOSPITAL ENCOUNTER (OUTPATIENT)
Dept: RADIATION ONCOLOGY | Facility: MEDICAL CENTER | Age: 69
End: 2024-05-17
Payer: MEDICARE

## 2024-05-17 LAB
CHEMOTHERAPY INFUSION START DATE: NORMAL
CHEMOTHERAPY RECORDS: 2
CHEMOTHERAPY RECORDS: 6600
CHEMOTHERAPY RECORDS: NORMAL
CHEMOTHERAPY RX CANCER: NORMAL
DATE 1ST CHEMO CANCER: NORMAL
RAD ONC ARIA COURSE LAST TREATMENT DATE: NORMAL
RAD ONC ARIA COURSE TREATMENT ELAPSED DAYS: NORMAL
RAD ONC ARIA REFERENCE POINT DOSAGE GIVEN TO DATE: 60
RAD ONC ARIA REFERENCE POINT ID: NORMAL
RAD ONC ARIA REFERENCE POINT SESSION DOSAGE GIVEN: 2

## 2024-05-17 PROCEDURE — 77427 RADIATION TX MANAGEMENT X5: CPT | Performed by: RADIOLOGY

## 2024-05-17 PROCEDURE — 77014 PR CT GUIDANCE PLACEMENT RAD THERAPY FIELDS: CPT | Mod: 26 | Performed by: RADIOLOGY

## 2024-05-17 PROCEDURE — 77386 HCHG IMRT DELIVERY COMPLEX: CPT | Performed by: RADIOLOGY

## 2024-05-17 PROCEDURE — 77336 RADIATION PHYSICS CONSULT: CPT | Performed by: RADIOLOGY

## 2024-05-20 ENCOUNTER — HOSPITAL ENCOUNTER (OUTPATIENT)
Dept: RADIATION ONCOLOGY | Facility: MEDICAL CENTER | Age: 69
End: 2024-05-20
Payer: MEDICARE

## 2024-05-20 ENCOUNTER — PATIENT OUTREACH (OUTPATIENT)
Dept: ONCOLOGY | Facility: MEDICAL CENTER | Age: 69
End: 2024-05-20
Payer: MEDICARE

## 2024-05-20 LAB
CHEMOTHERAPY INFUSION START DATE: NORMAL
CHEMOTHERAPY RECORDS: 2
CHEMOTHERAPY RECORDS: 6600
CHEMOTHERAPY RECORDS: NORMAL
CHEMOTHERAPY RX CANCER: NORMAL
DATE 1ST CHEMO CANCER: NORMAL
RAD ONC ARIA COURSE LAST TREATMENT DATE: NORMAL
RAD ONC ARIA COURSE TREATMENT ELAPSED DAYS: NORMAL
RAD ONC ARIA REFERENCE POINT DOSAGE GIVEN TO DATE: 62
RAD ONC ARIA REFERENCE POINT ID: NORMAL
RAD ONC ARIA REFERENCE POINT SESSION DOSAGE GIVEN: 2

## 2024-05-20 PROCEDURE — 77014 PR CT GUIDANCE PLACEMENT RAD THERAPY FIELDS: CPT | Mod: 26 | Performed by: RADIOLOGY

## 2024-05-20 PROCEDURE — 77386 HCHG IMRT DELIVERY COMPLEX: CPT | Performed by: RADIOLOGY

## 2024-05-20 NOTE — PROGRESS NOTES
On May 20, 2024, Oncology Social Worker Maria Guadalupe Baldwin processed pt[facility]s [facility] [place] [employer] application.  Pt. will receive $100 in gas card to assist with transportation.  OSW Denny left gas card at Radiation Oncology  for pt. to .

## 2024-05-21 ENCOUNTER — HOSPITAL ENCOUNTER (OUTPATIENT)
Dept: RADIATION ONCOLOGY | Facility: MEDICAL CENTER | Age: 69
End: 2024-05-21

## 2024-05-21 ENCOUNTER — HOSPITAL ENCOUNTER (OUTPATIENT)
Dept: RADIATION ONCOLOGY | Facility: MEDICAL CENTER | Age: 69
End: 2024-05-21
Attending: RADIOLOGY
Payer: MEDICARE

## 2024-05-21 ENCOUNTER — HOSPITAL ENCOUNTER (OUTPATIENT)
Dept: HEMATOLOGY ONCOLOGY | Facility: MEDICAL CENTER | Age: 69
End: 2024-05-21
Attending: FAMILY MEDICINE
Payer: MEDICARE

## 2024-05-21 VITALS
HEIGHT: 66 IN | HEART RATE: 75 BPM | WEIGHT: 166.8 LBS | TEMPERATURE: 98.3 F | DIASTOLIC BLOOD PRESSURE: 64 MMHG | SYSTOLIC BLOOD PRESSURE: 104 MMHG | OXYGEN SATURATION: 93 % | BODY MASS INDEX: 26.81 KG/M2

## 2024-05-21 VITALS
HEART RATE: 77 BPM | WEIGHT: 164.3 LBS | OXYGEN SATURATION: 95 % | BODY MASS INDEX: 25.79 KG/M2 | SYSTOLIC BLOOD PRESSURE: 131 MMHG | RESPIRATION RATE: 16 BRPM | DIASTOLIC BLOOD PRESSURE: 75 MMHG | TEMPERATURE: 97.6 F

## 2024-05-21 DIAGNOSIS — C01 SQUAMOUS CELL CARCINOMA OF BASE OF TONGUE (HCC): ICD-10-CM

## 2024-05-21 LAB
CHEMOTHERAPY INFUSION START DATE: NORMAL
CHEMOTHERAPY RECORDS: 2
CHEMOTHERAPY RECORDS: 6600
CHEMOTHERAPY RECORDS: NORMAL
CHEMOTHERAPY RX CANCER: NORMAL
DATE 1ST CHEMO CANCER: NORMAL
RAD ONC ARIA COURSE LAST TREATMENT DATE: NORMAL
RAD ONC ARIA COURSE TREATMENT ELAPSED DAYS: NORMAL
RAD ONC ARIA REFERENCE POINT DOSAGE GIVEN TO DATE: 64
RAD ONC ARIA REFERENCE POINT ID: NORMAL
RAD ONC ARIA REFERENCE POINT SESSION DOSAGE GIVEN: 2

## 2024-05-21 PROCEDURE — 77386 HCHG IMRT DELIVERY COMPLEX: CPT | Performed by: RADIOLOGY

## 2024-05-21 PROCEDURE — 99213 OFFICE O/P EST LOW 20 MIN: CPT | Performed by: FAMILY MEDICINE

## 2024-05-21 PROCEDURE — 77014 PR CT GUIDANCE PLACEMENT RAD THERAPY FIELDS: CPT | Mod: 26 | Performed by: RADIOLOGY

## 2024-05-21 ASSESSMENT — PAIN SCALES - GENERAL: PAINLEVEL: 1=MINIMAL PAIN

## 2024-05-21 ASSESSMENT — FIBROSIS 4 INDEX
FIB4 SCORE: 2.35
FIB4 SCORE: 2.35

## 2024-05-21 NOTE — ON TREATMENT VISIT
ON TREATMENT NOTE  RADIATION ONCOLOGY DEPARTMENT    Patient name:  Sarabjit Almendarez    Primary Physician:  Mundo Van M.D. MRN: [id number]  CSN: [id number]   Referring physician:  No ref. provider found : 1955, 68 y.o.     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 on 3/28/2024  Histopathologic type: Squamous cell carcinoma, NOS  Stage prefix: Initial diagnosis  Residual tumor (R): R0 - None  ECOG performance status: Grade 0  Karnofsky performance status: Score 100  Prognostic indicators: BERTRAM <2mm      TREATMENT SUMMARY:  [facility] Treatment Information          2024   Aria Course Treatment Dates   Course First Treatment Date 2024    Course Last Treatment Date 2024    [facility] Treatment Summary   L Oroph H,N M  Plan from Course C1_[id number]   Fraction 32 of 33   Elapsed Course Days 43 @ [id number]   Prescribed Fraction Dose 200 cGy   Prescribed Total Dose 6,600 cGy   L Oroph H,N  Reference Point from Course [id number]   Elapsed Course Days 43 @ [id number]   Session Dose 200 cGy   Total Dose 6,400 cGy      Radiation Treatments       Active   Plans   L Oroph H,N M   Most recent treatment: Dose planned: 200 cGy (fraction 32 of 33 on 2024)   Total: Dose planned: 6,600 cGy   Elapsed Days: 43 @            Historical   No historical radiation treatments to show.               SUBJECTIVE:   Patient is doing remarkably well.  He states he has very minimal dysphagia.  His major difficulty comes with some nausea once he has eaten.  His appetite is fair but diminished.  He feels that is more related to the taste rather than actual appetite.  He does not wish to pursue new any further medications.  He will complete treatment tomorrow.    VITAL SIGNS:    Encounter  Vitals  Temperature: 36.4 °C (97.6 °F)  Blood Pressure : 131/75  Pulse: 77  Respiration: 16  Pulse Oximetry: 95 %  Weight: 74.5 kg (164 lb 4.8 oz)  Weight Source: Stand Up Scale  Pain Score: 1=Minimal Pain      5/21/2024     7:57 AM 5/14/2024     7:56 AM 5/8/2024     8:24 AM 5/6/2024     8:26 AM 5/1/2024     9:08 AM 4/24/2024     8:50 AM   Pain Assessment   Pain Score MINIMAL PAIN NO PAIN 4=SLIGHT DENAE 3=SLIGHT DENAE 4=SLIGHT DENAE 3=SLIGHT DENAE   Pain Loc THROAT  THROAT THROAT THROAT THROAT          PHYSICAL EXAM:  Physical Exam minimal mucositis and erythema    TOXICITY      5/21/2024     7:59 AM 5/14/2024     7:58 AM 5/8/2024     8:26 AM 5/1/2024     9:09 AM 4/24/2024     8:51 AM 4/17/2024     8:59 AM 4/10/2024     8:54 AM   Toxicity Assessment   Toxicity Assessment Head/Neck Head/Neck Head/Neck Head/Neck Head/Neck Head/Neck Head/Neck   Fatigue (lethargy, malaise, asthenia) Increased fatigue over baseline, but not altering normal activities Increased fatigue over baseline, but not altering normal activities Increased fatigue over baseline, but not altering normal activities None Increased fatigue over baseline, but not altering normal activities None None   Radiation Dermatitis Moderate to brisk erythema or a patchy moist desquamation, mostly confined to skin folds and creases, with/without moderate edema Faint erythema or dry desquamation Faint erythema or dry desquamation None None None None   Rash/desquamation None Macular or papular eruption or erythema without associated symptoms None None None None None   Constipation None None None None None None None   Dehydration Dry mucous membranes and/or diminished skin turgor None None None None None None   Mouth Dryness Moderate Moderate Moderate Moderate Mild Mild Normal   RT Dysphagia-Pharyngeal Mild dysphagia, but can eat regular diet None None None None None None   Mucositis None None None Erythema of mucosa None None None   Salivary Gland Changes Thick, ropy, sticky  saliva and/or markedly altered taste and alteration in diet required Sticky thickened saliva, may have slightly altered taste (e.g. metallic), additional fluids may be required Sticky thickened saliva, may have slightly altered taste (e.g. metallic), additional fluids may be required None Sticky thickened saliva, may have slightly altered taste (e.g. metallic), additional fluids may be required Sticky thickened saliva, may have slightly altered taste (e.g. metallic), additional fluids may be required Sticky thickened saliva, may have slightly altered taste (e.g. metallic), additional fluids may be required   Stomatitis/Pharyngitis None None None Painless ulcers, erythema, or mild soreness in the absence of lesions None None None   Taste Disturbance (dysgeusia) Markedly altered Markedly altered Slightly altered Slightly altered  Normal Normal   RT - Pain due to RT Mild pain not interfering with function None Mild pain not interfering with function Mild pain not interfering with function Mild pain not interfering with function None None   Cough Mild, relieved by non-prescription medication Absent Mild, relieved by non-prescription medication Absent Mild, relieved by non-prescription medication Absent Absent   Voice changes/stridor/larynx (hoarseness, loss of voice, laryngitis) Persistent hoarseness, but able to vocalize and/or may have mild to moderate edema Normal Mild or intermittent hoarseness Normal Mild or intermittent hoarseness Mild or intermittent hoarseness Normal         IMPRESSION:  Cancer Staging   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 on 3/28/2024  Prognostic indicators: BERTRAM <2mm      PLAN:  No change in treatment plan    Disposition:  Treatment plan reviewed. Questions answered. Continue therapy outlined.     Sarabjit CHENEY  MARY Antunez.    No orders of the defined types were placed in this encounter.

## 2024-05-22 ENCOUNTER — HOSPITAL ENCOUNTER (OUTPATIENT)
Dept: RADIATION ONCOLOGY | Facility: MEDICAL CENTER | Age: 69
End: 2024-05-22
Payer: MEDICARE

## 2024-05-22 LAB
CHEMOTHERAPY INFUSION START DATE: NORMAL
CHEMOTHERAPY INFUSION START DATE: NORMAL
CHEMOTHERAPY INFUSION STOP DATE: NORMAL
CHEMOTHERAPY RECORDS: 2
CHEMOTHERAPY RECORDS: 2
CHEMOTHERAPY RECORDS: 6600
CHEMOTHERAPY RECORDS: 6600
CHEMOTHERAPY RECORDS: NORMAL
CHEMOTHERAPY RX CANCER: NORMAL
CHEMOTHERAPY RX CANCER: NORMAL
DATE 1ST CHEMO CANCER: NORMAL
DATE 1ST CHEMO CANCER: NORMAL
RAD ONC ARIA COURSE LAST TREATMENT DATE: NORMAL
RAD ONC ARIA COURSE LAST TREATMENT DATE: NORMAL
RAD ONC ARIA COURSE TREATMENT ELAPSED DAYS: NORMAL
RAD ONC ARIA COURSE TREATMENT ELAPSED DAYS: NORMAL
RAD ONC ARIA REFERENCE POINT DOSAGE GIVEN TO DATE: 66
RAD ONC ARIA REFERENCE POINT DOSAGE GIVEN TO DATE: 66
RAD ONC ARIA REFERENCE POINT ID: NORMAL
RAD ONC ARIA REFERENCE POINT ID: NORMAL
RAD ONC ARIA REFERENCE POINT SESSION DOSAGE GIVEN: 2

## 2024-05-22 PROCEDURE — 77336 RADIATION PHYSICS CONSULT: CPT | Performed by: RADIOLOGY

## 2024-05-22 PROCEDURE — 77014 PR CT GUIDANCE PLACEMENT RAD THERAPY FIELDS: CPT | Mod: 26 | Performed by: RADIOLOGY

## 2024-05-22 PROCEDURE — 77427 RADIATION TX MANAGEMENT X5: CPT | Performed by: RADIOLOGY

## 2024-05-22 PROCEDURE — 77386 HCHG IMRT DELIVERY COMPLEX: CPT | Performed by: RADIOLOGY

## 2024-05-22 NOTE — PROGRESS NOTES
Patient continues to be followed by interdisciplinary Oncology Symptom Management Clinic team.  Patient's case discussed at weekly meeting on 5/22/24.    New interventions/additional follow up per this discussion include:      1.  Palliative f/v PRN      2.  Doing well overall - continues to golf      3.  6 week f/v is scheduled      The patient will continue to be followed by Symptom Management Clinic team.

## 2024-05-22 NOTE — RADIATION COMPLETION NOTES
END OF TREATMENT SUMMARY    Patient name:  Sarabjit Almendarez    Primary Physician:  Mundo Van M.D. MRN: [id number]  CSN: [id number]   Referring physician:  Rosa Conteh. : 1955, 68 y.o.       TREATMENT SUMMARY:        Course First Treatment Date 2024    Course Last Treatment Date 2024   Course Elapsed Days 44 @ [id number]   Course Intent Curative     Radiation Therapy Episodes       Active Episodes       Radiation Therapy: IMRT (2024)                   Radiation Treatments         Plan Last Treated On Elapsed Days Fractions Treated Prescribed Fraction Dose (cGy) Prescribed Total Dose (cGy)    L Oroph H,N M 2024 44 @ [id number] 33 of 33 200 6,600                  Reference Point Last Treated On Elapsed Days Most Recent Session Dose (cGy) Total Dose (cGy)    L Oroph H,N 2024 44 @ [id number] 200 6,600                                     STAGE:   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 on 3/28/2024  Histopathologic type: Squamous cell carcinoma, NOS  Stage prefix: Initial diagnosis  Residual tumor (R): R0 - None  ECOG performance status: Grade 0  Karnofsky performance status: Score 100  Prognostic indicators: BERTRAM <2mm       TREATMENT INDICATION:   ***     CONCURRENT SYSTEMIC TREATMENT:   ***     RT COURSE DISCONTINUED EARLY:   No     PATIENT EXPERIENCE:       2024     7:59 AM 2024     7:58 AM 2024     8:26 AM 2024     9:09 AM 2024     8:51 AM 2024     8:59 AM 4/10/2024     8:54 AM   Toxicity Assessment   Toxicity Assessment Head/Neck Head/Neck Head/Neck Head/Neck Head/Neck Head/Neck Head/Neck   Fatigue (lethargy, malaise, asthenia) Increased fatigue over baseline, but not altering normal activities Increased fatigue over baseline, but not altering normal activities  Increased fatigue over baseline, but not altering normal activities None Increased fatigue over baseline, but not altering normal activities None None   Radiation Dermatitis Moderate to brisk erythema or a patchy moist desquamation, mostly confined to skin folds and creases, with/without moderate edema Faint erythema or dry desquamation Faint erythema or dry desquamation None None None None   Rash/desquamation None Macular or papular eruption or erythema without associated symptoms None None None None None   Constipation None None None None None None None   Dehydration Dry mucous membranes and/or diminished skin turgor None None None None None None   Mouth Dryness Moderate Moderate Moderate Moderate Mild Mild Normal   RT Dysphagia-Pharyngeal Mild dysphagia, but can eat regular diet None None None None None None   Mucositis None None None Erythema of mucosa None None None   Salivary Gland Changes Thick, ropy, sticky saliva and/or markedly altered taste and alteration in diet required Sticky thickened saliva, may have slightly altered taste (e.g. metallic), additional fluids may be required Sticky thickened saliva, may have slightly altered taste (e.g. metallic), additional fluids may be required None Sticky thickened saliva, may have slightly altered taste (e.g. metallic), additional fluids may be required Sticky thickened saliva, may have slightly altered taste (e.g. metallic), additional fluids may be required Sticky thickened saliva, may have slightly altered taste (e.g. metallic), additional fluids may be required   Stomatitis/Pharyngitis None None None Painless ulcers, erythema, or mild soreness in the absence of lesions None None None   Taste Disturbance (dysgeusia) Markedly altered Markedly altered Slightly altered Slightly altered  Normal Normal   RT - Pain due to RT Mild pain not interfering with function None Mild pain not interfering with function Mild pain not interfering with function Mild pain not  "interfering with function None None   Cough Mild, relieved by non-prescription medication Absent Mild, relieved by non-prescription medication Absent Mild, relieved by non-prescription medication Absent Absent   Voice changes/stridor/larynx (hoarseness, loss of voice, laryngitis) Persistent hoarseness, but able to vocalize and/or may have mild to moderate edema Normal Mild or intermittent hoarseness Normal Mild or intermittent hoarseness Mild or intermittent hoarseness Normal        FOLLOW-UP PLAN:   {avpfollowuplist:89655::\"8 Weeks\"}     COMMENT:          ANATOMIC TARGET SUMMARY    ANATOMIC TARGET MODALITY TECHNIQUE   ***   {RAD ONC MODALITY:59958} {RAD ONC Technique:45517::\"IMRT\"}            COMMENT:         DIAGRAMS:      DOSE VOLUME HISTOGRAMS:              "

## 2024-05-27 ASSESSMENT — ENCOUNTER SYMPTOMS
WEIGHT LOSS: 1
CHILLS: 0
CONSTIPATION: 0
FEVER: 0
COUGH: 0
DIARRHEA: 0
PALPITATIONS: 0
SHORTNESS OF BREATH: 0
SORE THROAT: 1
VOMITING: 0
NAUSEA: 0

## 2024-05-27 NOTE — ASSESSMENT & PLAN NOTE
Patient with known head and neck cancer has now completed chemoradiation.  Did discuss anticipatory guidance of what to expect with recovery.  Does not have significant symptom needs at this time.  Has not needed opioid medication for pain.  Has been working to ensure proper hydration and nutrition throughout his treatment.  Has had the expected symptoms including current sore throat, dry mouth, fatigue, and loss of appetite.  Do expect patient to begin recovery and do not anticipate any significant complications.  Will follow-up as needed with palliative care.

## 2024-05-27 NOTE — PROGRESS NOTES
Subjective:     Chief Complaint   Patient presents with    Cancer      [facility]/ [facility]/ 2 wk FV     Sarabjit Almendarez is a 68 y.o. male here today for for follow-up on symptom management for oncological pain.  Patient is coming to the end of his chemoradiation.  Reports continued sore throat, dry mouth, fatigue, and loss of appetite.  He has not required the use of opioids for pain.  He is working to keep up his intake to ensure proper nutrition and hydration.  He is looking forward to recovery as he has plans related to his golfing this summer.  We did discuss anticipatory guidance on what to expect with recovery and that should begin approximately 7 to 10 days.  Does not endorse any significant nausea or vomiting or constipation or diarrhea.  Will plan to follow-up as needed.    Problem   Squamous Cell Carcinoma of Base of Tongue (Hcc)    Formatting of this note might be different from the original.   68 Year old male with cT1 cN1 cM0 p16+ squamous cell carcinoma of the left base of tongue.    S/F TRANSORAL NON-TONSIL SURGERY; ROBOTIC ASSISTED WITH/ WITHOUT LARYNGOSCOPY (Left)   NECK DISSECTION MODIFIED RADICAL (Left)   ROBOTIC [employer] SACV AURELIO SP SINGLE CONSOLE; SECONDARY PROCEDURE   DENTAL EXTRACTION(S) on 02/27/2024          Current medicines (including changes today)  Current Outpatient Medications   Medication Sig Dispense Refill    Doxepin HCl 6 MG Tab Take 6 mg by mouth at bedtime as needed (Insomnia). 30 Tablet 2    Melatonin 10 MG Tab Take 10 mg by mouth as needed.      ondansetron (ZOFRAN) 4 MG Tab tablet Take 1 Tablet by mouth every four hours as needed for Nausea/Vomiting (for nausea, vomiting). 30 Tablet 6    prochlorperazine (COMPAZINE) 10 MG Tab Take 1 Tablet by mouth every 6 hours as needed (for nausea, vomiting). 30 Tablet 6    lidocaine (XYLOCAINE) 2 % Solution OK to mix with 1:1 Mylanta/Maalox - use 5mL solution every 1 hour PRN for oral discomfort 100 mL 1    ezetimibe (ZETIA) 10 MG Tab Take 1  "Tablet by mouth at bedtime. 100 Tablet 3    metoprolol SR (TOPROL XL) 25 MG TABLET SR 24 HR Take 1 Tablet by mouth every day. 90 Tablet 3    rosuvastatin (CRESTOR) 40 MG tablet Take 1 Tablet by mouth at bedtime. 90 Tablet 3    aspirin EC (ECOTRIN) 81 MG TBEC Take 81 mg by mouth every day.      ramelteon (ROZEREM) 8 MG tablet Take 1 Tablet by mouth every evening. (Patient not taking: Reported on 5/7/2024) 30 Tablet 6     No current facility-administered medications for this encounter.       Social History     Tobacco Use    Smoking status: Never    Smokeless tobacco: Never   Vaping Use    Vaping status: Never Used   Substance Use Topics    Alcohol use: No    Drug use: Never     Past Medical History:   Diagnosis Date    Arrhythmia     CAD (coronary artery disease)     CABG     Hyperlipidemia     Palpitations     PVC's      Family History   Problem Relation Age of Onset    Heart Disease Other          Objective:     Vitals:    05/21/24 0837   BP: 104/64   Pulse: 75   Temp: 36.8 °C (98.3 °F)   TempSrc: Temporal   SpO2: 93%   Weight: 75.7 kg (166 lb 12.8 oz)   Height: 1.676 m (5' 6\")     Body mass index is 26.92 kg/m².     Review of Systems   Constitutional:  Positive for malaise/fatigue and weight loss. Negative for chills and fever.   HENT:  Positive for sore throat.    Respiratory:  Negative for cough and shortness of breath.    Cardiovascular:  Negative for chest pain and palpitations.   Gastrointestinal:  Negative for constipation, diarrhea, nausea and vomiting.     Physical Exam:   Gen: no acute distress.   Skin: Pink, warm, and dry  HEENT: conjunctiva non-injected, sclera non-icteric. EOMs intact.   Nasal mucosa without edema nor erythema.   Pinna normal.    Oral mucous membranes dry  Neck: Supple, trachea midline.   Lungs: Effort is normal.   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  Did discuss anticipatory guidance of what to expect with recovery.  Does not have significant symptom needs at this time.  Has not needed opioid medication for pain.  Has been working to ensure proper hydration and nutrition throughout his treatment.  Has had the expected symptoms including current sore throat, dry mouth, fatigue, and loss of appetite.  Do expect patient to begin recovery and do not anticipate any significant complications.  Will follow-up as needed with palliative care.    26 minutes in total spent on patient encounter including chart review and consultation with patient oncology providers.    Followup: Return if symptoms worsen or fail to improve.    Don Hinkle M.D.

## 2024-05-28 ENCOUNTER — TELEPHONE (OUTPATIENT)
Dept: RADIATION ONCOLOGY | Facility: MEDICAL CENTER | Age: 69
End: 2024-05-28
Payer: MEDICARE

## 2024-05-28 NOTE — TELEPHONE ENCOUNTER
Nutrition Services: Telephone Encounter     Wt Readings from Last 6 Encounters:   05/21/24 75.7 kg (166 lb 12.8 oz)   05/21/24 74.5 kg (164 lb 4.8 oz)   05/14/24 78.5 kg (173 lb)   05/13/24 78.1 kg (172 lb 4.6 oz)   05/13/24 77.9 kg (171 lb 11.8 oz)   05/08/24 80.5 kg (177 lb 6.4 oz)     RD called for nutrition check-in following treatment cessation. Pt reports a current body weight of 162.5 lbs which is a -2.11% wt loss from our last recorded wt. Pt reports wt was lower but is now trending up. Pt reports eating all day long. Diet recall below. Pt consuming multiple protein supplements/d. Includes high kcal add-ins. Pt with a wt goal of 170 lbs. Feels he is slowly getting back. RD encouraged current regimen with multiple protein supplements.     Ensure, Apple sauce, banana   Noosa yogurt  Ensure  Fruit smoothie with olive oil, yogurt and whey protein  Chicken & rice/potatoes/soup  Milkshake    Pt verbalizes understanding and is receptive to information provided.   Please contact -[phone]

## 2024-05-29 NOTE — PROGRESS NOTES
Patient continues to be followed by interdisciplinary Oncology Symptom Management Clinic team.  Patient's case discussed at weekly meeting on 5/29/24.    RD has signed off  No new interventions needed at this time      The patient is discharged from weekly case review per Symptom Management Clinic team.

## 2024-06-04 ENCOUNTER — APPOINTMENT (OUTPATIENT)
Dept: RADIOLOGY | Facility: MEDICAL CENTER | Age: 69
End: 2024-06-04
Attending: STUDENT IN AN ORGANIZED HEALTH CARE EDUCATION/TRAINING PROGRAM
Payer: MEDICARE

## 2024-06-04 DIAGNOSIS — C01 SQUAMOUS CELL CARCINOMA OF BASE OF TONGUE (HCC): ICD-10-CM

## 2024-06-04 PROCEDURE — 76705 ECHO EXAM OF ABDOMEN: CPT

## 2024-06-07 ENCOUNTER — HOSPITAL ENCOUNTER (OUTPATIENT)
Dept: HEMATOLOGY ONCOLOGY | Facility: MEDICAL CENTER | Age: 69
End: 2024-06-07
Attending: STUDENT IN AN ORGANIZED HEALTH CARE EDUCATION/TRAINING PROGRAM
Payer: MEDICARE

## 2024-06-07 DIAGNOSIS — Z79.899 ENCOUNTER FOR LONG-TERM (CURRENT) USE OF HIGH-RISK MEDICATION: ICD-10-CM

## 2024-06-07 PROCEDURE — 99214 OFFICE O/P EST MOD 30 MIN: CPT | Mod: 95 | Performed by: STUDENT IN AN ORGANIZED HEALTH CARE EDUCATION/TRAINING PROGRAM

## 2024-06-07 ASSESSMENT — ENCOUNTER SYMPTOMS
SENSORY CHANGE: 0
WHEEZING: 0
SPUTUM PRODUCTION: 0
TINGLING: 0
ABDOMINAL PAIN: 0
FOCAL WEAKNESS: 0
ORTHOPNEA: 0
VOMITING: 0
NECK PAIN: 0
FEVER: 0
COUGH: 0
MEMORY LOSS: 0
SHORTNESS OF BREATH: 0
DEPRESSION: 0
HEADACHES: 0
PALPITATIONS: 0
HEARTBURN: 0
SORE THROAT: 0
CHILLS: 0
BRUISES/BLEEDS EASILY: 0
TREMORS: 0
BLURRED VISION: 0
WEIGHT LOSS: 1
NAUSEA: 0
DIZZINESS: 0

## 2024-06-07 NOTE — PROGRESS NOTES
Consult Note: Hematology/Oncology     Referring Provider: Dr. Heron SUAREZ  Primary Care:  Mundo Van M.D.    Chief Complaint   Patient presents with    Cancer     Follow up after scans       Current Treatment:     02/27/24: Excision of his left glossopharyngeal sulcus muscle and left neck dissection along with dental extraction of tooth 24   04/08/24: C1 Cisplatin  04/15/24: C2 Cisplatin   04/22/24: C3 Cisplatin  04/29/24: C4 Cisplatin  05/06/24: C5 Cisplatin  05/13/24: C6 Cisplatin    This evaluation was conducted via Zoom using secure and encrypted videoconferencing technology. The patient was in their home in the [facility].    The patient's identity was confirmed and verbal consent was obtained for this virtual visit.      Subjective:   History of Presenting Illness:  Sarabjit Almendarez is a 68 y.o. 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    Patient reports that he was golfing and told a friend about pain on the left side of neck.  Friend is a physician who referred him to Dr. Silverio.  He had a CT of the soft tissue neck on December 19, 2023 which demonstrated left neck level 2 lymph nodes measuring 1.8 x 1.7 cm.  He saw Dr. Silverio who conducted a biopsy of the left cervical lymph node which demonstrated epithelial elements most consistent with squamous cell carcinoma p16 positive.  He was referred to [place] and saw Dr. Maldonado on January 29, 2024.  He had a PET scan done on February 8, 2024 which demonstrated a mass at the left base of the tongue with 2 metastatic left cervical lymph nodes.    On February 27, 2024 he underwent a an excision of his left glossopharyngeal sulcus muscle and left neck dissection along with dental extraction of tooth 24.  Pathology demonstrated squamous cell carcinoma HPV associated with focal intra tumor all perineural invasion and monica metastases.  There was tumor present at the deep tissue edge 1.5 mm from the posterior  mucosal edge.  Additional deep margins of the left constrictor muscle and left thigh localized cysts positive for carcinoma.  1 known level 2 level node positive for metastatic squamous cell    Is currently just over 1 month post surgery.  He reports doing very well and had some dysphagia after surgery and pain but is overall doing well.  He is nervous for the treatment that is ahead of him.    Presents with Altagracia his wife    Interval History    Sarabjit has completed chemoRT.    2 main issues: Mouth is dry and eating (not tasting well).    Pt reports that his weight is holding steady.  His activity is almost back to normal. He plays golf almost every day.     His nausea has resolved..      No PN.       Past Medical History:   Diagnosis Date    Arrhythmia     CAD (coronary artery disease)     CABG     Hyperlipidemia     Palpitations     PVC's        Past Surgical History:   Procedure Laterality Date    MULTIPLE CORONARY ARTERY BYPASS      TONSILLECTOMY Bilateral     ZZZ CARDIAC CATH         Social History     Tobacco Use    Smoking status: Never    Smokeless tobacco: Never   Vaping Use    Vaping status: Never Used   Substance Use Topics    Alcohol use: No    Drug use: Never        Family History   Problem Relation Age of Onset    Heart Disease Other        No Known Allergies    Current Outpatient Medications   Medication Sig Dispense Refill    Melatonin 10 MG Tab Take 10 mg by mouth as needed.      ondansetron (ZOFRAN) 4 MG Tab tablet Take 1 Tablet by mouth every four hours as needed for Nausea/Vomiting (for nausea, vomiting). 30 Tablet 6    prochlorperazine (COMPAZINE) 10 MG Tab Take 1 Tablet by mouth every 6 hours as needed (for nausea, vomiting). 30 Tablet 6    lidocaine (XYLOCAINE) 2 % Solution OK to mix with 1:1 Mylanta/Maalox - use 5mL solution every 1 hour PRN for oral discomfort 100 mL 1    ezetimibe (ZETIA) 10 MG Tab Take 1 Tablet by mouth at bedtime. 100 Tablet 3    metoprolol SR (TOPROL XL) 25 MG TABLET SR  24 HR Take 1 Tablet by mouth every day. 90 Tablet 3    rosuvastatin (CRESTOR) 40 MG tablet Take 1 Tablet by mouth at bedtime. 90 Tablet 3    aspirin EC (ECOTRIN) 81 MG TBEC Take 81 mg by mouth every day.      Doxepin HCl 6 MG Tab Take 6 mg by mouth at bedtime as needed (Insomnia). 30 Tablet 2    ramelteon (ROZEREM) 8 MG tablet Take 1 Tablet by mouth every evening. (Patient not taking: Reported on 5/7/2024) 30 Tablet 6     No current facility-administered medications for this encounter.       Review of Systems   Constitutional:  Positive for malaise/fatigue and weight loss. Negative for chills and fever.   HENT:  Negative for congestion, ear pain, nosebleeds and sore throat.         Voice changes   Eyes:  Negative for blurred vision.   Respiratory:  Negative for cough, sputum production, shortness of breath and wheezing.    Cardiovascular:  Negative for chest pain, palpitations, orthopnea and leg swelling.   Gastrointestinal:  Negative for abdominal pain, heartburn, nausea and vomiting.   Genitourinary:  Negative for dysuria, frequency and urgency.   Musculoskeletal:  Negative for neck pain.   Neurological:  Negative for dizziness, tingling, tremors, sensory change, focal weakness and headaches.   Endo/Heme/Allergies:  Does not bruise/bleed easily.   Psychiatric/Behavioral:  Negative for depression, memory loss and suicidal ideas.    All other systems reviewed and are negative.      Problem list, medications, and allergies reviewed by myself today in Epic.     Objective:     There were no vitals filed for this visit.      DESC; KARNOFSKY SCALE WITH ECOG EQUIVALENT: 90, Able to carry on normal activity; minor signs or symptoms of disease (ECOG equivalent 0)    DISTRESS LEVEL: no apparent distress    Physical Exam  Deferred    Labs:   Most recent labs reviewed.  Please see the lab tab of chart review    Imaging:   Most recent images below have been independently reviewed by me.  Please see the imaging tab of chart  review    Pathology:  2/27/24  A. 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 on 3/28/2024       Mr. Almendarez is a 67 yo M presents with stage I HPV mediated oropharyngeal carcinoma.    He is s/p chemoRT.     Plan  -will need PET scan 3 months after treatment  -will need to see Med Onc after this  -if RAPHAEL, no need to f/u with med onc - can f/u with ENT and Rad Onc      2.  Insomnia  ->30 min onset to fall asleep  -continue melatonin   -did not  doxepin    3.  Abdominal Pain  -Specifically epigastric pain  -Will get an ultrasound  -Virtual visit with me afterwards        No follow-ups on file.     Any questions and concerns raised by the patient were addressed and answered. Patient denies any further questions.  Patient encouraged to call the office with any concerns or issues.     Josefina Geronimo M.D.  Hematology/Oncology

## 2024-06-25 ENCOUNTER — APPOINTMENT (OUTPATIENT)
Dept: HEMATOLOGY ONCOLOGY | Facility: MEDICAL CENTER | Age: 69
End: 2024-06-25
Payer: MEDICARE

## 2024-07-08 ASSESSMENT — LIFESTYLE VARIABLES
SMOKING_STATUS: NO
TOBACCO_USE: NO

## 2024-07-11 ENCOUNTER — HOSPITAL ENCOUNTER (OUTPATIENT)
Dept: RADIATION ONCOLOGY | Facility: MEDICAL CENTER | Age: 69
End: 2024-07-11
Attending: RADIOLOGY
Payer: MEDICARE

## 2024-07-11 ENCOUNTER — HOSPITAL ENCOUNTER (OUTPATIENT)
Dept: HEMATOLOGY ONCOLOGY | Facility: MEDICAL CENTER | Age: 69
End: 2024-07-11
Attending: STUDENT IN AN ORGANIZED HEALTH CARE EDUCATION/TRAINING PROGRAM
Payer: MEDICARE

## 2024-07-11 VITALS
SYSTOLIC BLOOD PRESSURE: 114 MMHG | TEMPERATURE: 99.3 F | OXYGEN SATURATION: 97 % | HEIGHT: 66 IN | DIASTOLIC BLOOD PRESSURE: 64 MMHG | BODY MASS INDEX: 27 KG/M2 | WEIGHT: 167.99 LBS | HEART RATE: 71 BPM

## 2024-07-11 VITALS
TEMPERATURE: 98.5 F | SYSTOLIC BLOOD PRESSURE: 130 MMHG | OXYGEN SATURATION: 94 % | BODY MASS INDEX: 26.63 KG/M2 | HEART RATE: 72 BPM | DIASTOLIC BLOOD PRESSURE: 79 MMHG | WEIGHT: 165 LBS

## 2024-07-11 DIAGNOSIS — Z79.899 ENCOUNTER FOR LONG-TERM (CURRENT) USE OF HIGH-RISK MEDICATION: ICD-10-CM

## 2024-07-11 DIAGNOSIS — C01 SQUAMOUS CELL CARCINOMA OF BASE OF TONGUE (HCC): ICD-10-CM

## 2024-07-11 PROCEDURE — 99212 OFFICE O/P EST SF 10 MIN: CPT | Mod: 25 | Performed by: RADIOLOGY

## 2024-07-11 PROCEDURE — 99212 OFFICE O/P EST SF 10 MIN: CPT | Performed by: STUDENT IN AN ORGANIZED HEALTH CARE EDUCATION/TRAINING PROGRAM

## 2024-07-11 PROCEDURE — 31575 DIAGNOSTIC LARYNGOSCOPY: CPT | Performed by: RADIOLOGY

## 2024-07-11 PROCEDURE — 99214 OFFICE O/P EST MOD 30 MIN: CPT | Performed by: STUDENT IN AN ORGANIZED HEALTH CARE EDUCATION/TRAINING PROGRAM

## 2024-07-11 RX ORDER — PILOCARPINE HYDROCHLORIDE 5 MG/1
5 TABLET, FILM COATED ORAL 3 TIMES DAILY
Qty: 90 TABLET | Refills: 5 | Status: SHIPPED | OUTPATIENT
Start: 2024-07-11

## 2024-07-11 ASSESSMENT — ENCOUNTER SYMPTOMS
TREMORS: 0
BRUISES/BLEEDS EASILY: 0
BLURRED VISION: 0
SHORTNESS OF BREATH: 0
HEARTBURN: 0
DEPRESSION: 0
WHEEZING: 0
PALPITATIONS: 0
FOCAL WEAKNESS: 0
HEADACHES: 0
MEMORY LOSS: 0
VOMITING: 0
FEVER: 0
ABDOMINAL PAIN: 0
TINGLING: 0
DIZZINESS: 0
COUGH: 0
NECK PAIN: 0
WEIGHT LOSS: 0
SENSORY CHANGE: 0
ORTHOPNEA: 0
SPUTUM PRODUCTION: 0
NAUSEA: 0
CHILLS: 0
SORE THROAT: 0

## 2024-07-11 ASSESSMENT — FIBROSIS 4 INDEX
FIB4 SCORE: 2.35
FIB4 SCORE: 2.35

## 2024-07-11 ASSESSMENT — PAIN SCALES - GENERAL: PAINLEVEL: NO PAIN

## 2024-08-26 ENCOUNTER — APPOINTMENT (OUTPATIENT)
Dept: HEMATOLOGY ONCOLOGY | Facility: MEDICAL CENTER | Age: 69
End: 2024-08-26
Payer: MEDICARE

## 2024-08-26 ENCOUNTER — TELEPHONE (OUTPATIENT)
Dept: HEMATOLOGY ONCOLOGY | Facility: MEDICAL CENTER | Age: 69
End: 2024-08-26

## 2024-09-10 ASSESSMENT — LIFESTYLE VARIABLES
TOBACCO_USE: NO
SMOKING_STATUS: NO

## 2024-09-12 ENCOUNTER — HOSPITAL ENCOUNTER (OUTPATIENT)
Dept: RADIOLOGY | Facility: MEDICAL CENTER | Age: 69
End: 2024-09-12
Attending: RADIOLOGY
Payer: MEDICARE

## 2024-09-12 DIAGNOSIS — C01 SQUAMOUS CELL CARCINOMA OF BASE OF TONGUE (HCC): ICD-10-CM

## 2024-09-12 LAB — GLUCOSE BLD-MCNC: 79 MG/DL (ref 65–99)

## 2024-09-12 PROCEDURE — A9552 F18 FDG: HCPCS

## 2024-09-13 ENCOUNTER — HOSPITAL ENCOUNTER (OUTPATIENT)
Dept: RADIATION ONCOLOGY | Facility: MEDICAL CENTER | Age: 69
End: 2024-09-13
Attending: RADIOLOGY
Payer: MEDICARE

## 2024-09-13 VITALS
TEMPERATURE: 98.7 F | HEART RATE: 70 BPM | WEIGHT: 166.01 LBS | SYSTOLIC BLOOD PRESSURE: 123 MMHG | DIASTOLIC BLOOD PRESSURE: 78 MMHG | BODY MASS INDEX: 26.81 KG/M2 | OXYGEN SATURATION: 94 %

## 2024-09-13 PROCEDURE — 99212 OFFICE O/P EST SF 10 MIN: CPT | Mod: 25 | Performed by: RADIOLOGY

## 2024-09-13 PROCEDURE — 99213 OFFICE O/P EST LOW 20 MIN: CPT | Mod: 25 | Performed by: RADIOLOGY

## 2024-09-13 PROCEDURE — 31575 DIAGNOSTIC LARYNGOSCOPY: CPT | Performed by: RADIOLOGY

## 2024-09-13 ASSESSMENT — FIBROSIS 4 INDEX: FIB4 SCORE: 2.35

## 2024-09-13 ASSESSMENT — PAIN SCALES - GENERAL: PAINLEVEL: NO PAIN

## 2024-09-13 NOTE — PROCEDURES
DATE OF SERVICE: 9/13/2024         FIBEROPTIC NASO-PHARYNGOSCOPY NOTE      Flexible fiberoptic exam was performed after anesthesia of left nostril and oropharynx.    Nasopharynx:       R. Eustachian canal opening, torus, fossa of Rosenmuller appear normal  L. Eustachian canal opening, torus, fossa of Rosenmuller appear normal      Oropharynx:        Base of tongue normal appearing.  Vallecula normal appearing.  Epiglottis normal appearing.  PE folds normal appearing.    Larynx:      R. AE fold, false vocal fold, arytenoid appear normal  L. AE fold, false vocal fold, arytenoid appear normal  R. Cord mobile  L. Cord mobile   R. Piriform sinus appears normal  L. Piriform sinus appears normal      Michael JOHNSON M.D.  Electronically signed by: Michael JOHNSON M.D., 9/13/2024 9:32 AM  [phone]

## 2024-09-13 NOTE — PROGRESS NOTES
RADIATION ONCOLOGY FOLLOW-UP    Patient name:  Sarabjit Almendarez    Primary Physician:  Mundo Van M.D. MRN: [id number]  [facility]: [id number]   Care Team:  Patient Care Team:  Mundo Van M.D. as PCP - General ([facility])  Michael JOHNSON M.D. as Radiation Oncologist (Radiation Oncology)  Don Hinkle M.D. (Family Medicine)  JAKOB Danielson (Medical Oncology)  : 1955, 68 y.o.     DATE OF SERVICE:   2024    IDENTIFICATION:   A 68 y.o. 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 on 3/28/2024  Histopathologic type: Squamous cell carcinoma, NOS  Stage prefix: Initial diagnosis  Residual tumor (R): R0 - None  ECOG performance status: Grade 0  Karnofsky performance status: Score 100  Prognostic indicators: BERTRAM <2mm      RADIATION SUMMARY:  Radiation Oncology          2024   Aria Course Treatment Dates   Course First Treatment Date 2024    Course Last Treatment Date 2024    Aria Treatment Summary   L Oroph H,N M  Plan from Course [id number]   Fraction 32 of 33 33 of 33    33 of 33   Elapsed Course Days 43 @  44 @     44 @    Prescribed Fraction Dose 200 cGy 200 cGy    200 cGy   Prescribed Total Dose 6,600 cGy 6,600 cGy    6,600 cGy   L Oroph H,N  Reference Point from Course [id number]   Elapsed Course Days 43 @  44 @     44 @    Session Dose 200 cGy 200 cGy    --   Total Dose 6,400 cGy 6,600 cGy    6,600 cGy      Details          More values are hidden. Newest values shown. Go to activity for more data.                HISTORY OF PRESENT ILLNESS:   Subjective    68-year-old male,  at [facility] [employer].  Non-smoker nondrinker.  Presented with palpable left neck mass October  2023.  Denies any dysphagia, odynophagia or otalgia.     CT soft tissue neck 12/19/2023 demonstrated left neck level 2 lymph node measuring 1.8 x 1.7 cm.     1/2/2024 saw Dr. Silverio core biopsy left cervical lymph node performed demonstrating epithelial elements most consistent with squamous cell carcinoma p16 positive.     Seen by Dr. Maldonado at [place] in consult 1/29/2024.  On exam noted to have a 2 cm lesion involving the left glossopharyngeal sulcus along with the left level 2 neck node.     2/8/2024 PET/CT scan demonstrated mass left base of tongue with 2 metastatic left cervical lymph nodes.     2/27/2024 underwent tors excision of left glossopharyngeal sulcus mass and left neck dissection along with dental extraction of tooth #26.  Pathology demonstrated squamous cell carcinoma HPV associated with focal intratumoral perineural invasion and monica metastasis.  Tumor present at deep tissue edge 1.5 mm from the posterior mucosal edge.  Additional deep margin left constrictor muscle and left styloglossus positive for carcinoma.  Additional deep margin focal detached tumor.  1 level 2 lymph node positive for metastatic squamous cell carcinoma with extranodal extension present less than 2 mm.  28 additional lymph nodes level 2B3 and 4 negative for metastatic disease.     He is 4 and half weeks postop.  States having some dysphagia post surgery which is improving.  Reports some numbness in his left mandible submandibular region postoperatively.    7/11/2024     Scheduled follow-up visit.  Overall doing well, initial visit post completion of radiotherapy.  Does have some dry mouth but taste improving.  Dry mouth is contributing to mild dysphagia requires considerable amount of water to wash down his food.  Getting regular dental checkups.  No sore throat.     Functional Assessment of Cancer Therapy?Head and Neck Radiotherapy measure (FACT-HN_RAD) 0-4  Statement Not at all A little bit Somewhat Quite a bit Very  "much   My voice has its usual quality and strength []  []  []  [x]  []    I can enjoy the taste of food []  []  [x]  []  []    I can swallow naturally and easily []  [x]  []  []  []    I have pain in my mouth, throat, or neck [x]  []  []  []  []    My mouth is dry []  []  []  [x]  []    I am bothered by skin problems [x]  []  []  []  []    I am worried about my weight []  [x]  []  []  []    My fatigue keeps me from doing the things I want to do [x]  []  []  []  []    \"Below is a list of statements that other people with your illness said are important.  Please [place] or nino one number per line to indicate your response as it applies to the past 7 days\"  ROMAN Otolaryngol Head Neck Surg. doi:[email or web address]        INTERVAL HISTORY:  9/13/2024     Scheduled follow-up visit after restaging PET CT scan.  Overall doing well.  Primary complaint is dry mouth.  He also has some mild lymphedema in his neck.  Very active playing golf regularly.  PET CT scan shows no evidence of residual disease or new disease.  Functional Assessment of Cancer Therapy?Head and Neck Radiotherapy measure (FACT-HN_RAD) 0-4  Statement Not at all A little bit Somewhat Quite a bit Very much   My voice has its usual quality and strength [] [] [] [x] []   I can enjoy the taste of food [] [] [] [x] []   I can swallow naturally and easily [] [] [x] [] []   I have pain in my mouth, throat, or neck [x] [] [] [] []   My mouth is dry [] [] [] [] [x]   I am bothered by skin problems [x] [] [] [] []   I am worried about my weight [] [] [] [x] []   My fatigue keeps me from doing the things I want to do [x] [] [] [] []   \"Below is a list of statements that other people with your illness said are important.  Please [place] or nino one number per line to indicate your response as it applies to the past 7 days\"  ROMAN Otolaryngol Head Neck Surg. doi:[email or web address]    CURRENT MEDICATIONS:  Current Outpatient Medications   Medication Sig Dispense " Refill    pilocarpine (SALAGEN) 5 MG Tab Take 1 Tablet by mouth 3 times a day. 90 Tablet 5    Doxepin HCl 6 MG Tab Take 6 mg by mouth at bedtime as needed (Insomnia). 30 Tablet 2    Melatonin 10 MG Tab Take 10 mg by mouth as needed. (Patient not taking: Reported on 7/11/2024)      ramelteon (ROZEREM) 8 MG tablet Take 1 Tablet by mouth every evening. (Patient not taking: Reported on 5/7/2024) 30 Tablet 6    ondansetron (ZOFRAN) 4 MG Tab tablet Take 1 Tablet by mouth every four hours as needed for Nausea/Vomiting (for nausea, vomiting). (Patient not taking: Reported on 7/11/2024) 30 Tablet 6    prochlorperazine (COMPAZINE) 10 MG Tab Take 1 Tablet by mouth every 6 hours as needed (for nausea, vomiting). (Patient not taking: Reported on 7/11/2024) 30 Tablet 6    lidocaine (XYLOCAINE) 2 % Solution OK to mix with 1:1 Mylanta/Maalox - use 5mL solution every 1 hour PRN for oral discomfort (Patient not taking: Reported on 7/11/2024) 100 mL 1    ezetimibe (ZETIA) 10 MG Tab Take 1 Tablet by mouth at bedtime. 100 Tablet 3    metoprolol SR (TOPROL XL) 25 MG TABLET SR 24 HR Take 1 Tablet by mouth every day. 90 Tablet 3    rosuvastatin (CRESTOR) 40 MG tablet Take 1 Tablet by mouth at bedtime. 90 Tablet 3    aspirin EC (ECOTRIN) 81 MG TBEC Take 81 mg by mouth every day.       No current facility-administered medications for this encounter.       ALLERGIES:  Patient has no known allergies.    REVIEW OF SYSTEMS:    A complete review of system taken. Pertinent items in HPI.  All other negative.    PHYSICAL EXAM:   PERFORMANCE STATUS: 100       No data to display                  9/13/2024     9:00 AM 7/11/2024     8:51 AM 7/11/2024     7:57 AM 5/21/2024     8:37 AM 5/21/2024     7:57 AM 5/14/2024     7:56 AM 5/13/2024     8:54 AM   Vitals   SYSTOLIC 123 114 130 104 131 110 113   DIASTOLIC 78 64 79 64 75 65 68   Pulse 70 71 72 75 77 79 75   Temperature 37.1 °C (98.7 °F) 37.4 °C (99.3 °F) 36.9 °C (98.5 °F) 36.8 °C (98.3 °F) 36.4 °C  "(97.6 °F) 37.1 °C (98.7 °F) 37.1 °C (98.7 °F)   Respiration     16  18   Weight 166.01 167.99 165 166.8 164.3 173 171.74   Height  1.676 m (5' 5.98\")  1.676 m (5' 6\")   1.7 m (5' 6.93\")   BMI 26.81 kg/m2 27.13 kg/m2 26.63 kg/m2 26.92 kg/m2 25.79 kg/m2 27.15 kg/m2 26.96 kg/m2   Pulse Oximetry 94 % 97 % 94 % 93 % 95 % 94 % 94 %       Physical Exam  Vitals and nursing note reviewed.   Constitutional:       General: He is not in acute distress.     Appearance: He is well-developed.   HENT:      Head: Normocephalic.      Mouth/Throat:      Mouth: Mucous membranes are moist.      Pharynx: Oropharynx is clear. No oropharyngeal exudate or posterior oropharyngeal erythema.   Eyes:      Conjunctiva/sclera: Conjunctivae normal.      Pupils: Pupils are equal, round, and reactive to light.   Neck:      Comments: Mild lymphedema anterior neck  Cardiovascular:      Rate and Rhythm: Normal rate and regular rhythm.   Pulmonary:      Effort: Pulmonary effort is normal.   Musculoskeletal:         General: No deformity. Normal range of motion.      Cervical back: Normal range of motion and neck supple.   Lymphadenopathy:      Cervical: No cervical adenopathy.   Skin:     General: Skin is warm and dry.      Findings: No erythema.   Neurological:      Mental Status: He is alert and oriented to person, place, and time.      Cranial Nerves: No cranial nerve deficit.      Sensory: No sensory deficit.      Motor: No abnormal muscle tone.      Coordination: Coordination normal.      Deep Tendon Reflexes: Reflexes normal.   Psychiatric:         Behavior: Behavior normal.         Thought Content: Thought content normal.         Judgment: Judgment normal.           FIBEROPTIC EXAM:  Normal exam    LABORATORY DATA:   Signatera result pending    Lab Results   Component Value Date/Time    WBC 5.3 05/13/2024 09:05 AM    RBC 4.05 (L) 05/13/2024 09:05 AM    HEMOGLOBIN 11.7 (L) 05/13/2024 09:05 AM    HEMATOCRIT 33.9 (L) 05/13/2024 09:05 AM    MCV 83.7 " 05/13/2024 09:05 AM    MCH 28.9 05/13/2024 09:05 AM    MCHC 34.5 05/13/2024 09:05 AM    RDW 40.1 05/13/2024 09:05 AM    PLATELETCT 141 (L) 05/13/2024 09:05 AM    MPV 9.8 05/13/2024 09:05 AM    NEUTSPOLYS 80.60 (H) 05/13/2024 09:05 AM    LYMPHOCYTES 7.50 (L) 05/13/2024 09:05 AM    MONOCYTES 10.20 05/13/2024 09:05 AM    EOSINOPHILS 1.10 05/13/2024 09:05 AM    BASOPHILS 0.20 05/13/2024 09:05 AM      Lab Results   Component Value Date/Time    SODIUM 137 05/13/2024 09:05 AM    POTASSIUM 4.7 05/13/2024 09:05 AM    CHLORIDE 103 05/13/2024 09:05 AM    CO2 22 05/13/2024 09:05 AM    GLUCOSE 100 (H) 05/13/2024 09:05 AM    BUN 29 (H) 05/13/2024 09:05 AM    CREATININE 0.77 05/13/2024 09:05 AM    CREATININE 1.0 08/07/2006 10:20 AM    BUNCREATRAT 19 12/01/2023 10:40 AM    BUNCREATRAT 16 01/21/2015 08:52 AM         RADIOLOGY DATA:  [id number] BASE TO MID-THIGH    Result Date: 9/12/2024  1. Minimal diffuse uptake at the left base of the tongue, likely posttherapy changes. No residual disease (SUV max 2.8, Ahn score 2) 2. No increased uptake in the bilateral level 2 lymph nodes      IMPRESSION:    A 68 y.o. 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 on 3/28/2024  Histopathologic type: Squamous cell carcinoma, NOS  Stage prefix: Initial diagnosis  Residual tumor (R): R0 - None  ECOG performance status: Grade 0  Karnofsky performance status: Score 100  Prognostic indicators: BERTRAM <2mm      CANCER STATUS:  No Evidence of Disease    RECOMMENDATIONS:   Reviewed exam findings with him.  Reassured him.  He will see Dr. Silverio in October will return here for follow-up in November.  He is also visiting with Dr. Maldonado at [facility] with a MRI scan to be performed at [facility].    Thank you for the opportunity to participate in his care.  If any questions or  comments, please do not hesitate in calling.    No orders of the defined types were placed in this encounter.

## 2024-09-13 NOTE — PROGRESS NOTES
Patient was seen today in clinic with Dr. Shelby for follow up.  Vitals signs and weight were obtained and pain assessment was completed.  Allergies and medications were reviewed with the patient.  Toxicities of treatment assessed.     Vitals/Pain:  Vitals:    09/13/24 0900   BP: 123/78   BP Location: Right arm   Patient Position: Sitting   BP Cuff Size: Adult   Pulse: 70   Temp: 37.1 °C (98.7 °F)   TempSrc: Temporal   SpO2: 94%   Weight: 75.3 kg (166 lb 0.1 oz)   Pain Score: No pain        Allergies:   Patient has no known allergies.    Current Medications:  Current Outpatient Medications   Medication Sig Dispense Refill    pilocarpine (SALAGEN) 5 MG Tab Take 1 Tablet by mouth 3 times a day. 90 Tablet 5    Doxepin HCl 6 MG Tab Take 6 mg by mouth at bedtime as needed (Insomnia). 30 Tablet 2    Melatonin 10 MG Tab Take 10 mg by mouth as needed. (Patient not taking: Reported on 7/11/2024)      ramelteon (ROZEREM) 8 MG tablet Take 1 Tablet by mouth every evening. (Patient not taking: Reported on 5/7/2024) 30 Tablet 6    ondansetron (ZOFRAN) 4 MG Tab tablet Take 1 Tablet by mouth every four hours as needed for Nausea/Vomiting (for nausea, vomiting). (Patient not taking: Reported on 7/11/2024) 30 Tablet 6    prochlorperazine (COMPAZINE) 10 MG Tab Take 1 Tablet by mouth every 6 hours as needed (for nausea, vomiting). (Patient not taking: Reported on 7/11/2024) 30 Tablet 6    lidocaine (XYLOCAINE) 2 % Solution OK to mix with 1:1 Mylanta/Maalox - use 5mL solution every 1 hour PRN for oral discomfort (Patient not taking: Reported on 7/11/2024) 100 mL 1    ezetimibe (ZETIA) 10 MG Tab Take 1 Tablet by mouth at bedtime. 100 Tablet 3    metoprolol SR (TOPROL XL) 25 MG TABLET SR 24 HR Take 1 Tablet by mouth every day. 90 Tablet 3    rosuvastatin (CRESTOR) 40 MG tablet Take 1 Tablet by mouth at bedtime. 90 Tablet 3    aspirin EC (ECOTRIN) 81 MG TBEC Take 81 mg by mouth every day.       No current facility-administered medications  for this encounter.           Marilyn Sanchez, Med Ass't

## 2024-09-16 ENCOUNTER — APPOINTMENT (OUTPATIENT)
Dept: HEMATOLOGY ONCOLOGY | Facility: MEDICAL CENTER | Age: 69
End: 2024-09-16
Payer: MEDICARE

## 2024-10-14 NOTE — PROGRESS NOTES
Nutrition Services: Brief Update  Wt Readings from Last 7 Encounters:   04/10/24 81.2 kg (179 lb)   04/08/24 79 kg (174 lb 2.6 oz)   04/02/24 80.1 kg (176 lb 9.4 oz)   04/02/24 78.8 kg (173 lb 12.8 oz)   03/26/24 80.3 kg (177 lb)   05/03/23 83.9 kg (185 lb)   12/07/21 83.1 kg (183 lb 1.6 oz)     Weight Change: Pt's weight is up 1.1 kg in the past 1 week.     Pertinent Recent Lab work (DATE 4/8): Glucose 145, Bun 25    RD able to visit pt at [facility] with his spouse Altagracia. Per pt, he is following the same diet regimen aiming for 4838-8458 kcals/d, drinking 2-3 Ensure Complete daily and drinking high calories protein shakes. Per Altagracia, pt is now eating more solid foods. Pt reports he recently has gained weight. Per pt, he was meeting/ having virtual visits with RD at [facility] but plans to now f/u with oncology RD at [facility] since he is getting treatment here. Pt did not have any nutrition questions at this time. He and his spouse asked for RD contact info.     Plan/Recommend:  RD provided contact information, RD previously provided contact info through [facility] as well  Discussed with pt continuing to follow his current diet regimen  Recommended to continue including Ensure Complete 2-3x/day    Pt verbalizes understanding and is receptive to information provided.   RD available PRN and will make further recommendations as indicated within policy.    [phone]     Concern for worsening infectious process

## 2024-11-19 ASSESSMENT — LIFESTYLE VARIABLES
TOBACCO_USE: NO
SMOKING_STATUS: NO

## 2024-11-22 ENCOUNTER — HOSPITAL ENCOUNTER (OUTPATIENT)
Dept: RADIATION ONCOLOGY | Facility: MEDICAL CENTER | Age: 69
End: 2024-11-22
Attending: RADIOLOGY
Payer: MEDICARE

## 2024-11-22 VITALS
BODY MASS INDEX: 27.73 KG/M2 | DIASTOLIC BLOOD PRESSURE: 75 MMHG | SYSTOLIC BLOOD PRESSURE: 113 MMHG | WEIGHT: 171.74 LBS | TEMPERATURE: 97.6 F | OXYGEN SATURATION: 97 % | HEART RATE: 71 BPM

## 2024-11-22 ASSESSMENT — FIBROSIS 4 INDEX: FIB4 SCORE: 1.89

## 2024-11-22 ASSESSMENT — PAIN SCALES - GENERAL: PAINLEVEL_OUTOF10: NO PAIN

## 2024-11-22 NOTE — PROGRESS NOTES
Patient was seen today in clinic with Dr. Shelby for follow up.  Vitals signs and weight were obtained and pain assessment was completed.  Allergies and medications were reviewed with the patient.  Toxicities of treatment assessed.     Vitals/Pain:  Vitals:    11/22/24 0955   BP: 113/75   BP Location: Right arm   Patient Position: Sitting   BP Cuff Size: Adult   Pulse: 71   Temp: 36.4 °C (97.6 °F)   TempSrc: Temporal   SpO2: 97%   Weight: 77.9 kg (171 lb 11.8 oz)   Pain Score: No pain        Allergies:   Patient has no known allergies.    Current Medications:  Current Outpatient Medications   Medication Sig Dispense Refill    pilocarpine (SALAGEN) 5 MG Tab Take 1 Tablet by mouth 3 times a day. 90 Tablet 5    Doxepin HCl 6 MG Tab Take 6 mg by mouth at bedtime as needed (Insomnia). 30 Tablet 2    Melatonin 10 MG Tab Take 10 mg by mouth as needed. (Patient not taking: Reported on 7/11/2024)      ramelteon (ROZEREM) 8 MG tablet Take 1 Tablet by mouth every evening. (Patient not taking: Reported on 5/7/2024) 30 Tablet 6    ondansetron (ZOFRAN) 4 MG Tab tablet Take 1 Tablet by mouth every four hours as needed for Nausea/Vomiting (for nausea, vomiting). (Patient not taking: Reported on 7/11/2024) 30 Tablet 6    prochlorperazine (COMPAZINE) 10 MG Tab Take 1 Tablet by mouth every 6 hours as needed (for nausea, vomiting). (Patient not taking: Reported on 7/11/2024) 30 Tablet 6    lidocaine (XYLOCAINE) 2 % Solution OK to mix with 1:1 Mylanta/Maalox - use 5mL solution every 1 hour PRN for oral discomfort (Patient not taking: Reported on 7/11/2024) 100 mL 1    ezetimibe (ZETIA) 10 MG Tab Take 1 Tablet by mouth at bedtime. 100 Tablet 3    metoprolol SR (TOPROL XL) 25 MG TABLET SR 24 HR Take 1 Tablet by mouth every day. 90 Tablet 3    rosuvastatin (CRESTOR) 40 MG tablet Take 1 Tablet by mouth at bedtime. 90 Tablet 3    aspirin EC (ECOTRIN) 81 MG TBEC Take 81 mg by mouth every day.       No current facility-administered  medications for this encounter.           Marilyn Sanchez, Med Ass't

## 2024-11-22 NOTE — PROGRESS NOTES
RADIATION ONCOLOGY FOLLOW-UP    Patient name:  Sarabjit Almendarez    Primary Physician:  Mundo Van M.D. MRN: [id number]  [facility]: [id number]   Care Team:  Patient Care Team:  Mundo Van M.D. as PCP - General ([facility] Medicine)  Michael JOHNSON M.D. as Radiation Oncologist (Radiation Oncology)  Don Hinkle M.D. (Family Medicine)  JAKOB Danielson (Medical Oncology)  : 1955, 68 y.o.     DATE OF SERVICE:   2024    IDENTIFICATION:   A 68 y.o. 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 on 3/28/2024  Histopathologic type: Squamous cell carcinoma, NOS  Stage prefix: Initial diagnosis  Residual tumor (R): R0 - None  ECOG performance status: Grade 0  Karnofsky performance status: Score 100  Prognostic indicators: BERTRAM <2mm      RADIATION SUMMARY:  Radiation Oncology          2024   Aria Course Treatment Dates   Course First Treatment Date 2024    Course Last Treatment Date 2024    Aria Treatment Summary   L Oroph H,N M  Plan from Course [id number]   Fraction 32 of 33 33 of 33    33 of 33   Elapsed Course Days 43 @  44 @     44 @    Prescribed Fraction Dose 200 cGy 200 cGy    200 cGy   Prescribed Total Dose 6,600 cGy 6,600 cGy    6,600 cGy   L Oroph H,N  Reference Point from Course [id number]   Elapsed Course Days 43 @  44 @     44 @    Session Dose 200 cGy 200 cGy    --   Total Dose 6,400 cGy 6,600 cGy    6,600 cGy      Details          More values are hidden. Newest values shown. Go to activity for more data.                HISTORY OF PRESENT ILLNESS:   Subjective    68-year-old male,  at [facility].  Non-smoker nondrinker.  Presented with palpable left neck mass October  2023.  Denies any dysphagia, odynophagia or otalgia.     CT soft tissue neck 12/19/2023 demonstrated left neck level 2 lymph node measuring 1.8 x 1.7 cm.     1/2/2024 saw Dr. Silverio core biopsy left cervical lymph node performed demonstrating epithelial elements most consistent with squamous cell carcinoma p16 positive.     Seen by Dr. Maldonado at [place] in consult 1/29/2024.  On exam noted to have a 2 cm lesion involving the left glossopharyngeal sulcus along with the left level 2 neck node.     2/8/2024 PET/CT scan demonstrated mass left base of tongue with 2 metastatic left cervical lymph nodes.     2/27/2024 underwent tors excision of left glossopharyngeal sulcus mass and left neck dissection along with dental extraction of tooth #26.  Pathology demonstrated squamous cell carcinoma HPV associated with focal intratumoral perineural invasion and monica metastasis.  Tumor present at deep tissue edge 1.5 mm from the posterior mucosal edge.  Additional deep margin left constrictor muscle and left styloglossus positive for carcinoma.  Additional deep margin focal detached tumor.  1 level 2 lymph node positive for metastatic squamous cell carcinoma with extranodal extension present less than 2 mm.  28 additional lymph nodes level 2B3 and 4 negative for metastatic disease.     He is 4 and half weeks postop.  States having some dysphagia post surgery which is improving.  Reports some numbness in his left mandible submandibular region postoperatively.     7/11/2024     Scheduled follow-up visit.  Overall doing well, initial visit post completion of radiotherapy.  Does have some dry mouth but taste improving.  Dry mouth is contributing to mild dysphagia requires considerable amount of water to wash down his food.  Getting regular dental checkups.  No sore throat.     Functional Assessment of Cancer Therapy?Head and Neck Radiotherapy measure (FACT-HN_RAD) 0-4  Statement Not at all A little bit Somewhat Quite a bit Very  "much   My voice has its usual quality and strength []  []  []  [x]  []    I can enjoy the taste of food []  []  [x]  []  []    I can swallow naturally and easily []  [x]  []  []  []    I have pain in my mouth, throat, or neck [x]  []  []  []  []    My mouth is dry []  []  []  [x]  []    I am bothered by skin problems [x]  []  []  []  []    I am worried about my weight []  [x]  []  []  []    My fatigue keeps me from doing the things I want to do [x]  []  []  []  []    \"Below is a list of statements that other people with your illness said are important.  Please [place] or nino one number per line to indicate your response as it applies to the past 7 days\"  ROMAN Otolaryngol Head Neck Surg. doi:[email or web address]    9/13/2024     Scheduled follow-up visit after restaging PET CT scan.  Overall doing well.  Primary complaint is dry mouth.  He also has some mild lymphedema in his neck.  Very active playing golf regularly.  PET CT scan shows no evidence of residual disease or new disease.  Functional Assessment of Cancer Therapy?Head and Neck Radiotherapy measure (FACT-HN_RAD) 0-4  Statement Not at all A little bit Somewhat Quite a bit Very much   My voice has its usual quality and strength []  []  []  [x]  []    I can enjoy the taste of food []  []  []  [x]  []    I can swallow naturally and easily []  []  [x]  []  []    I have pain in my mouth, throat, or neck [x]  []  []  []  []    My mouth is dry []  []  []  []  [x]    I am bothered by skin problems [x]  []  []  []  []    I am worried about my weight []  []  []  [x]  []    My fatigue keeps me from doing the things I want to do [x]  []  []  []  []    \"Below is a list of statements that other people with your illness said are important.  Please [place] or [place] one number per line to indicate your response as it applies to the past 7 days\"  ROMAN Otolaryngol Head Neck Surg. doi:[email or web address]        INTERVAL HISTORY:  11/22/2024     Primary complaint dry " "mouth.  Taste improved.  Getting regular dental care.  Functional Assessment of Cancer Therapy?Head and Neck Radiotherapy measure (FACT-HN_RAD) 0-4  Statement Not at all A little bit Somewhat Quite a bit Very much   My voice has its usual quality and strength [] [] [] [] [x]   I can enjoy the taste of food [] [] [] [x] []   I can swallow naturally and easily [] [] [x] [] []   I have pain in my mouth, throat, or neck [x] [] [] [] []   My mouth is dry [] [] [x] [] []   I am bothered by skin problems [x] [] [] [] []   I am worried about my weight [x] [] [] [] []   My fatigue keeps me from doing the things I want to do [x] [] [] [] []   \"Below is a list of statements that other people with your illness said are important.  Please [place] or nino one number per line to indicate your response as it applies to the past 7 days\"  ROMAN Otolaryngol Head Neck Surg. doi:[email or web address]    CURRENT MEDICATIONS:  Current Outpatient Medications   Medication Sig Dispense Refill    pilocarpine (SALAGEN) 5 MG Tab Take 1 Tablet by mouth 3 times a day. 90 Tablet 5    Doxepin HCl 6 MG Tab Take 6 mg by mouth at bedtime as needed (Insomnia). 30 Tablet 2    Melatonin 10 MG Tab Take 10 mg by mouth as needed. (Patient not taking: Reported on 7/11/2024)      ramelteon (ROZEREM) 8 MG tablet Take 1 Tablet by mouth every evening. (Patient not taking: Reported on 5/7/2024) 30 Tablet 6    ondansetron (ZOFRAN) 4 MG Tab tablet Take 1 Tablet by mouth every four hours as needed for Nausea/Vomiting (for nausea, vomiting). (Patient not taking: Reported on 7/11/2024) 30 Tablet 6    prochlorperazine (COMPAZINE) 10 MG Tab Take 1 Tablet by mouth every 6 hours as needed (for nausea, vomiting). (Patient not taking: Reported on 7/11/2024) 30 Tablet 6    lidocaine (XYLOCAINE) 2 % Solution OK to mix with 1:1 Mylanta/Maalox - use 5mL solution every 1 hour PRN for oral discomfort (Patient not taking: Reported on 7/11/2024) 100 mL 1    ezetimibe (ZETIA) 10 " "MG Tab Take 1 Tablet by mouth at bedtime. 100 Tablet 3    metoprolol SR (TOPROL XL) 25 MG TABLET SR 24 HR Take 1 Tablet by mouth every day. 90 Tablet 3    rosuvastatin (CRESTOR) 40 MG tablet Take 1 Tablet by mouth at bedtime. 90 Tablet 3    aspirin EC (ECOTRIN) 81 MG TBEC Take 81 mg by mouth every day.       No current facility-administered medications for this encounter.       ALLERGIES:  Patient has no known allergies.    REVIEW OF SYSTEMS:    A complete review of system taken. Pertinent items in HPI.  All other negative.    PHYSICAL EXAM:   PERFORMANCE STATUS: 100       No data to display                  11/22/2024     9:55 AM 9/13/2024     9:00 AM 7/11/2024     8:51 AM 7/11/2024     7:57 AM 5/21/2024     8:37 AM 5/21/2024     7:57 AM 5/14/2024     7:56 AM   Vitals   SYSTOLIC 113 123 114 130 104 131 110   DIASTOLIC 75 78 64 79 64 75 65   Pulse 71 70 71 72 75 77 79   Temperature 36.4 °C (97.6 °F) 37.1 °C (98.7 °F) 37.4 °C (99.3 °F) 36.9 °C (98.5 °F) 36.8 °C (98.3 °F) 36.4 °C (97.6 °F) 37.1 °C (98.7 °F)   Respiration      16    Weight 171.74 166.01 167.99 165 166.8 164.3 173   Height   1.676 m (5' 5.98\")  1.676 m (5' 6\")     BMI 27.73 kg/m2 26.81 kg/m2 27.13 kg/m2 26.63 kg/m2 26.92 kg/m2 25.79 kg/m2 27.15 kg/m2   Pulse Oximetry 97 % 94 % 97 % 94 % 93 % 95 % 94 %       Physical Exam  Vitals and nursing note reviewed.   Constitutional:       General: He is not in acute distress.     Appearance: He is well-developed.   HENT:      Head: Normocephalic.      Mouth/Throat:      Mouth: Mucous membranes are moist.      Pharynx: Oropharynx is clear. No oropharyngeal exudate or posterior oropharyngeal erythema.   Eyes:      Conjunctiva/sclera: Conjunctivae normal.      Pupils: Pupils are equal, round, and reactive to light.   Cardiovascular:      Rate and Rhythm: Normal rate and regular rhythm.   Pulmonary:      Effort: Pulmonary effort is normal.   Musculoskeletal:         General: No deformity. Normal range of motion.      " Cervical back: Normal range of motion and neck supple.   Lymphadenopathy:      Cervical: No cervical adenopathy.   Skin:     General: Skin is warm and dry.      Findings: No erythema.   Neurological:      Mental Status: He is alert and oriented to person, place, and time.      Cranial Nerves: No cranial nerve deficit.      Sensory: No sensory deficit.      Motor: No abnormal muscle tone.      Coordination: Coordination normal.      Deep Tendon Reflexes: Reflexes normal.   Psychiatric:         Behavior: Behavior normal.         Thought Content: Thought content normal.         Judgment: Judgment normal.           FIBEROPTIC EXAM:  Normal exam    LABORATORY DATA:   Lab Results   Component Value Date/Time    WBC 6.6 11/15/2024 07:51 AM    WBC 5.3 05/13/2024 09:05 AM    RBC 4.97 11/15/2024 07:51 AM    RBC 4.05 (L) 05/13/2024 09:05 AM    HEMOGLOBIN 14.2 11/15/2024 07:51 AM    HEMOGLOBIN 11.7 (L) 05/13/2024 09:05 AM    HEMATOCRIT 43.8 11/15/2024 07:51 AM    HEMATOCRIT 33.9 (L) 05/13/2024 09:05 AM    MCV 88 11/15/2024 07:51 AM    MCV 83.7 05/13/2024 09:05 AM    MCH 28.6 11/15/2024 07:51 AM    MCH 28.9 05/13/2024 09:05 AM    MCHC 32.4 11/15/2024 07:51 AM    MCHC 34.5 05/13/2024 09:05 AM    RDW 12.9 11/15/2024 07:51 AM    RDW 40.1 05/13/2024 09:05 AM    PLATELETCT 204 11/15/2024 07:51 AM    PLATELETCT 141 (L) 05/13/2024 09:05 AM    MPV 9.8 05/13/2024 09:05 AM    NEUTSPOLYS 68 11/15/2024 07:51 AM    NEUTSPOLYS 80.60 (H) 05/13/2024 09:05 AM    LYMPHOCYTES 15 11/15/2024 07:51 AM    LYMPHOCYTES 7.50 (L) 05/13/2024 09:05 AM    MONOCYTES 10 11/15/2024 07:51 AM    MONOCYTES 10.20 05/13/2024 09:05 AM    EOSINOPHILS 5 11/15/2024 07:51 AM    EOSINOPHILS 1.10 05/13/2024 09:05 AM    BASOPHILS 1 11/15/2024 07:51 AM    BASOPHILS 0.20 05/13/2024 09:05 AM      Lab Results   Component Value Date/Time    SODIUM 143 11/15/2024 07:51 AM    SODIUM 137 05/13/2024 09:05 AM    POTASSIUM 4.9 11/15/2024 07:51 AM    POTASSIUM 4.7 05/13/2024 09:05 AM     CHLORIDE 104 11/15/2024 07:51 AM    CHLORIDE 103 05/13/2024 09:05 AM    CO2 27 11/15/2024 07:51 AM    CO2 22 05/13/2024 09:05 AM    GLUCOSE 97 11/15/2024 07:51 AM    GLUCOSE 100 (H) 05/13/2024 09:05 AM    BUN 26 11/15/2024 07:51 AM    BUN 29 (H) 05/13/2024 09:05 AM    CREATININE 1.06 11/15/2024 07:51 AM    CREATININE 0.77 05/13/2024 09:05 AM    CREATININE 1.0 08/07/2006 10:20 AM    BUNCREATRAT 25 (H) 11/15/2024 07:51 AM    BUNCREATRAT 16 01/21/2015 08:52 AM         RADIOLOGY DATA:  MR Nasopharynx Oropharynx w and wo IV Contrast    Result Date: 10/7/2024  IMPRESSION: 1.  Primary: NI-RADS 1f. Expected post-treatment changes of the neck without evidence of disease recurrence. 2.  Neck: NI-RADS 1. No cervical lymphadenopathy. Author: Genaro Dinh MD Contributing Providers: Attending:  Penny Mejía MD ----------------------------------------------------------------- The below represents general reference information on NI-RADS scoring. For individual patient scores, see the IMPRESSION above. NI-RADS Legend Primary 0: Baseline imaging not available: obtain outside imaging after which an addendum will be issued 1: No evidence of recurrence: routine surveillance    f) No evidence of recurrence: first post-treatment baseline study 2: Low suspicion    a) Superficial abnormality (skin, mucosal surface): direct visual inspection    b) Ill-defined deep abnormality: short interval follow-up (3-6 months) or PET    f) First post-treatment baseline study: direct visual inspection, or short interval follow-up (3-6 months) or PET depending on location 3: High suspicion (new or enlarging discrete nodule/mass): biopsy if clinically needed 4: Definitive recurrence (path proven or clinical progression): no biopsy needed Neck: 0: Baseline imaging not available: obtain outside imaging after which an addendum will be issued 1: No evidence of recurrence: routine surveillance 2: Low suspicion (ill-defined): short interval follow-up  (3-6 months) or PET 3: High suspicion (new or enlarging lymph node): biopsy if clinically needed 4: Definitive recurrence (path proven or clinical progression): no biopsy needed NIRADS is a structured reporting system for patients with treated head and neck cancer.  It was developed to provide standardized numerical levels of suspicion to guide patient care, with linked management recommendations.  More information can be found: Jayleen AH, Elijah TJ, Berenice Y, et al. ACR Neck Imaging Reporting and Data Systems (NI-RADS): A White Paper of the ACR NI-RADS Committee. J Am Lauren Radiol. 2018 Aug;15(8):9552-7457. NI-RADSTM MRI Category Descriptors, Imaging Findings, and Management: [email or web address] Please note that as with any imaging study, clinical decisions should be undertaken in consultation with appropriate treating physicians.  These recommendations may not apply to patients on clinical trials which have specific protocols for surveillance and treatment. I have personally reviewed the images for this examination and agree with the report transcribed above.      IMPRESSION:    A 68 y.o. 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 on 3/28/2024  Histopathologic type: Squamous cell carcinoma, NOS  Stage prefix: Initial diagnosis  Residual tumor (R): R0 - None  ECOG performance status: Grade 0  Karnofsky performance status: Score 100  Prognostic indicators: BERTRAM <2mm      CANCER STATUS:  No Evidence of Disease    RECOMMENDATIONS:   Reviewed exam findings.  Reassured him.  He will see Dr. Silverio in December and return here for follow-up in January 2024        Thank you for the opportunity to participate in his care.  If any questions or comments, please do not hesitate in calling.    No  orders of the defined types were placed in this encounter.

## 2024-11-22 NOTE — PROCEDURES
DATE OF SERVICE: 11/22/2024         FIBEROPTIC NASO-PHARYNGOSCOPY NOTE      Flexible fiberoptic exam was performed after anesthesia of left nostril and oropharynx.    Nasopharynx:       R. Eustachian canal opening, torus, fossa of Rosenmuller appear normal  L. Eustachian canal opening, torus, fossa of Rosenmuller appear normal      Oropharynx:        Base of tongue normal appearing.  Vallecula normal appearing.  Epiglottis normal appearing.  PE folds normal appearing.    Larynx:      R. AE fold, false vocal fold, arytenoid appear normal  L. AE fold, false vocal fold, arytenoid appear normal  R. Cord mobile  L. Cord mobile   R. Piriform sinus appears normal  L. Piriform sinus appears normal      Michael JOHNSON M.D.  Electronically signed by: Michael JOHNSON M.D., 11/22/2024 10:24 AM  [phone]

## 2025-01-09 ENCOUNTER — OFFICE VISIT (OUTPATIENT)
Dept: CARDIOLOGY | Facility: MEDICAL CENTER | Age: 70
End: 2025-01-09
Payer: MEDICARE

## 2025-01-09 VITALS
WEIGHT: 173 LBS | BODY MASS INDEX: 27.94 KG/M2 | SYSTOLIC BLOOD PRESSURE: 104 MMHG | OXYGEN SATURATION: 95 % | DIASTOLIC BLOOD PRESSURE: 64 MMHG | RESPIRATION RATE: 16 BRPM | HEART RATE: 67 BPM

## 2025-01-09 DIAGNOSIS — I49.3 PREMATURE VENTRICULAR CONTRACTIONS: ICD-10-CM

## 2025-01-09 DIAGNOSIS — I25.83 CORONARY ARTERY DISEASE DUE TO LIPID RICH PLAQUE: ICD-10-CM

## 2025-01-09 DIAGNOSIS — E78.2 MIXED HYPERLIPIDEMIA: ICD-10-CM

## 2025-01-09 DIAGNOSIS — R00.2 PALPITATIONS: ICD-10-CM

## 2025-01-09 DIAGNOSIS — I10 ESSENTIAL HYPERTENSION: ICD-10-CM

## 2025-01-09 DIAGNOSIS — I25.10 CORONARY ARTERY DISEASE DUE TO LIPID RICH PLAQUE: ICD-10-CM

## 2025-01-09 LAB — EKG IMPRESSION: NORMAL

## 2025-01-09 PROCEDURE — 93005 ELECTROCARDIOGRAM TRACING: CPT | Mod: TC

## 2025-01-09 PROCEDURE — 99214 OFFICE O/P EST MOD 30 MIN: CPT

## 2025-01-09 PROCEDURE — 3078F DIAST BP <80 MM HG: CPT

## 2025-01-09 PROCEDURE — 99211 OFF/OP EST MAY X REQ PHY/QHP: CPT

## 2025-01-09 PROCEDURE — 3074F SYST BP LT 130 MM HG: CPT

## 2025-01-09 PROCEDURE — 93010 ELECTROCARDIOGRAM REPORT: CPT | Performed by: INTERNAL MEDICINE

## 2025-01-09 RX ORDER — ROSUVASTATIN CALCIUM 40 MG/1
40 TABLET, COATED ORAL
Qty: 100 TABLET | Refills: 3 | Status: SHIPPED | OUTPATIENT
Start: 2025-01-09

## 2025-01-09 RX ORDER — METOPROLOL SUCCINATE 25 MG/1
25 TABLET, EXTENDED RELEASE ORAL DAILY
Qty: 100 TABLET | Refills: 3 | Status: SHIPPED | OUTPATIENT
Start: 2025-01-09

## 2025-01-09 RX ORDER — EZETIMIBE 10 MG/1
10 TABLET ORAL
Qty: 100 TABLET | Refills: 3 | Status: SHIPPED | OUTPATIENT
Start: 2025-01-09

## 2025-01-09 ASSESSMENT — ENCOUNTER SYMPTOMS
MUSCULOSKELETAL NEGATIVE: 1
GASTROINTESTINAL NEGATIVE: 1
NERVOUS/ANXIOUS: 0
PALPITATIONS: 0
CONSTITUTIONAL NEGATIVE: 1
SHORTNESS OF BREATH: 0
EYES NEGATIVE: 1
NEUROLOGICAL NEGATIVE: 1
CLAUDICATION: 0
PND: 0
ORTHOPNEA: 0
DEPRESSION: 0

## 2025-01-09 ASSESSMENT — FIBROSIS 4 INDEX: FIB4 SCORE: 1.92

## 2025-01-09 NOTE — PROGRESS NOTES
No chief complaint on file.      Subjective     Sarabjit Almendarez is a 69 y.o. male with a history of CAD s/p CABG in 2005, HTN, HLD, and squamous cell carcinoma of the tongue who presents today for follow up. Patient is doing well. Denies chest pain, heart palpitations, shortness of breath, claudication, edema.     He has completed treatment for SCC of the tongue and is now cancer free. He notes dry mouth, dysphagia, and a tightness sensation to his left sided neck since after radiation.     He continues to walk 2-4 miles every day and plays golf.    Past Medical History:   Diagnosis Date    Arrhythmia     CAD (coronary artery disease)     CABG     Hyperlipidemia     Palpitations     PVC's     Past Surgical History:   Procedure Laterality Date    MULTIPLE CORONARY ARTERY BYPASS      TONSILLECTOMY Bilateral     ZZZ CARDIAC CATH       Family History   Problem Relation Age of Onset    Heart Disease Other      Social History     Socioeconomic History    Marital status:      Spouse name: Not on file    Number of children: Not on file    Years of education: Not on file    Highest education level: Not on file   Occupational History    Not on file   Tobacco Use    Smoking status: Never    Smokeless tobacco: Never   Vaping Use    Vaping status: Never Used   Substance and Sexual Activity    Alcohol use: No    Drug use: Never    Sexual activity: Not on file   Other Topics Concern    Not on file   Social History Narrative    Not on file     Social Drivers of Health     Financial Resource Strain: Not on file   Food Insecurity: Not At Risk (2/27/2024)    Received from  and [facility],  and [facility]    Food Insecurity     Because difficulties at home can cause health problems, we are asking all of our patients if they are experiencing difficulties in any of the following areas:: Patient does not need help with any of these   Transportation Needs: Not At  Risk (2/27/2024)    Received from [facility], [facility] and [facility]    Food Insecurity     Because difficulties at home can cause health problems, we are asking all of our patients if they are experiencing difficulties in any of the following areas:: Patient does not need help with any of these   Physical Activity: Not on file   Stress: Not on file   Social Connections: Not on file   Intimate Partner Violence: Not on file   Housing Stability: Not At Risk (2/27/2024)    Received from [facility], [facility] and [facility]    Food Insecurity     Because difficulties at home can cause health problems, we are asking all of our patients if they are experiencing difficulties in any of the following areas:: Patient does not need help with any of these     No Known Allergies  Outpatient Encounter Medications as of 1/9/2025   Medication Sig Dispense Refill    rosuvastatin (CRESTOR) 40 MG tablet Take 1 Tablet by mouth at bedtime. 100 Tablet 3    metoprolol SR (TOPROL XL) 25 MG TABLET SR 24 HR Take 1 Tablet by mouth every day. 100 Tablet 3    ezetimibe (ZETIA) 10 MG Tab Take 1 Tablet by mouth at bedtime. 100 Tablet 3    aspirin EC (ECOTRIN) 81 MG TBEC Take 81 mg by mouth every day.      [DISCONTINUED] pilocarpine (SALAGEN) 5 MG Tab Take 1 Tablet by mouth 3 times a day. 90 Tablet 5    [DISCONTINUED] Doxepin HCl 6 MG Tab Take 6 mg by mouth at bedtime as needed (Insomnia). 30 Tablet 2    [DISCONTINUED] Melatonin 10 MG Tab Take 10 mg by mouth as needed. (Patient not taking: Reported on 7/11/2024)      [DISCONTINUED] ramelteon (ROZEREM) 8 MG tablet Take 1 Tablet by mouth every evening. (Patient not taking: Reported on 5/7/2024) 30 Tablet 6    [DISCONTINUED] ondansetron (ZOFRAN) 4 MG Tab tablet Take 1 Tablet by mouth every four hours as needed for Nausea/Vomiting (for nausea, vomiting). (Patient  not taking: Reported on 7/11/2024) 30 Tablet 6    [DISCONTINUED] prochlorperazine (COMPAZINE) 10 MG Tab Take 1 Tablet by mouth every 6 hours as needed (for nausea, vomiting). (Patient not taking: Reported on 7/11/2024) 30 Tablet 6    [DISCONTINUED] lidocaine (XYLOCAINE) 2 % Solution OK to mix with 1:1 Mylanta/Maalox - use 5mL solution every 1 hour PRN for oral discomfort (Patient not taking: Reported on 7/11/2024) 100 mL 1    [DISCONTINUED] ezetimibe (ZETIA) 10 MG Tab Take 1 Tablet by mouth at bedtime. 100 Tablet 3    [DISCONTINUED] metoprolol SR (TOPROL XL) 25 MG TABLET SR 24 HR Take 1 Tablet by mouth every day. 90 Tablet 3    [DISCONTINUED] rosuvastatin (CRESTOR) 40 MG tablet Take 1 Tablet by mouth at bedtime. 90 Tablet 3     No facility-administered encounter medications on file as of 1/9/2025.     Review of Systems   Respiratory:  Negative for shortness of breath.    Cardiovascular:  Negative for chest pain, palpitations, claudication and leg swelling.              Objective     /64 (BP Location: Left arm, Patient Position: Sitting, BP Cuff Size: Adult)   Pulse 67   Resp 16   Wt 78.5 kg (173 lb)   SpO2 95%   BMI 27.94 kg/m²     Physical Exam  Constitutional:       Appearance: Normal appearance.   HENT:      Head: Atraumatic.   Cardiovascular:      Rate and Rhythm: Normal rate and regular rhythm.   Musculoskeletal:      Right lower leg: No edema.      Left lower leg: No edema.   Neurological:      General: No focal deficit present.      Mental Status: He is alert and oriented to person, place, and time. Mental status is at baseline.   Psychiatric:         Mood and Affect: Mood normal.         Behavior: Behavior normal.         Thought Content: Thought content normal.                Assessment & Plan     No diagnosis found.    Medical Decision Making: Today's Assessment/Status/Plan:        Coronary Artery Disease  - Status post bypass in 2005  - Continue aspirin 81 mg daily, metoprolol 25 mg daily,  rosuvastatin 40 mg daily, Zetia 10 mg daily  - Echocardiogram 4/2021 was unremarkable with LVEF 60%.  - EKG today showed first degree heart block. Sinus rhythm rate 68.  - We addressed the management of atherosclerotic artery disease.  He is on proper antiplatelet, cholesterol management and beta-blockers as appropriate.  We addressed the potential side effects and laboratory follow-up for these medications.  - Continue annual follow up.      Hypertension  -Good control  - continue medications as outlined above  - goal < 130/80  - check BP daily 2 hours after taking medication and keep log of measurements      Hyperlipidemia  -Most recent LDL 42 per labs 11/2024  -Continue rosuvastatin and Zetia  -Goal of less than 70

## 2025-01-09 NOTE — PROGRESS NOTES
Chief Complaint   Patient presents with    Hyperlipidemia     Fv Dx Mixed hyperlipidemia       Hypertension     Fv Dx Essential hypertension    Coronary Artery Disease     Fv Dx Coronary artery disease due to lipid rich plaque          Subjective     Sarabjit Almendarez is a 69 y.o. male who presents today for follow up. They have a history of CAD s/p CABG in 2005, HTN, HLD.     They are a patient of Dr. Viveros, last seen on 12/7/2021.  At that visit he was doing well over all, no complaints, he had had an echocardiogram and a stress test for some shortness of breath at his previous visit but these were both negative.     Seen by myself on 5/3/2023 since their last visit they have been feeling really well, continues to have no cardiovascular complaints. Activity: loves to golf, coaches at the [employer]     Since his last visit he unfortunately had diagnosis of squamous cell carcinoma and has undergone surgical resection, he will be starting radiation therapy soon.  Today 3/26/2024 doing well from cardiac prospective.  He denies acute cardiovascular symptoms.  He is currently walking 2-4 miles per day.    1/9/2025 he has been doing really well overall, he is cancer free.  He has been walking about 2 to 4 miles a day without acute cardiovascular complaints.  He does mention some unusual symptoms of fullness in the balls of his feet that apparently his oncologist said it could be related to the chemo as well as some intermittent pressure in the left side of his neck that is relieved by massage.       Past Medical History:   Diagnosis Date    Arrhythmia     CAD (coronary artery disease)     CABG     Hyperlipidemia     Palpitations     PVC's     Past Surgical History:   Procedure Laterality Date    MULTIPLE CORONARY ARTERY BYPASS      TONSILLECTOMY Bilateral     ZZZ CARDIAC CATH       Family History   Problem Relation Age of Onset    Heart Disease Other      Social History     Socioeconomic History    Marital status:       Spouse name: Not on file    Number of children: Not on file    Years of education: Not on file    Highest education level: Not on file   Occupational History    Not on file   Tobacco Use    Smoking status: Never    Smokeless tobacco: Never   Vaping Use    Vaping status: Never Used   Substance and Sexual Activity    Alcohol use: No    Drug use: Never    Sexual activity: Not on file   Other Topics Concern    Not on file   Social History Narrative    Not on file     Social Drivers of Health     Financial Resource Strain: Not on file   Food Insecurity: Not At Risk (2/27/2024)    Received from [facility], [facility]    Food Insecurity     Because difficulties at home can cause health problems, we are asking all of our patients if they are experiencing difficulties in any of the following areas:: Patient does not need help with any of these   Transportation Needs: Not At Risk (2/27/2024)    Received from [facility], [facility]    Food Insecurity     Because difficulties at home can cause health problems, we are asking all of our patients if they are experiencing difficulties in any of the following areas:: Patient does not need help with any of these   Physical Activity: Not on file   Stress: Not on file   Social Connections: Not on file   Intimate Partner Violence: Not on file   Housing Stability: Not At Risk (2/27/2024)    Received from [facility], [facility]    Food Insecurity     Because difficulties at home can cause health problems, we are asking all of our patients if they are experiencing difficulties in any of the following areas:: Patient does not need help with any of these     No Known Allergies  Outpatient Encounter Medications as of 1/9/2025   Medication  Sig Dispense Refill    rosuvastatin (CRESTOR) 40 MG tablet Take 1 Tablet by mouth at bedtime. 100 Tablet 3    metoprolol SR (TOPROL XL) 25 MG TABLET SR 24 HR Take 1 Tablet by mouth every day. 100 Tablet 3    ezetimibe (ZETIA) 10 MG Tab Take 1 Tablet by mouth at bedtime. 100 Tablet 3    aspirin EC (ECOTRIN) 81 MG TBEC Take 81 mg by mouth every day.      [DISCONTINUED] pilocarpine (SALAGEN) 5 MG Tab Take 1 Tablet by mouth 3 times a day. 90 Tablet 5    [DISCONTINUED] Doxepin HCl 6 MG Tab Take 6 mg by mouth at bedtime as needed (Insomnia). 30 Tablet 2    [DISCONTINUED] Melatonin 10 MG Tab Take 10 mg by mouth as needed. (Patient not taking: Reported on 7/11/2024)      [DISCONTINUED] ramelteon (ROZEREM) 8 MG tablet Take 1 Tablet by mouth every evening. (Patient not taking: Reported on 5/7/2024) 30 Tablet 6    [DISCONTINUED] ondansetron (ZOFRAN) 4 MG Tab tablet Take 1 Tablet by mouth every four hours as needed for Nausea/Vomiting (for nausea, vomiting). (Patient not taking: Reported on 7/11/2024) 30 Tablet 6    [DISCONTINUED] prochlorperazine (COMPAZINE) 10 MG Tab Take 1 Tablet by mouth every 6 hours as needed (for nausea, vomiting). (Patient not taking: Reported on 7/11/2024) 30 Tablet 6    [DISCONTINUED] lidocaine (XYLOCAINE) 2 % Solution OK to mix with 1:1 Mylanta/Maalox - use 5mL solution every 1 hour PRN for oral discomfort (Patient not taking: Reported on 7/11/2024) 100 mL 1    [DISCONTINUED] ezetimibe (ZETIA) 10 MG Tab Take 1 Tablet by mouth at bedtime. 100 Tablet 3    [DISCONTINUED] metoprolol SR (TOPROL XL) 25 MG TABLET SR 24 HR Take 1 Tablet by mouth every day. 90 Tablet 3    [DISCONTINUED] rosuvastatin (CRESTOR) 40 MG tablet Take 1 Tablet by mouth at bedtime. 90 Tablet 3     No facility-administered encounter medications on file as of 1/9/2025.     Review of Systems   Constitutional: Negative.    HENT: Negative.     Eyes: Negative.    Respiratory:  Negative for shortness of breath.    Cardiovascular:  Negative  for chest pain, palpitations, orthopnea, leg swelling and PND.   Gastrointestinal: Negative.    Genitourinary: Negative.    Musculoskeletal: Negative.    Skin: Negative.    Neurological: Negative.    Endo/Heme/Allergies: Negative.    Psychiatric/Behavioral:  Negative for depression. The patient is not nervous/anxious.               Objective     /64 (BP Location: Left arm, Patient Position: Sitting, BP Cuff Size: Adult)   Pulse 67   Resp 16   Wt 78.5 kg (173 lb)   SpO2 95%   BMI 27.94 kg/m²     Physical Exam  Constitutional:       Appearance: Normal appearance.   HENT:      Head: Normocephalic.   Neck:      Vascular: No JVD.   Cardiovascular:      Rate and Rhythm: Normal rate and regular rhythm.      Pulses: Normal pulses.      Heart sounds: Normal heart sounds. No murmur heard.     No friction rub.   Pulmonary:      Effort: Pulmonary effort is normal.      Breath sounds: Normal breath sounds.   Abdominal:      Palpations: Abdomen is soft.   Musculoskeletal:         General: Normal range of motion.      Right lower leg: No edema.      Left lower leg: No edema.   Skin:     General: Skin is warm and dry.   Neurological:      General: No focal deficit present.      Mental Status: He is alert and oriented to person, place, and time.   Psychiatric:         Mood and Affect: Mood normal.         Behavior: Behavior normal.            Lab Results   Component Value Date/Time    WBC 6.6 11/15/2024 07:51 AM    WBC 5.3 05/13/2024 09:05 AM    RBC 4.97 11/15/2024 07:51 AM    RBC 4.05 (L) 05/13/2024 09:05 AM    HEMOGLOBIN 14.2 11/15/2024 07:51 AM    HEMOGLOBIN 11.7 (L) 05/13/2024 09:05 AM    HEMATOCRIT 43.8 11/15/2024 07:51 AM    HEMATOCRIT 33.9 (L) 05/13/2024 09:05 AM    MCV 88 11/15/2024 07:51 AM    MCV 83.7 05/13/2024 09:05 AM    MCH 28.6 11/15/2024 07:51 AM    MCH 28.9 05/13/2024 09:05 AM    MCHC 32.4 11/15/2024 07:51 AM    MCHC 34.5 05/13/2024 09:05 AM    MPV 9.8 05/13/2024 09:05 AM    NEUTSPOLYS 68 11/15/2024 07:51  AM    NEUTSPOLYS 80.60 (H) 05/13/2024 09:05 AM    LYMPHOCYTES 15 11/15/2024 07:51 AM    LYMPHOCYTES 7.50 (L) 05/13/2024 09:05 AM    MONOCYTES 10 11/15/2024 07:51 AM    MONOCYTES 10.20 05/13/2024 09:05 AM    EOSINOPHILS 5 11/15/2024 07:51 AM    EOSINOPHILS 1.10 05/13/2024 09:05 AM    BASOPHILS 1 11/15/2024 07:51 AM    BASOPHILS 0.20 05/13/2024 09:05 AM      Lab Results   Component Value Date/Time    CHOLSTRLTOT 112 02/20/2021 09:18 AM    LDL 37 02/20/2021 09:18 AM    HDL 45 02/20/2021 09:18 AM    TRIGLYCERIDE 148 02/20/2021 09:18 AM       Lab Results   Component Value Date/Time    SODIUM 143 11/15/2024 07:51 AM    SODIUM 137 05/13/2024 09:05 AM    POTASSIUM 4.9 11/15/2024 07:51 AM    POTASSIUM 4.7 05/13/2024 09:05 AM    CHLORIDE 104 11/15/2024 07:51 AM    CHLORIDE 103 05/13/2024 09:05 AM    CO2 27 11/15/2024 07:51 AM    CO2 22 05/13/2024 09:05 AM    GLUCOSE 97 11/15/2024 07:51 AM    GLUCOSE 100 (H) 05/13/2024 09:05 AM    BUN 26 11/15/2024 07:51 AM    BUN 29 (H) 05/13/2024 09:05 AM    CREATININE 1.06 11/15/2024 07:51 AM    CREATININE 0.77 05/13/2024 09:05 AM    CREATININE 1.0 08/07/2006 10:20 AM    BUNCREATRAT 25 (H) 11/15/2024 07:51 AM    BUNCREATRAT 16 01/21/2015 08:52 AM     Lab Results   Component Value Date/Time    ALKPHOSPHAT 69 11/15/2024 07:51 AM    ALKPHOSPHAT 83 04/08/2024 08:48 AM    ASTSGOT 29 11/15/2024 07:51 AM    ASTSGOT 30 04/08/2024 08:48 AM    ALTSGPT 27 11/15/2024 07:51 AM    ALTSGPT 39 04/08/2024 08:48 AM    TBILIRUBIN 1.1 11/15/2024 07:51 AM    TBILIRUBIN 1.0 04/08/2024 08:48 AM                  EKG 1/9/2025  Sinus rhythm, rate 68, 0.215/0.103/0.433    Assessment & Plan     1. Mixed hyperlipidemia  EKG      2. Coronary artery disease due to lipid rich plaque  rosuvastatin (CRESTOR) 40 MG tablet    metoprolol SR (TOPROL XL) 25 MG TABLET SR 24 HR    ezetimibe (ZETIA) 10 MG Tab      3. Essential hypertension  rosuvastatin (CRESTOR) 40 MG tablet    ezetimibe (ZETIA) 10 MG Tab      4. Premature  ventricular contractions  rosuvastatin (CRESTOR) 40 MG tablet      5. Heart palpitations  rosuvastatin (CRESTOR) 40 MG tablet          Medical Decision Making: Today's Assessment/Status/Plan:        Coronary Artery Disease  -Status post bypass in 2005  - continue aspirin 81 mg daily, metoprolol 25 mg daily, rosuvastatin 40 mg daily, Zetia 10 mg daily  We addressed the management of atherosclerotic artery disease.  He is on proper antiplatelet, cholesterol management and beta-blockers as appropriate.  We addressed the potential side effects and laboratory follow-up for these medications.     Hypertension  -Good control  - continue medications as outlined above  - goal < 130/80  - check BP daily 2 hours after taking medication and keep log of measurements      Hyperlipidemia  -Most recent LDL 42  -Continue rosuvastatin and Zetia  -Goal of less than 70  -Check lipid panel annually     Follow-up with JANAY White in 1 year with annual labs     This note was dictated using Dragon speech recognition software.

## 2025-01-21 ENCOUNTER — HOSPITAL ENCOUNTER (OUTPATIENT)
Dept: RADIATION ONCOLOGY | Facility: MEDICAL CENTER | Age: 70
End: 2025-01-21
Attending: RADIOLOGY
Payer: MEDICARE

## 2025-01-21 VITALS
SYSTOLIC BLOOD PRESSURE: 120 MMHG | TEMPERATURE: 98.6 F | BODY MASS INDEX: 28.09 KG/M2 | DIASTOLIC BLOOD PRESSURE: 74 MMHG | OXYGEN SATURATION: 95 % | WEIGHT: 173.94 LBS | HEART RATE: 68 BPM

## 2025-01-21 PROCEDURE — 99213 OFFICE O/P EST LOW 20 MIN: CPT | Mod: 25 | Performed by: RADIOLOGY

## 2025-01-21 PROCEDURE — 31575 DIAGNOSTIC LARYNGOSCOPY: CPT | Performed by: RADIOLOGY

## 2025-01-21 PROCEDURE — 99212 OFFICE O/P EST SF 10 MIN: CPT | Mod: 25 | Performed by: RADIOLOGY

## 2025-01-21 ASSESSMENT — PAIN SCALES - GENERAL: PAINLEVEL_OUTOF10: NO PAIN

## 2025-01-21 ASSESSMENT — FIBROSIS 4 INDEX: FIB4 SCORE: 1.92

## 2025-01-21 NOTE — PROGRESS NOTES
Patient was seen today in clinic with Dr. Shelby for follow up.  Vitals signs and weight were obtained and pain assessment was completed.  Allergies and medications were reviewed with the patient.  Toxicities of treatment assessed.     Vitals/Pain:  Vitals:    01/21/25 0825   BP: 120/74   BP Location: Left arm   Patient Position: Sitting   BP Cuff Size: Adult   Pulse: 68   Temp: 37 °C (98.6 °F)   TempSrc: Temporal   SpO2: 95%   Weight: 78.9 kg (173 lb 15.1 oz)   Pain Score: No pain        Allergies:   Patient has no known allergies.    Current Medications:  Current Outpatient Medications   Medication Sig Dispense Refill    rosuvastatin (CRESTOR) 40 MG tablet Take 1 Tablet by mouth at bedtime. 100 Tablet 3    metoprolol SR (TOPROL XL) 25 MG TABLET SR 24 HR Take 1 Tablet by mouth every day. 100 Tablet 3    ezetimibe (ZETIA) 10 MG Tab Take 1 Tablet by mouth at bedtime. 100 Tablet 3    aspirin EC (ECOTRIN) 81 MG TBEC Take 81 mg by mouth every day.       No current facility-administered medications for this encounter.           Marilyn Sanchez, Med Ass't

## 2025-01-21 NOTE — PROGRESS NOTES
RADIATION ONCOLOGY FOLLOW-UP    Patient name:  Sarabjit Almendarez    Primary Physician:  Mundo Van M.D. MRN: [id number]  [facility]: [id number]   Care Team:  Patient Care Team:  Mundo Van M.D. as PCP - General ([facility])  Michael JOHNSON M.D. as Radiation Oncologist (Radiation Oncology)  Don Hinkle M.D. (Family Medicine)  JAKOB Danielson (Medical Oncology)  Sunil Silverio M.D. (Otolaryngology)  : 1955, 69 y.o.     DATE OF SERVICE:   2025    IDENTIFICATION:   A 69 y.o. 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 on 3/28/2024  Histopathologic type: Squamous cell carcinoma, NOS  Stage prefix: Initial diagnosis  Residual tumor (R): R0 - None  ECOG performance status: Grade 0  Karnofsky performance status: Score 100  Prognostic indicators: BERTRAM <2mm      RADIATION SUMMARY:  Radiation Oncology          2024   Aria Course Treatment Dates   Course First Treatment Date 2024    Course Last Treatment Date 2024    Aria Treatment Summary   L Oroph H,N M  Plan from Course _[id number]   Fraction 32 of 33 33 of 33    33 of 33   Elapsed Course Days 43 @  44 @     44 @    Prescribed Fraction Dose 200 cGy 200 cGy    200 cGy   Prescribed Total Dose 6,600 cGy 6,600 cGy    6,600 cGy   L Oroph H,N  Reference Point from Course [id number]   Elapsed Course Days 43 @  44 @     44 @    Session Dose 200 cGy 200 cGy    --   Total Dose 6,400 cGy 6,600 cGy    6,600 cGy      Details          More values are hidden. Newest values shown. Go to activity for more data.                HISTORY OF PRESENT ILLNESS:   Subjective    68-year-old male,  at [facility].  Non-smoker nondrinker.  Presented  with palpable left neck mass October 2023.  Denies any dysphagia, odynophagia or otalgia.     CT soft tissue neck 12/19/2023 demonstrated left neck level 2 lymph node measuring 1.8 x 1.7 cm.     1/2/2024 saw Dr. Silverio core biopsy left cervical lymph node performed demonstrating epithelial elements most consistent with squamous cell carcinoma p16 positive.     Seen by Dr. Maldonado at [place] in consult 1/29/2024.  On exam noted to have a 2 cm lesion involving the left glossopharyngeal sulcus along with the left level 2 neck node.     2/8/2024 PET/CT scan demonstrated mass left base of tongue with 2 metastatic left cervical lymph nodes.     2/27/2024 underwent tors excision of left glossopharyngeal sulcus mass and left neck dissection along with dental extraction of tooth #26.  Pathology demonstrated squamous cell carcinoma HPV associated with focal intratumoral perineural invasion and monica metastasis.  Tumor present at deep tissue edge 1.5 mm from the posterior mucosal edge.  Additional deep margin left constrictor muscle and left styloglossus positive for carcinoma.  Additional deep margin focal detached tumor.  1 level 2 lymph node positive for metastatic squamous cell carcinoma with extranodal extension present less than 2 mm.  28 additional lymph nodes level 2B3 and 4 negative for metastatic disease.     He is 4 and half weeks postop.  States having some dysphagia post surgery which is improving.  Reports some numbness in his left mandible submandibular region postoperatively.     7/11/2024     Scheduled follow-up visit.  Overall doing well, initial visit post completion of radiotherapy.  Does have some dry mouth but taste improving.  Dry mouth is contributing to mild dysphagia requires considerable amount of water to wash down his food.  Getting regular dental checkups.  No sore throat.     Functional Assessment of Cancer Therapy?Head and Neck Radiotherapy measure (FACT-HN_RAD) 0-4  Statement Not at all A  "little bit Somewhat Quite a bit Very much   My voice has its usual quality and strength []  []  []  [x]  []    I can enjoy the taste of food []  []  [x]  []  []    I can swallow naturally and easily []  [x]  []  []  []    I have pain in my mouth, throat, or neck [x]  []  []  []  []    My mouth is dry []  []  []  [x]  []    I am bothered by skin problems [x]  []  []  []  []    I am worried about my weight []  [x]  []  []  []    My fatigue keeps me from doing the things I want to do [x]  []  []  []  []    \"Below is a list of statements that other people with your illness said are important.  Please [place] or nino one number per line to indicate your response as it applies to the past 7 days\"  [facility] Otolaryngol Head Neck Surg. doi:[email or web address]     9/13/2024     Scheduled follow-up visit after restaging PET CT scan.  Overall doing well.  Primary complaint is dry mouth.  He also has some mild lymphedema in his neck.  Very active playing golf regularly.  PET CT scan shows no evidence of residual disease or new disease.  Functional Assessment of Cancer Therapy?Head and Neck Radiotherapy measure (FACT-HN_RAD) 0-4  Statement Not at all A little bit Somewhat Quite a bit Very much   My voice has its usual quality and strength []  []  []  [x]  []    I can enjoy the taste of food []  []  []  [x]  []    I can swallow naturally and easily []  []  [x]  []  []    I have pain in my mouth, throat, or neck [x]  []  []  []  []    My mouth is dry []  []  []  []  [x]    I am bothered by skin problems [x]  []  []  []  []    I am worried about my weight []  []  []  [x]  []    My fatigue keeps me from doing the things I want to do [x]  []  []  []  []    \"Below is a list of statements that other people with your illness said are important.  Please [place] or nino one number per line to indicate your response as it applies to the past 7 days\"  [facility] Otolaryngol Head Neck Surg. doi:[email or web address]    11/22/2024     Primary " "complaint dry mouth.  Taste improved.  Getting regular dental care.  Functional Assessment of Cancer Therapy?Head and Neck Radiotherapy measure (FACT-HN_RAD) 0-4  Statement Not at all A little bit Somewhat Quite a bit Very much   My voice has its usual quality and strength []  []  []  []  [x]    I can enjoy the taste of food []  []  []  [x]  []    I can swallow naturally and easily []  []  [x]  []  []    I have pain in my mouth, throat, or neck [x]  []  []  []  []    My mouth is dry []  []  [x]  []  []    I am bothered by skin problems [x]  []  []  []  []    I am worried about my weight [x]  []  []  []  []    My fatigue keeps me from doing the things I want to do [x]  []  []  []  []    \"Below is a list of statements that other people with your illness said are important.  Please [place] or nino one number per line to indicate your response as it applies to the past 7 days\"  [facility] Otolaryngol Head Neck Surg. doi:[email or web address]        INTERVAL HISTORY:  1/21/2025     Overall doing well with improving xerostomia and taste.  Getting regular dental care.  No complaints.  Functional Assessment of Cancer Therapy?Head and Neck Radiotherapy measure (FACT-HN_RAD) 0-4  Statement Not at all A little bit Somewhat Quite a bit Very much   My voice has its usual quality and strength [] [] [] [] [x]   I can enjoy the taste of food [] [] [] [x] []   I can swallow naturally and easily [] [] [x] [] []   I have pain in my mouth, throat, or neck [x] [] [] [] []   My mouth is dry [] [x] [] [] []   I am bothered by skin problems [x] [] [] [] []   I am worried about my weight [x] [] [] [] []   My fatigue keeps me from doing the things I want to do [x] [] [] [] []   \"Below is a list of statements that other people with your illness said are important.  Please [place] or nino one number per line to indicate your response as it applies to the past 7 days\"  [facility] Otolaryngol Head Neck Surg. doi:[email or web address]    CURRENT " "MEDICATIONS:  Current Outpatient Medications   Medication Sig Dispense Refill    rosuvastatin (CRESTOR) 40 MG tablet Take 1 Tablet by mouth at bedtime. 100 Tablet 3    metoprolol SR (TOPROL XL) 25 MG TABLET SR 24 HR Take 1 Tablet by mouth every day. 100 Tablet 3    ezetimibe (ZETIA) 10 MG Tab Take 1 Tablet by mouth at bedtime. 100 Tablet 3    aspirin EC (ECOTRIN) 81 MG TBEC Take 81 mg by mouth every day.       No current facility-administered medications for this encounter.       ALLERGIES:  Patient has no known allergies.    REVIEW OF SYSTEMS:    A complete review of system taken. Pertinent items in HPI.  All other negative.    PHYSICAL EXAM:   PERFORMANCE STATUS: 100       No data to display                  1/9/2025     2:07 PM 11/22/2024     9:55 AM 9/13/2024     9:00 AM 7/11/2024     8:51 AM 7/11/2024     7:57 AM 5/21/2024     8:37 AM 5/21/2024     7:57 AM   Vitals   SYSTOLIC 104 113 123 114 130 104 131   DIASTOLIC 64 75 78 64 79 64 75   Pulse 67 71 70 71 72 75 77   Temperature  36.4 °C (97.6 °F) 37.1 °C (98.7 °F) 37.4 °C (99.3 °F) 36.9 °C (98.5 °F) 36.8 °C (98.3 °F) 36.4 °C (97.6 °F)   Respiration 16      16   Weight 173 171.74 166.01 167.99 165 166.8 164.3   Height    1.676 m (5' 5.98\")  1.676 m (5' 6\")    BMI 27.94 kg/m2 27.73 kg/m2 26.81 kg/m2 27.13 kg/m2 26.63 kg/m2 26.92 kg/m2 25.79 kg/m2   Pulse Oximetry 95 % 97 % 94 % 97 % 94 % 93 % 95 %       Physical Exam  Vitals and nursing note reviewed.   Constitutional:       General: He is not in acute distress.     Appearance: He is well-developed. He is not diaphoretic.   HENT:      Head: Normocephalic.      Mouth/Throat:      Mouth: Mucous membranes are moist.      Pharynx: Oropharynx is clear. No oropharyngeal exudate or posterior oropharyngeal erythema.   Eyes:      Extraocular Movements: Extraocular movements intact.   Cardiovascular:      Rate and Rhythm: Normal rate and regular rhythm.   Pulmonary:      Effort: Pulmonary effort is normal. "   Musculoskeletal:      Cervical back: Normal range of motion and neck supple.   Lymphadenopathy:      Cervical: No cervical adenopathy.   Skin:     General: Skin is warm and dry.      Findings: No erythema.   Neurological:      Mental Status: He is alert and oriented to person, place, and time.      Cranial Nerves: No cranial nerve deficit.      Coordination: Coordination normal.   Psychiatric:         Behavior: Behavior normal.         Thought Content: Thought content normal.         Judgment: Judgment normal.           FIBEROPTIC EXAM:  Normal exam    LABORATORY DATA:   Lab Results   Component Value Date/Time    WBC 6.6 11/15/2024 07:51 AM    WBC 5.3 05/13/2024 09:05 AM    RBC 4.97 11/15/2024 07:51 AM    RBC 4.05 (L) 05/13/2024 09:05 AM    HEMOGLOBIN 14.2 11/15/2024 07:51 AM    HEMOGLOBIN 11.7 (L) 05/13/2024 09:05 AM    HEMATOCRIT 43.8 11/15/2024 07:51 AM    HEMATOCRIT 33.9 (L) 05/13/2024 09:05 AM    MCV 88 11/15/2024 07:51 AM    MCV 83.7 05/13/2024 09:05 AM    MCH 28.6 11/15/2024 07:51 AM    MCH 28.9 05/13/2024 09:05 AM    MCHC 32.4 11/15/2024 07:51 AM    MCHC 34.5 05/13/2024 09:05 AM    RDW 12.9 11/15/2024 07:51 AM    RDW 40.1 05/13/2024 09:05 AM    PLATELETCT 204 11/15/2024 07:51 AM    PLATELETCT 141 (L) 05/13/2024 09:05 AM    MPV 9.8 05/13/2024 09:05 AM    NEUTSPOLYS 68 11/15/2024 07:51 AM    NEUTSPOLYS 80.60 (H) 05/13/2024 09:05 AM    LYMPHOCYTES 15 11/15/2024 07:51 AM    LYMPHOCYTES 7.50 (L) 05/13/2024 09:05 AM    MONOCYTES 10 11/15/2024 07:51 AM    MONOCYTES 10.20 05/13/2024 09:05 AM    EOSINOPHILS 5 11/15/2024 07:51 AM    EOSINOPHILS 1.10 05/13/2024 09:05 AM    BASOPHILS 1 11/15/2024 07:51 AM    BASOPHILS 0.20 05/13/2024 09:05 AM      Lab Results   Component Value Date/Time    SODIUM 143 11/15/2024 07:51 AM    SODIUM 137 05/13/2024 09:05 AM    POTASSIUM 4.9 11/15/2024 07:51 AM    POTASSIUM 4.7 05/13/2024 09:05 AM    CHLORIDE 104 11/15/2024 07:51 AM    CHLORIDE 103 05/13/2024 09:05 AM    CO2 27 11/15/2024  07:51 AM    CO2 22 05/13/2024 09:05 AM    GLUCOSE 97 11/15/2024 07:51 AM    GLUCOSE 100 (H) 05/13/2024 09:05 AM    BUN 26 11/15/2024 07:51 AM    BUN 29 (H) 05/13/2024 09:05 AM    CREATININE 1.06 11/15/2024 07:51 AM    CREATININE 0.77 05/13/2024 09:05 AM    CREATININE 1.0 08/07/2006 10:20 AM    BUNCREATRAT 25 (H) 11/15/2024 07:51 AM    BUNCREATRAT 16 01/21/2015 08:52 AM         RADIOLOGY DATA:  No results found.    IMPRESSION:    A 69 y.o. 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 on 3/28/2024  Histopathologic type: Squamous cell carcinoma, NOS  Stage prefix: Initial diagnosis  Residual tumor (R): R0 - None  ECOG performance status: Grade 0  Karnofsky performance status: Score 100  Prognostic indicators: BERTRAM <2mm      CANCER STATUS:  No Evidence of Disease    RECOMMENDATIONS:     Reassured him.  He will see Dr. Maldonado in April and return here for follow-up in July 2025      Thank you for the opportunity to participate in his care.  If any questions or comments, please do not hesitate in calling.    No orders of the defined types were placed in this encounter.

## 2025-01-21 NOTE — PROCEDURES
DATE OF SERVICE: 1/21/2025         FIBEROPTIC NASO-PHARYNGOSCOPY NOTE      Flexible fiberoptic exam was performed after anesthesia of left nostril and oropharynx.    Nasopharynx:       R. Eustachian canal opening, torus, fossa of Rosenmuller appear normal  L. Eustachian canal opening, torus, fossa of Rosenmuller appear normal      Oropharynx:        Base of tongue normal appearing.  Vallecula normal appearing.  Epiglottis normal appearing.  PE folds normal appearing.    Larynx:      R. AE fold, false vocal fold, arytenoid appear normal  L. AE fold, false vocal fold, arytenoid appear normal  R. Cord mobile  L. Cord mobile   R. Piriform sinus appears normal  L. Piriform sinus appears normal      Michael JOHNSON M.D.  Electronically signed by: Michael JOHNSON M.D., 1/21/2025 8:47 AM  [phone]

## 2025-08-02 ASSESSMENT — LIFESTYLE VARIABLES
SMOKING_STATUS: NO
TOBACCO_USE: NO

## 2025-08-05 ENCOUNTER — HOSPITAL ENCOUNTER (OUTPATIENT)
Dept: RADIATION ONCOLOGY | Facility: MEDICAL CENTER | Age: 70
End: 2025-08-05
Attending: RADIOLOGY
Payer: MEDICARE

## 2025-08-05 VITALS
WEIGHT: 171.08 LBS | DIASTOLIC BLOOD PRESSURE: 79 MMHG | HEART RATE: 66 BPM | OXYGEN SATURATION: 95 % | TEMPERATURE: 98.8 F | BODY MASS INDEX: 27.63 KG/M2 | SYSTOLIC BLOOD PRESSURE: 105 MMHG

## 2025-08-05 PROCEDURE — 99213 OFFICE O/P EST LOW 20 MIN: CPT | Mod: 25 | Performed by: RADIOLOGY

## 2025-08-05 PROCEDURE — 31575 DIAGNOSTIC LARYNGOSCOPY: CPT | Performed by: RADIOLOGY

## 2025-08-05 ASSESSMENT — PAIN SCALES - GENERAL: PAINLEVEL_OUTOF10: NO PAIN

## 2025-08-05 ASSESSMENT — FIBROSIS 4 INDEX: FIB4 SCORE: 1.92
